# Patient Record
Sex: FEMALE | Race: WHITE | Employment: OTHER
[De-identification: names, ages, dates, MRNs, and addresses within clinical notes are randomized per-mention and may not be internally consistent; named-entity substitution may affect disease eponyms.]

---

## 2017-01-01 ENCOUNTER — HEALTH MAINTENANCE LETTER (OUTPATIENT)
Age: 57
End: 2017-01-01

## 2017-03-17 ENCOUNTER — TRANSFERRED RECORDS (OUTPATIENT)
Dept: HEALTH INFORMATION MANAGEMENT | Facility: CLINIC | Age: 57
End: 2017-03-17

## 2017-03-21 ENCOUNTER — TRANSFERRED RECORDS (OUTPATIENT)
Dept: HEALTH INFORMATION MANAGEMENT | Facility: CLINIC | Age: 57
End: 2017-03-21

## 2017-03-23 ENCOUNTER — TRANSFERRED RECORDS (OUTPATIENT)
Dept: HEALTH INFORMATION MANAGEMENT | Facility: CLINIC | Age: 57
End: 2017-03-23

## 2017-03-29 ENCOUNTER — TRANSFERRED RECORDS (OUTPATIENT)
Dept: HEALTH INFORMATION MANAGEMENT | Facility: CLINIC | Age: 57
End: 2017-03-29

## 2017-03-30 ENCOUNTER — TRANSFERRED RECORDS (OUTPATIENT)
Dept: HEALTH INFORMATION MANAGEMENT | Facility: CLINIC | Age: 57
End: 2017-03-30

## 2017-10-21 ENCOUNTER — HOSPITAL ENCOUNTER (EMERGENCY)
Facility: CLINIC | Age: 57
Discharge: HOME | End: 2017-10-21
Attending: EMERGENCY MEDICINE | Admitting: EMERGENCY MEDICINE
Payer: COMMERCIAL

## 2017-10-21 ENCOUNTER — APPOINTMENT (OUTPATIENT)
Dept: CT IMAGING | Facility: CLINIC | Age: 57
End: 2017-10-21
Attending: EMERGENCY MEDICINE
Payer: COMMERCIAL

## 2017-10-21 VITALS
HEIGHT: 62 IN | DIASTOLIC BLOOD PRESSURE: 89 MMHG | BODY MASS INDEX: 17.39 KG/M2 | SYSTOLIC BLOOD PRESSURE: 125 MMHG | HEART RATE: 70 BPM | RESPIRATION RATE: 20 BRPM | TEMPERATURE: 97.9 F | OXYGEN SATURATION: 95 % | WEIGHT: 94.5 LBS

## 2017-10-21 DIAGNOSIS — R04.2 HEMOPTYSIS: ICD-10-CM

## 2017-10-21 LAB
ALBUMIN SERPL-MCNC: 3.9 G/DL (ref 3.4–5)
ALP SERPL-CCNC: 96 U/L (ref 40–150)
ALT SERPL W P-5'-P-CCNC: 29 U/L (ref 0–50)
ANION GAP SERPL CALCULATED.3IONS-SCNC: 6 MMOL/L (ref 3–14)
AST SERPL W P-5'-P-CCNC: 19 U/L (ref 0–45)
BASOPHILS # BLD AUTO: 0 10E9/L (ref 0–0.2)
BASOPHILS NFR BLD AUTO: 0.1 %
BILIRUB SERPL-MCNC: 0.2 MG/DL (ref 0.2–1.3)
BUN SERPL-MCNC: 10 MG/DL (ref 7–30)
CALCIUM SERPL-MCNC: 9.4 MG/DL (ref 8.5–10.1)
CHLORIDE SERPL-SCNC: 105 MMOL/L (ref 94–109)
CO2 SERPL-SCNC: 28 MMOL/L (ref 20–32)
CREAT SERPL-MCNC: 0.71 MG/DL (ref 0.52–1.04)
DIFFERENTIAL METHOD BLD: ABNORMAL
EOSINOPHIL # BLD AUTO: 0 10E9/L (ref 0–0.7)
EOSINOPHIL NFR BLD AUTO: 0 %
ERYTHROCYTE [DISTWIDTH] IN BLOOD BY AUTOMATED COUNT: 12.1 % (ref 10–15)
GFR SERPL CREATININE-BSD FRML MDRD: 85 ML/MIN/1.7M2
GLUCOSE SERPL-MCNC: 90 MG/DL (ref 70–99)
HCT VFR BLD AUTO: 39.2 % (ref 35–47)
HGB BLD-MCNC: 13 G/DL (ref 11.7–15.7)
IMM GRANULOCYTES # BLD: 0 10E9/L (ref 0–0.4)
IMM GRANULOCYTES NFR BLD: 0.2 %
LACTATE SERPL-SCNC: 0.7 MMOL/L (ref 0.4–2)
LYMPHOCYTES # BLD AUTO: 2.5 10E9/L (ref 0.8–5.3)
LYMPHOCYTES NFR BLD AUTO: 31.3 %
MCH RBC QN AUTO: 31.6 PG (ref 26.5–33)
MCHC RBC AUTO-ENTMCNC: 33.2 G/DL (ref 31.5–36.5)
MCV RBC AUTO: 95 FL (ref 78–100)
MONOCYTES # BLD AUTO: 0.7 10E9/L (ref 0–1.3)
MONOCYTES NFR BLD AUTO: 8.6 %
NEUTROPHILS # BLD AUTO: 4.8 10E9/L (ref 1.6–8.3)
NEUTROPHILS NFR BLD AUTO: 59.8 %
NRBC # BLD AUTO: 0 10*3/UL
NRBC BLD AUTO-RTO: 0 /100
PLATELET # BLD AUTO: 455 10E9/L (ref 150–450)
POTASSIUM SERPL-SCNC: 3.9 MMOL/L (ref 3.4–5.3)
PROT SERPL-MCNC: 7.3 G/DL (ref 6.8–8.8)
RBC # BLD AUTO: 4.11 10E12/L (ref 3.8–5.2)
SODIUM SERPL-SCNC: 139 MMOL/L (ref 133–144)
TROPONIN I SERPL-MCNC: <0.015 UG/L (ref 0–0.04)
WBC # BLD AUTO: 8 10E9/L (ref 4–11)

## 2017-10-21 PROCEDURE — 96361 HYDRATE IV INFUSION ADD-ON: CPT

## 2017-10-21 PROCEDURE — 99285 EMERGENCY DEPT VISIT HI MDM: CPT | Mod: 25

## 2017-10-21 PROCEDURE — 71260 CT THORAX DX C+: CPT

## 2017-10-21 PROCEDURE — 83605 ASSAY OF LACTIC ACID: CPT | Performed by: EMERGENCY MEDICINE

## 2017-10-21 PROCEDURE — 80053 COMPREHEN METABOLIC PANEL: CPT | Performed by: EMERGENCY MEDICINE

## 2017-10-21 PROCEDURE — 85025 COMPLETE CBC W/AUTO DIFF WBC: CPT | Performed by: EMERGENCY MEDICINE

## 2017-10-21 PROCEDURE — 96360 HYDRATION IV INFUSION INIT: CPT | Mod: 59

## 2017-10-21 PROCEDURE — 84484 ASSAY OF TROPONIN QUANT: CPT | Performed by: EMERGENCY MEDICINE

## 2017-10-21 PROCEDURE — 25000128 H RX IP 250 OP 636: Performed by: EMERGENCY MEDICINE

## 2017-10-21 RX ORDER — IOPAMIDOL 755 MG/ML
500 INJECTION, SOLUTION INTRAVASCULAR ONCE
Status: COMPLETED | OUTPATIENT
Start: 2017-10-21 | End: 2017-10-21

## 2017-10-21 RX ADMIN — SODIUM CHLORIDE 66 ML: 9 INJECTION, SOLUTION INTRAVENOUS at 21:37

## 2017-10-21 RX ADMIN — IOPAMIDOL 47 ML: 755 INJECTION, SOLUTION INTRAVENOUS at 21:37

## 2017-10-21 RX ADMIN — SODIUM CHLORIDE 1000 ML: 9 INJECTION, SOLUTION INTRAVENOUS at 18:30

## 2017-10-21 ASSESSMENT — ENCOUNTER SYMPTOMS
FEVER: 0
HEMATURIA: 0
DYSURIA: 0
NAUSEA: 0
VOMITING: 0
SHORTNESS OF BREATH: 0
COUGH: 1
FREQUENCY: 0
ABDOMINAL PAIN: 0

## 2017-10-21 NOTE — ED NOTES
Patient complaining of coughing up blood today.  Is currently treating kidney cancer with mets to the lungs.  Oncology sent for eval.      ABCs intact.  Alert and oriented x 3.

## 2017-10-21 NOTE — ED AVS SNAPSHOT
[facility] Emergency Department    [address]    [facility] [address]    Phone:  [phone]    Fax:  [phone]                                       Meggan Law   MRN: [id number]    Department:  [facility] Emergency Department   Date of Visit:  10/21/2017           After Visit Summary Signature Page     I have received my discharge instructions, and my questions have been answered. I have discussed any challenges I see with this plan with the nurse or doctor.    ..........................................................................................................................................  Patient/Patient Representative Signature      ..........................................................................................................................................  Patient Representative Print Name and Relationship to Patient    ..................................................               ................................................  Date                                            Time    ..........................................................................................................................................  Reviewed by Signature/Title    ...................................................              ..............................................  Date                                                            Time

## 2017-10-21 NOTE — ED PROVIDER NOTES
History     Chief Complaint:  Bloody Cough     HPI   Meggan Law is a 57 year old female with a history of kidney cancer with metastasis to the lungs currently on Nivolumab chemotherapy who presents to the emergency department accompanied by her  for evaluation of a bloody cough. Recently, the patient has had a somewhat productive cough. This afternoon, the patient started to develop a cough with blood present in the sputum. She did take Robitussin with Codeine with some improvement of her cough. She then contacted her oncologist regarding this who referred her to seek evaluation in the ED. Otherwise, she has not had any fever, chest pain, shortness of breath, abdominal pain, nausea, vomiting, dysuria, hematuria, or urinary frequency in association with her current symptoms. Her oncologist is Dr. Quiroga of [facility].     Allergies:  Influenza vaccine     Medications:    Calcium carb-Cholecalciferol   Multivitamin   Vimpat  Lamotrigine  Nivolumab   Norvasc   Keppra     Past Medical History:    Primary malignant neoplasm of left kidney with metastasis from kidney to other site   Intractable partial epilepsy  Ganglion of joint  Mucous cyst of joint  Nontoxic multinodular goiter  Polyp of colon, adenomatous  Diverticulosis of large intestine  Internal hemorrhoids   Osteoporosis   Lumbago  Osteopenia     Past Surgical History:    Appendectomy  Tonsillectomy  Neotsu teeth extraction    Family History:    CAD - Father  CHF - Father   COPD - Mother and father   Osteoporosis - Mother  Schizophrenia - Sister     Social History:  Tobacco use:    Current some day smoker - 0.50 packs / day for 35 years   Alcohol use:    Positive - 1 drink / month  Marital status:       Accompanied to ED by:       Review of Systems   Constitutional: Negative for fever.   Respiratory: Positive for cough (hemoptysis ). Negative for shortness of breath.    Cardiovascular: Negative for chest pain.  "  Gastrointestinal: Negative for abdominal pain, nausea and vomiting.   Genitourinary: Negative for dysuria, frequency and hematuria.   All other systems reviewed and are negative.    Physical Exam   First Vitals:  BP: 134/81  Pulse: 82  Heart Rate: 70  Temp: 97.9  F (36.6  C)  Resp: 20  Height: 157.5 cm (5' 2\")  Weight: 42.9 kg (94 lb 8 oz)  SpO2: 96 %      Physical Exam  Nursing note and vitals reviewed.  Constitutional: Cooperative. No distress.  HENT:   Mouth/Throat: Moist mucous membranes.   Eyes: EOMI, nonicteric sclera  Cardiovascular: Normal rate, regular rhythm, no murmurs, rubs, or gallops  Pulmonary/Chest: Effort normal and breath sounds normal. No respiratory distress. No wheezes. No rales.   Abdominal: Soft. Nontender, nondistended, no guarding or rigidity. BS present.   Musculoskeletal: Normal range of motion.   Neurological: Alert. Moves all extremities spontaneously.   Skin: Skin is warm and dry. No rash noted.   Psychiatric: Normal mood and affect.       Emergency Department Course     Imaging:  Radiographic findings were communicated with the patient and family who voiced understanding of the findings.    Chest CT, IV Contrast Only - PE Protocol:  IMPRESSION:  1. No evidence of pulmonary embolus.  2. Progression of extensive bilateral pulmonary metastatic lesions.  Per radiology.     Laboratory:  CBC:  high, o/w WNL (WBC 8.0, HGB 13.0)  CMP: WNL (Creatinine 0.71)  Troponin I 2000: <0.015  Lactic acid: 0.7       Interventions:  1830 NS 1,000 mL IV     Emergency Department Course:  Nursing notes and vitals reviewed.  1917: I performed an exam of the patient as documented above.     2228: I spoke with Dr. Quiroga of the oncology service from [facility] regarding patient's presentation, findings, and plan of care.    2230: I updated and reassessed the patient.     I personally reviewed the laboratory results with the Patient and spouse and answered all related questions prior to " discharge.     Findings and plan explained to the Patient and spouse. Patient discharged home with instructions regarding supportive care, medications, and reasons to return. The importance of close follow-up was reviewed.     Impression & Plan      Medical Decision Making:  Meggan Law is a 57 year old female who presents with hemoptysis. The patient has a history of metastatic renal cell carcinoma to her lungs. The history is broad and includes mucosal sloughing secondary to frequent coughing, PE, metastatic infiltration into the bronchioles, pneumonia, among many other processes. CT chest is negative for acute etiology but does show worsening metastasis. This was compared with CT from March however. The patient notes that she did have a much more recent CT which did show similar progression. She states that is why she was on the new medication from her oncologist. I did actually speak with her oncologist, Dr. Quiroga, and discussed the patient's lab and imaging results. He states that often times after stating immunotherapy that there is an increase in inflammation and he is suspicious that her increase in inflammation is responsible for her hemoptysis. This is certainly not massive hemoptysis at this time as there is only about a dime-sized spot of blood on the Kleenexes that the patient brought with her. I did discuss the reasons to return to the emergency department including fever, chest pain, shortness of breath, massive hemoptysis, or for any other concerns. She is in stable condition at the time of discharge, indications for return to the ED were discussed as well as follow up. All questions were answered and she and  are in agreement with the plan.      Diagnosis:    ICD-10-CM   1. Hemoptysis R04.2       Disposition:  Discharged to home.       I, Parker Randall, am serving as a scribe at 7:17 PM on 10/21/2017 to document services personally performed by Dr. Devine, based on my  observations and the provider's statements to me.      [facility] EMERGENCY DEPARTMENT       Hal Devine MD  10/23/17 7729

## 2017-10-21 NOTE — ED AVS SNAPSHOT
[facility] Emergency Department    [address]    [facility] [address]    Phone:  [phone]    Fax:  [phone]                                       Meggan Law   MRN: [id number]    Department:  [facility] Emergency Department   Date of Visit:  10/21/2017           Patient Information     Date Of Birth          1960        Your diagnoses for this visit were:     Hemoptysis        You were seen by Hal Devine MD.      Follow-up Information     Call [facility].    Why:  touch base on Monday     Contact information:    [id number]  [phone]  [place] [phone]        Follow up with [facility] Emergency Department.    Specialty:  EMERGENCY MEDICINE    Why:  As needed, If symptoms worsen    Contact information:    [address]  [facility] [address]  [phone]        Discharge Instructions         Hemoptysis    Hemoptysis is the medical term for coughing up blood. There are many causes for this, including minor illnesses like bronchitis. Hemoptysis can also be an early sign of a more serious illness, like a blood clot in the lung (pulmonary embolism), cancer, tuberculosis, or pneumonia.  Less common causes of hemoptysis can be hard to diagnose in an emergency department or a clinic. More testing will be needed if the symptoms continue.  Home care    Stay away from cigarette smoke. Smoke irritates the bronchial passages.    Unless you are taking daily aspirin to prevent stroke or heart attack, do not take aspirin or products that contain aspirin. Aspirin affects how readily the blood clots. Medicines that prevent clotting may make hemoptysis worse.    If you have a lung infection, drinking extra fluid will help loosen secretions in the lungs.    Over-the-counter cough medicines that contain dextromethorphan may help reduce coughing. Check with a medical provider before taking  dextromethorphan if you have a chronic illness, are pregnant, or take daily medications.    If you were prescribed an antibiotic, take it until it is all gone. Take it even if you are feeling better after only a few days.  Follow-up care  Follow up with your health care provider, or as advised.  When to seek medical advice  Call your healthcare provider right away if any of these occur:    Fever of 100.4 F (38 C) or higher  Call 911, or get immediate medical care  Call emergency services right away if any of these occur:    Coughing up an increased amount of blood    Trouble breathing, wheezing, or pain with breathing    Chest pain or chest pressure    Fainting or losing consciousness    Rapid heartbeat    Weakness or dizziness  Date Last Reviewed: 9/13/2015 2000-2017 [employer]. [address] [place] [address]. All rights reserved. This information is not intended as a substitute for professional medical care. Always follow your healthcare professional's instructions.          24 Hour Appointment Hotline       To make an appointment at any [facility], call [phone] ([phone]). If you don't have a family doctor or clinic, we will help you find one. [facility] clinics are conveniently located to serve the needs of you and your family.             Review of your medicines      Notice     You have not been prescribed any medications.            Procedures and tests performed during your visit     CBC with platelets differential    Chest CT, IV contrast only - PE protocol    Comprehensive metabolic panel    Lactic acid    Peripheral IV catheter    Troponin I      Orders Needing Specimen Collection     None      Pending Results     No orders found from 10/19/2017 to 10/22/2017.            Pending Culture Results     No orders found from 10/19/2017 to 10/22/2017.            Pending Results Instructions     If you had any lab results that were not finalized at the time of your  Discharge, you can call the ED Lab Result RN at [phone]. You will be contacted by this team for any positive Lab results or changes in treatment. The nurses are available 7 days a week from 10A to 6:30P.  You can leave a message 24 hours per day and they will return your call.        Test Results From Your Hospital Stay        10/21/2017  8:30 PM      Component Results     Component Value Ref Range & Units Status    WBC 8.0 4.0 - 11.0 10e9/L Final    RBC Count 4.11 3.8 - 5.2 10e12/L Final    Hemoglobin 13.0 11.7 - 15.7 g/dL Final    Hematocrit 39.2 35.0 - 47.0 % Final    MCV 95 78 - 100 fl Final    MCH 31.6 26.5 - 33.0 pg Final    MCHC 33.2 31.5 - 36.5 g/dL Final    RDW 12.1 10.0 - 15.0 % Final    Platelet Count 455 (H) 150 - 450 10e9/L Final    Diff Method Automated Method  Final    % Neutrophils 59.8 % Final    % Lymphocytes 31.3 % Final    % Monocytes 8.6 % Final    % Eosinophils 0.0 % Final    % Basophils 0.1 % Final    % Immature Granulocytes 0.2 % Final    Nucleated RBCs 0 0 /100 Final    Absolute Neutrophil 4.8 1.6 - 8.3 10e9/L Final    Absolute Lymphocytes 2.5 0.8 - 5.3 10e9/L Final    Absolute Monocytes 0.7 0.0 - 1.3 10e9/L Final    Absolute Eosinophils 0.0 0.0 - 0.7 10e9/L Final    Absolute Basophils 0.0 0.0 - 0.2 10e9/L Final    Abs Immature Granulocytes 0.0 0 - 0.4 10e9/L Final    Absolute Nucleated RBC 0.0  Final         10/21/2017  8:52 PM      Component Results     Component Value Ref Range & Units Status    Sodium 139 133 - 144 mmol/L Final    Potassium 3.9 3.4 - 5.3 mmol/L Final    Chloride 105 94 - 109 mmol/L Final    Carbon Dioxide 28 20 - 32 mmol/L Final    Anion Gap 6 3 - 14 mmol/L Final    Glucose 90 70 - 99 mg/dL Final    Urea Nitrogen 10 7 - 30 mg/dL Final    Creatinine 0.71 0.52 - 1.04 mg/dL Final    GFR Estimate 85 >60 mL/min/1.7m2 Final    Non  GFR Calc    GFR Estimate If Black >90 >60 mL/min/1.7m2 Final    African American GFR Calc    Calcium 9.4 8.5 - 10.1 mg/dL  Final    Bilirubin Total 0.2 0.2 - 1.3 mg/dL Final    Albumin 3.9 3.4 - 5.0 g/dL Final    Protein Total 7.3 6.8 - 8.8 g/dL Final    Alkaline Phosphatase 96 40 - 150 U/L Final    ALT 29 0 - 50 U/L Final    AST 19 0 - 45 U/L Final         10/21/2017  8:52 PM      Component Results     Component Value Ref Range & Units Status    Troponin I ES <0.015 0.000 - 0.045 ug/L Final    The 99th percentile for upper reference range is 0.045 ug/L.  Troponin values   in the range of 0.045 - 0.120 ug/L may be associated with risks of adverse   clinical events.           10/21/2017 10:00 PM      Narrative     CT CHEST PULMONARY EMBOLISM WITH CONTRAST 10/21/2017 9:39 PM     HISTORY: Hemoptysis, metastatic RCC.    TECHNIQUE: 47 mL Isovue-370 IV.  Radiation dose for this scan was  reduced using automated exposure control, adjustment of the mA and/or  kV according to patient size, or iterative reconstruction technique.    COMPARISON: 3/17/2017 chest CT.    FINDINGS: No evidence of pulmonary embolus. No evidence of aortic  aneurysm or dissection. Extensive bilateral pulmonary metastatic  lesions appear more prominent than on the prior study. No significant  adenopathy. Scans that include a portion of the upper abdomen are  unremarkable.        Impression     IMPRESSION:  1. No evidence of pulmonary embolus.  2. Progression of extensive bilateral pulmonary metastatic lesions.    BRIE DAVIS MD         10/21/2017  8:41 PM      Component Results     Component Value Ref Range & Units Status    Lactic Acid 0.7 0.4 - 2.0 mmol/L Final                Clinical Quality Measure: Blood Pressure Screening     Your blood pressure was checked while you were in the emergency department today. The last reading we obtained was  BP: 143/77 . Please read the guidelines below about what these numbers mean and what you should do about them.  If your systolic blood pressure (the top number) is less than 120 and your diastolic blood pressure (the bottom  "number) is less than 80, then your blood pressure is normal. There is nothing more that you need to do about it.  If your systolic blood pressure (the top number) is 120-139 or your diastolic blood pressure (the bottom number) is 80-89, your blood pressure may be higher than it should be. You should have your blood pressure rechecked within a year by a primary care provider.  If your systolic blood pressure (the top number) is 140 or greater or your diastolic blood pressure (the bottom number) is 90 or greater, you may have high blood pressure. High blood pressure is treatable, but if left untreated over time it can put you at risk for heart attack, stroke, or kidney failure. You should have your blood pressure rechecked by a primary care provider within the next 4 weeks.  If your provider in the emergency department today gave you specific instructions to follow-up with your doctor or provider even sooner than that, you should follow that instruction and not wait for up to 4 weeks for your follow-up visit.        Thank you for choosing [facility]       Thank you for choosing [facility] for your care. Our goal is always to provide you with excellent care. Hearing back from our patients is one way we can continue to improve our services. Please take a few minutes to complete the written survey that you may receive in the mail after you visit with us. Thank you!        [employer] Information     [employer] lets you send messages to your doctor, view your test results, renew your prescriptions, schedule appointments and more. To sign up, go to [email or web address] . Click on \"Log in\" on the left side of the screen, which will take you to the Welcome page. Then click on \"Sign up Now\" on the right side of the page.     You will be asked to enter the access code listed below, as well as some personal information. Please follow the directions to create your username and password.     Your access code is: [email or web address]  Expires: " 2018 11:02 PM     Your access code will  in 90 days. If you need help or a new code, please call your [facility] clinic or [phone].        Care EveryWhere ID     This is your Care EveryWhere ID. This could be used by other organizations to access your [facility] medical records  [id number]        Equal Access to Services     PROSPER STEINER : Abel Murrieta, veronica shetty, myriam galarza, areli villatoro . So [facility] [phone].    ATENCIÓN: Si habla español, tiene a hinson disposición servicios gratuitos de asistencia lingüística. Llame al [phone].    We comply with applicable federal civil rights laws and [place] laws. We do not discriminate on the basis of race, color, national origin, age, disability, sex, sexual orientation, or gender identity.            After Visit Summary       This is your record. Keep this with you and show to your community pharmacist(s) and doctor(s) at your next visit.

## 2017-10-22 NOTE — DISCHARGE INSTRUCTIONS
Hemoptysis    Hemoptysis is the medical term for coughing up blood. There are many causes for this, including minor illnesses like bronchitis. Hemoptysis can also be an early sign of a more serious illness, like a blood clot in the lung (pulmonary embolism), cancer, tuberculosis, or pneumonia.  Less common causes of hemoptysis can be hard to diagnose in an emergency department or a clinic. More testing will be needed if the symptoms continue.  Home care    Stay away from cigarette smoke. Smoke irritates the bronchial passages.    Unless you are taking daily aspirin to prevent stroke or heart attack, do not take aspirin or products that contain aspirin. Aspirin affects how readily the blood clots. Medicines that prevent clotting may make hemoptysis worse.    If you have a lung infection, drinking extra fluid will help loosen secretions in the lungs.    Over-the-counter cough medicines that contain dextromethorphan may help reduce coughing. Check with a medical provider before taking dextromethorphan if you have a chronic illness, are pregnant, or take daily medications.    If you were prescribed an antibiotic, take it until it is all gone. Take it even if you are feeling better after only a few days.  Follow-up care  Follow up with your health care provider, or as advised.  When to seek medical advice  Call your healthcare provider right away if any of these occur:    Fever of 100.4 F (38 C) or higher  Call 911, or get immediate medical care  Call emergency services right away if any of these occur:    Coughing up an increased amount of blood    Trouble breathing, wheezing, or pain with breathing    Chest pain or chest pressure    Fainting or losing consciousness    Rapid heartbeat    Weakness or dizziness  Date Last Reviewed: 9/13/2015 2000-2017 [employer]. [address] [place] [address], [place] [address]. All rights reserved. This information is not intended as a substitute for professional medical  care. Always follow your healthcare professional's instructions.

## 2018-01-01 ENCOUNTER — HOSPITAL ENCOUNTER (OUTPATIENT)
Facility: CLINIC | Age: 58
End: 2018-01-01
Payer: COMMERCIAL

## 2018-01-01 ENCOUNTER — HOSPITAL ENCOUNTER (OUTPATIENT)
Dept: ULTRASOUND IMAGING | Facility: CLINIC | Age: 58
Discharge: HOME | End: 2018-09-17
Admitting: PHYSICIAN ASSISTANT
Payer: COMMERCIAL

## 2018-01-01 ENCOUNTER — APPOINTMENT (OUTPATIENT)
Dept: GENERAL RADIOLOGY | Facility: CLINIC | Age: 58
DRG: 180 | End: 2018-01-01
Attending: INTERNAL MEDICINE
Payer: COMMERCIAL

## 2018-01-01 ENCOUNTER — HOSPITAL ENCOUNTER (OUTPATIENT)
Dept: ULTRASOUND IMAGING | Facility: CLINIC | Age: 58
Discharge: HOME | End: 2018-10-22
Admitting: INTERNAL MEDICINE
Payer: COMMERCIAL

## 2018-01-01 ENCOUNTER — APPOINTMENT (OUTPATIENT)
Dept: ULTRASOUND IMAGING | Facility: CLINIC | Age: 58
DRG: 180 | End: 2018-01-01
Attending: EMERGENCY MEDICINE
Payer: COMMERCIAL

## 2018-01-01 ENCOUNTER — HOSPITAL ENCOUNTER (OUTPATIENT)
Dept: ULTRASOUND IMAGING | Facility: CLINIC | Age: 58
Discharge: HOME | End: 2018-08-27
Admitting: RADIOLOGY
Payer: COMMERCIAL

## 2018-01-01 ENCOUNTER — HOSPITAL ENCOUNTER (OUTPATIENT)
Dept: GENERAL RADIOLOGY | Facility: CLINIC | Age: 58
End: 2018-09-28
Attending: RADIOLOGY
Payer: COMMERCIAL

## 2018-01-01 ENCOUNTER — APPOINTMENT (OUTPATIENT)
Dept: CT IMAGING | Facility: CLINIC | Age: 58
End: 2018-01-01
Attending: EMERGENCY MEDICINE
Payer: COMMERCIAL

## 2018-01-01 ENCOUNTER — HOSPITAL ENCOUNTER (OUTPATIENT)
Dept: ULTRASOUND IMAGING | Facility: CLINIC | Age: 58
Discharge: HOME | End: 2018-09-13
Admitting: INTERNAL MEDICINE
Payer: COMMERCIAL

## 2018-01-01 ENCOUNTER — HOSPITAL ENCOUNTER (OUTPATIENT)
Dept: ULTRASOUND IMAGING | Facility: CLINIC | Age: 58
Discharge: HOME | End: 2018-10-05
Admitting: INTERNAL MEDICINE
Payer: COMMERCIAL

## 2018-01-01 ENCOUNTER — HOSPITAL ENCOUNTER (OUTPATIENT)
Facility: CLINIC | Age: 58
Discharge: HOME | End: 2018-08-17
Admitting: RADIOLOGY
Payer: COMMERCIAL

## 2018-01-01 ENCOUNTER — HOSPITAL ENCOUNTER (OUTPATIENT)
Dept: ULTRASOUND IMAGING | Facility: CLINIC | Age: 58
End: 2018-08-17
Admitting: COLON & RECTAL SURGERY
Payer: COMMERCIAL

## 2018-01-01 ENCOUNTER — APPOINTMENT (OUTPATIENT)
Dept: GENERAL RADIOLOGY | Facility: CLINIC | Age: 58
End: 2018-01-01
Attending: EMERGENCY MEDICINE
Payer: COMMERCIAL

## 2018-01-01 ENCOUNTER — APPOINTMENT (OUTPATIENT)
Dept: ULTRASOUND IMAGING | Facility: CLINIC | Age: 58
End: 2018-01-01
Attending: EMERGENCY MEDICINE
Payer: COMMERCIAL

## 2018-01-01 ENCOUNTER — HOSPITAL ENCOUNTER (OUTPATIENT)
Dept: ULTRASOUND IMAGING | Facility: CLINIC | Age: 58
Discharge: HOME | End: 2018-10-15
Admitting: PHYSICIAN ASSISTANT
Payer: COMMERCIAL

## 2018-01-01 ENCOUNTER — HOSPITAL ENCOUNTER (OUTPATIENT)
Dept: ULTRASOUND IMAGING | Facility: CLINIC | Age: 58
Discharge: HOME | End: 2018-09-21
Admitting: INTERNAL MEDICINE
Payer: COMMERCIAL

## 2018-01-01 ENCOUNTER — TRANSFERRED RECORDS (OUTPATIENT)
Dept: HEALTH INFORMATION MANAGEMENT | Facility: CLINIC | Age: 58
End: 2018-01-01

## 2018-01-01 ENCOUNTER — HOSPITAL ENCOUNTER (OUTPATIENT)
Dept: ULTRASOUND IMAGING | Facility: CLINIC | Age: 58
Discharge: HOME | End: 2018-09-28
Admitting: INTERNAL MEDICINE
Payer: COMMERCIAL

## 2018-01-01 ENCOUNTER — APPOINTMENT (OUTPATIENT)
Dept: GENERAL RADIOLOGY | Facility: CLINIC | Age: 58
DRG: 180 | End: 2018-01-01
Attending: RADIOLOGY
Payer: COMMERCIAL

## 2018-01-01 ENCOUNTER — APPOINTMENT (OUTPATIENT)
Dept: CT IMAGING | Facility: CLINIC | Age: 58
DRG: 180 | End: 2018-01-01
Attending: EMERGENCY MEDICINE
Payer: COMMERCIAL

## 2018-01-01 ENCOUNTER — HOSPITAL ENCOUNTER (EMERGENCY)
Facility: CLINIC | Age: 58
Discharge: HOME | End: 2018-03-12
Attending: EMERGENCY MEDICINE | Admitting: EMERGENCY MEDICINE
Payer: COMMERCIAL

## 2018-01-01 ENCOUNTER — HOSPITAL ENCOUNTER (OUTPATIENT)
Dept: ULTRASOUND IMAGING | Facility: CLINIC | Age: 58
Discharge: HOME | End: 2018-09-07
Admitting: RADIOLOGY
Payer: COMMERCIAL

## 2018-01-01 ENCOUNTER — HOSPITAL ENCOUNTER (OUTPATIENT)
Dept: ULTRASOUND IMAGING | Facility: CLINIC | Age: 58
Discharge: HOME | End: 2018-10-08
Admitting: INTERNAL MEDICINE
Payer: COMMERCIAL

## 2018-01-01 ENCOUNTER — HOSPITAL ENCOUNTER (OUTPATIENT)
Dept: ULTRASOUND IMAGING | Facility: CLINIC | Age: 58
Discharge: HOME | End: 2018-10-19
Admitting: INTERNAL MEDICINE
Payer: COMMERCIAL

## 2018-01-01 ENCOUNTER — HOSPITAL ENCOUNTER (EMERGENCY)
Facility: CLINIC | Age: 58
Discharge: HOME | End: 2018-08-24
Attending: EMERGENCY MEDICINE | Admitting: EMERGENCY MEDICINE
Payer: COMMERCIAL

## 2018-01-01 ENCOUNTER — APPOINTMENT (OUTPATIENT)
Dept: GENERAL RADIOLOGY | Facility: CLINIC | Age: 58
DRG: 180 | End: 2018-01-01
Attending: EMERGENCY MEDICINE
Payer: COMMERCIAL

## 2018-01-01 ENCOUNTER — HOSPITAL ENCOUNTER (OUTPATIENT)
Dept: GENERAL RADIOLOGY | Facility: CLINIC | Age: 58
End: 2018-09-25
Attending: RADIOLOGY
Payer: COMMERCIAL

## 2018-01-01 ENCOUNTER — HOSPITAL ENCOUNTER (OUTPATIENT)
Dept: ULTRASOUND IMAGING | Facility: CLINIC | Age: 58
Discharge: HOME | End: 2018-10-26
Admitting: INTERNAL MEDICINE
Payer: COMMERCIAL

## 2018-01-01 ENCOUNTER — HOSPITAL ENCOUNTER (OUTPATIENT)
Dept: GENERAL RADIOLOGY | Facility: CLINIC | Age: 58
End: 2018-08-27
Attending: RADIOLOGY
Payer: COMMERCIAL

## 2018-01-01 ENCOUNTER — APPOINTMENT (OUTPATIENT)
Dept: ULTRASOUND IMAGING | Facility: CLINIC | Age: 58
DRG: 180 | End: 2018-01-01
Attending: RADIOLOGY
Payer: COMMERCIAL

## 2018-01-01 ENCOUNTER — HOSPITAL ENCOUNTER (OUTPATIENT)
Dept: ULTRASOUND IMAGING | Facility: CLINIC | Age: 58
Discharge: HOME | End: 2018-10-02
Admitting: RADIOLOGY
Payer: COMMERCIAL

## 2018-01-01 ENCOUNTER — HOSPITAL ENCOUNTER (OUTPATIENT)
Dept: CT IMAGING | Facility: CLINIC | Age: 58
Discharge: HOME | End: 2018-09-11
Admitting: INTERNAL MEDICINE
Payer: COMMERCIAL

## 2018-01-01 ENCOUNTER — HOSPITAL ENCOUNTER (INPATIENT)
Facility: CLINIC | Age: 58
LOS: 3 days | Discharge: HOSPICE | DRG: 180 | End: 2018-11-01
Attending: EMERGENCY MEDICINE | Admitting: INTERNAL MEDICINE
Payer: COMMERCIAL

## 2018-01-01 ENCOUNTER — DOCUMENTATION ONLY (OUTPATIENT)
Dept: MEDSURG UNIT | Facility: CLINIC | Age: 58
End: 2018-01-01

## 2018-01-01 ENCOUNTER — HOSPITAL ENCOUNTER (OUTPATIENT)
Dept: ULTRASOUND IMAGING | Facility: CLINIC | Age: 58
Discharge: HOME | End: 2018-09-25
Admitting: INTERNAL MEDICINE
Payer: COMMERCIAL

## 2018-01-01 ENCOUNTER — HOSPITAL ENCOUNTER (OUTPATIENT)
Dept: ULTRASOUND IMAGING | Facility: CLINIC | Age: 58
Discharge: HOME | End: 2018-09-10
Admitting: PHYSICIAN ASSISTANT
Payer: COMMERCIAL

## 2018-01-01 ENCOUNTER — HOSPITAL ENCOUNTER (EMERGENCY)
Facility: CLINIC | Age: 58
Discharge: HOME | End: 2018-08-11
Attending: EMERGENCY MEDICINE | Admitting: EMERGENCY MEDICINE
Payer: COMMERCIAL

## 2018-01-01 ENCOUNTER — HOSPITAL ENCOUNTER (OUTPATIENT)
Facility: CLINIC | Age: 58
Setting detail: OBSERVATION
Discharge: HOME | End: 2018-07-13
Attending: EMERGENCY MEDICINE | Admitting: INTERNAL MEDICINE
Payer: COMMERCIAL

## 2018-01-01 ENCOUNTER — HOSPITAL ENCOUNTER (OUTPATIENT)
Dept: ULTRASOUND IMAGING | Facility: CLINIC | Age: 58
Discharge: HOME | End: 2018-10-12
Admitting: INTERNAL MEDICINE
Payer: COMMERCIAL

## 2018-01-01 ENCOUNTER — HOSPITAL ENCOUNTER (OUTPATIENT)
Dept: ULTRASOUND IMAGING | Facility: CLINIC | Age: 58
Discharge: HOME | End: 2018-09-04
Admitting: INTERNAL MEDICINE
Payer: COMMERCIAL

## 2018-01-01 VITALS
WEIGHT: 87.5 LBS | DIASTOLIC BLOOD PRESSURE: 66 MMHG | TEMPERATURE: 98.7 F | BODY MASS INDEX: 16.52 KG/M2 | HEIGHT: 61 IN | OXYGEN SATURATION: 91 % | RESPIRATION RATE: 20 BRPM | SYSTOLIC BLOOD PRESSURE: 114 MMHG | HEART RATE: 77 BPM

## 2018-01-01 VITALS — HEART RATE: 85 BPM | SYSTOLIC BLOOD PRESSURE: 107 MMHG | OXYGEN SATURATION: 94 % | DIASTOLIC BLOOD PRESSURE: 73 MMHG

## 2018-01-01 VITALS
TEMPERATURE: 100.5 F | OXYGEN SATURATION: 90 % | DIASTOLIC BLOOD PRESSURE: 53 MMHG | HEART RATE: 99 BPM | RESPIRATION RATE: 20 BRPM | SYSTOLIC BLOOD PRESSURE: 85 MMHG

## 2018-01-01 VITALS
RESPIRATION RATE: 20 BRPM | HEART RATE: 74 BPM | OXYGEN SATURATION: 98 % | DIASTOLIC BLOOD PRESSURE: 70 MMHG | SYSTOLIC BLOOD PRESSURE: 115 MMHG

## 2018-01-01 VITALS
OXYGEN SATURATION: 90 % | BODY MASS INDEX: 16.21 KG/M2 | DIASTOLIC BLOOD PRESSURE: 58 MMHG | WEIGHT: 88.1 LBS | RESPIRATION RATE: 28 BRPM | TEMPERATURE: 96 F | HEART RATE: 103 BPM | SYSTOLIC BLOOD PRESSURE: 91 MMHG | HEIGHT: 62 IN

## 2018-01-01 VITALS — DIASTOLIC BLOOD PRESSURE: 68 MMHG | SYSTOLIC BLOOD PRESSURE: 106 MMHG | HEART RATE: 79 BPM

## 2018-01-01 VITALS
DIASTOLIC BLOOD PRESSURE: 66 MMHG | OXYGEN SATURATION: 95 % | SYSTOLIC BLOOD PRESSURE: 139 MMHG | HEART RATE: 88 BPM | RESPIRATION RATE: 18 BRPM

## 2018-01-01 VITALS — OXYGEN SATURATION: 97 % | HEART RATE: 93 BPM | DIASTOLIC BLOOD PRESSURE: 77 MMHG | SYSTOLIC BLOOD PRESSURE: 113 MMHG

## 2018-01-01 VITALS
RESPIRATION RATE: 22 BRPM | SYSTOLIC BLOOD PRESSURE: 119 MMHG | HEART RATE: 74 BPM | OXYGEN SATURATION: 98 % | DIASTOLIC BLOOD PRESSURE: 76 MMHG

## 2018-01-01 VITALS
RESPIRATION RATE: 24 BRPM | BODY MASS INDEX: 17.02 KG/M2 | TEMPERATURE: 97.6 F | HEIGHT: 62 IN | OXYGEN SATURATION: 98 % | DIASTOLIC BLOOD PRESSURE: 79 MMHG | SYSTOLIC BLOOD PRESSURE: 135 MMHG | WEIGHT: 92.5 LBS

## 2018-01-01 VITALS — SYSTOLIC BLOOD PRESSURE: 134 MMHG | OXYGEN SATURATION: 97 % | DIASTOLIC BLOOD PRESSURE: 90 MMHG | HEART RATE: 100 BPM

## 2018-01-01 VITALS — OXYGEN SATURATION: 98 % | SYSTOLIC BLOOD PRESSURE: 120 MMHG | DIASTOLIC BLOOD PRESSURE: 87 MMHG | HEART RATE: 81 BPM

## 2018-01-01 VITALS
SYSTOLIC BLOOD PRESSURE: 123 MMHG | OXYGEN SATURATION: 93 % | HEART RATE: 99 BPM | RESPIRATION RATE: 18 BRPM | DIASTOLIC BLOOD PRESSURE: 79 MMHG

## 2018-01-01 VITALS — SYSTOLIC BLOOD PRESSURE: 122 MMHG | OXYGEN SATURATION: 98 % | DIASTOLIC BLOOD PRESSURE: 67 MMHG | HEART RATE: 76 BPM

## 2018-01-01 VITALS
OXYGEN SATURATION: 95 % | SYSTOLIC BLOOD PRESSURE: 116 MMHG | HEART RATE: 93 BPM | DIASTOLIC BLOOD PRESSURE: 84 MMHG | HEIGHT: 61 IN | TEMPERATURE: 98.5 F | BODY MASS INDEX: 16.62 KG/M2 | WEIGHT: 88 LBS | RESPIRATION RATE: 20 BRPM

## 2018-01-01 VITALS
OXYGEN SATURATION: 92 % | DIASTOLIC BLOOD PRESSURE: 97 MMHG | HEART RATE: 104 BPM | RESPIRATION RATE: 18 BRPM | SYSTOLIC BLOOD PRESSURE: 123 MMHG

## 2018-01-01 VITALS — OXYGEN SATURATION: 98 % | HEART RATE: 71 BPM

## 2018-01-01 VITALS — HEART RATE: 98 BPM | DIASTOLIC BLOOD PRESSURE: 65 MMHG | SYSTOLIC BLOOD PRESSURE: 106 MMHG

## 2018-01-01 VITALS — OXYGEN SATURATION: 93 % | TEMPERATURE: 98 F | SYSTOLIC BLOOD PRESSURE: 126 MMHG | DIASTOLIC BLOOD PRESSURE: 71 MMHG

## 2018-01-01 DIAGNOSIS — C64.9 RENAL CELL CARCINOMA, UNSPECIFIED LATERALITY (H): ICD-10-CM

## 2018-01-01 DIAGNOSIS — I95.89 OTHER SPECIFIED HYPOTENSION: ICD-10-CM

## 2018-01-01 DIAGNOSIS — J90 PLEURAL EFFUSION: ICD-10-CM

## 2018-01-01 DIAGNOSIS — R09.02 HYPOXIA: ICD-10-CM

## 2018-01-01 DIAGNOSIS — J96.01 ACUTE RESPIRATORY FAILURE WITH HYPOXIA (H): ICD-10-CM

## 2018-01-01 DIAGNOSIS — R06.00 DYSPNEA, UNSPECIFIED TYPE: ICD-10-CM

## 2018-01-01 DIAGNOSIS — Z85.528 HX OF RENAL CELL CARCINOMA: ICD-10-CM

## 2018-01-01 DIAGNOSIS — R09.02 HYPOXIA: Primary | ICD-10-CM

## 2018-01-01 DIAGNOSIS — C79.9 METASTATIC CANCER (H): ICD-10-CM

## 2018-01-01 DIAGNOSIS — R06.02 SOB (SHORTNESS OF BREATH): ICD-10-CM

## 2018-01-01 DIAGNOSIS — R19.7 DIARRHEA, UNSPECIFIED TYPE: ICD-10-CM

## 2018-01-01 DIAGNOSIS — E86.0 DEHYDRATION: ICD-10-CM

## 2018-01-01 DIAGNOSIS — Z51.5 END OF LIFE CARE: Primary | ICD-10-CM

## 2018-01-01 LAB
ALBUMIN SERPL-MCNC: 3.2 G/DL (ref 3.4–5)
ALBUMIN SERPL-MCNC: 3.7 G/DL (ref 3.4–5)
ALBUMIN UR-MCNC: NEGATIVE MG/DL
ALP SERPL-CCNC: 83 U/L (ref 40–150)
ALP SERPL-CCNC: 89 U/L (ref 40–150)
ALT SERPL W P-5'-P-CCNC: 24 U/L (ref 0–50)
ALT SERPL W P-5'-P-CCNC: 32 U/L (ref 0–50)
ALT SERPL-CCNC: 34 U/L (ref 8–45)
ANION GAP SERPL CALCULATED.3IONS-SCNC: 12 MMOL/L (ref 3–14)
ANION GAP SERPL CALCULATED.3IONS-SCNC: 4 MMOL/L (ref 3–14)
ANION GAP SERPL CALCULATED.3IONS-SCNC: 6 MMOL/L (ref 3–14)
ANION GAP SERPL CALCULATED.3IONS-SCNC: 7 MMOL/L (ref 3–14)
ANION GAP SERPL CALCULATED.3IONS-SCNC: 7 MMOL/L (ref 3–14)
APPEARANCE UR: CLEAR
AST SERPL W P-5'-P-CCNC: 22 U/L (ref 0–45)
AST SERPL W P-5'-P-CCNC: 22 U/L (ref 0–45)
AST SERPL-CCNC: 33 U/L (ref 2–40)
BACTERIA SPEC CULT: NO GROWTH
BASOPHILS # BLD AUTO: 0 10E9/L (ref 0–0.2)
BASOPHILS NFR BLD AUTO: 0.1 %
BASOPHILS NFR BLD AUTO: 0.2 %
BASOPHILS NFR BLD AUTO: 0.3 %
BASOPHILS NFR BLD AUTO: 0.4 %
BILIRUB SERPL-MCNC: 0.4 MG/DL (ref 0.2–1.3)
BILIRUB SERPL-MCNC: 0.7 MG/DL (ref 0.2–1.3)
BILIRUB UR QL STRIP: NEGATIVE
BUN SERPL-MCNC: 10 MG/DL (ref 7–30)
BUN SERPL-MCNC: 10 MG/DL (ref 7–30)
BUN SERPL-MCNC: 13 MG/DL (ref 7–30)
BUN SERPL-MCNC: 14 MG/DL (ref 7–30)
BUN SERPL-MCNC: 16 MG/DL (ref 7–30)
CALCIUM SERPL-MCNC: 8 MG/DL (ref 8.5–10.1)
CALCIUM SERPL-MCNC: 8.7 MG/DL (ref 8.5–10.1)
CALCIUM SERPL-MCNC: 8.8 MG/DL (ref 8.5–10.1)
CALCIUM SERPL-MCNC: 9 MG/DL (ref 8.5–10.1)
CALCIUM SERPL-MCNC: 9.1 MG/DL (ref 8.5–10.1)
CHLORIDE SERPL-SCNC: 100 MMOL/L (ref 94–109)
CHLORIDE SERPL-SCNC: 100 MMOL/L (ref 94–109)
CHLORIDE SERPL-SCNC: 101 MMOL/L (ref 94–109)
CHLORIDE SERPL-SCNC: 98 MMOL/L (ref 94–109)
CHLORIDE SERPL-SCNC: 98 MMOL/L (ref 94–109)
CO2 BLDCOV-SCNC: 29 MMOL/L (ref 21–28)
CO2 SERPL-SCNC: 24 MMOL/L (ref 20–32)
CO2 SERPL-SCNC: 28 MMOL/L (ref 20–32)
CO2 SERPL-SCNC: 28 MMOL/L (ref 20–32)
CO2 SERPL-SCNC: 31 MMOL/L (ref 20–32)
CO2 SERPL-SCNC: 31 MMOL/L (ref 20–32)
COLOR UR AUTO: ABNORMAL
CREAT SERPL-MCNC: 0.58 MG/DL (ref 0.52–1.04)
CREAT SERPL-MCNC: 0.62 MG/DL (ref 0.52–1.04)
CREAT SERPL-MCNC: 0.64 MG/DL (ref 0.52–1.04)
CREAT SERPL-MCNC: 0.65 MG/DL (ref 0.52–1.04)
CREAT SERPL-MCNC: 0.69 MG/DL (ref 0.57–1.11)
CREAT SERPL-MCNC: 0.84 MG/DL (ref 0.52–1.04)
CREAT SERPL-MCNC: 0.85 MG/DL (ref 0.52–1.04)
CREAT SERPL-MCNC: 1.37 MG/DL (ref 0.52–1.04)
DIFFERENTIAL METHOD BLD: ABNORMAL
DIFFERENTIAL METHOD BLD: NORMAL
EOSINOPHIL # BLD AUTO: 0 10E9/L (ref 0–0.7)
EOSINOPHIL # BLD AUTO: 0.1 10E9/L (ref 0–0.7)
EOSINOPHIL NFR BLD AUTO: 0 %
EOSINOPHIL NFR BLD AUTO: 0.8 %
EOSINOPHIL NFR BLD AUTO: 0.9 %
EOSINOPHIL NFR BLD AUTO: 1.3 %
ERYTHROCYTE [DISTWIDTH] IN BLOOD BY AUTOMATED COUNT: 12.8 % (ref 10–15)
ERYTHROCYTE [DISTWIDTH] IN BLOOD BY AUTOMATED COUNT: 13.8 % (ref 10–15)
ERYTHROCYTE [DISTWIDTH] IN BLOOD BY AUTOMATED COUNT: 13.9 % (ref 10–15)
ERYTHROCYTE [DISTWIDTH] IN BLOOD BY AUTOMATED COUNT: 14.6 % (ref 10–15)
ERYTHROCYTE [DISTWIDTH] IN BLOOD BY AUTOMATED COUNT: 14.8 % (ref 10–15)
GFR SERPL CREATININE-BSD FRML MDRD: 0 ML/MIN/1.7M2
GFR SERPL CREATININE-BSD FRML MDRD: 39 ML/MIN/1.7M2
GFR SERPL CREATININE-BSD FRML MDRD: 68 ML/MIN/1.7M2
GFR SERPL CREATININE-BSD FRML MDRD: 69 ML/MIN/1.7M2
GFR SERPL CREATININE-BSD FRML MDRD: >90 ML/MIN/1.7M2
GLUCOSE SERPL-MCNC: 100 MG/DL (ref 65–100)
GLUCOSE SERPL-MCNC: 103 MG/DL (ref 70–99)
GLUCOSE SERPL-MCNC: 105 MG/DL (ref 70–99)
GLUCOSE SERPL-MCNC: 109 MG/DL (ref 70–99)
GLUCOSE SERPL-MCNC: 85 MG/DL (ref 70–99)
GLUCOSE SERPL-MCNC: 88 MG/DL (ref 70–99)
GLUCOSE UR STRIP-MCNC: NEGATIVE MG/DL
HCT VFR BLD AUTO: 37.9 % (ref 35–47)
HCT VFR BLD AUTO: 42.9 % (ref 35–47)
HCT VFR BLD AUTO: 44.8 % (ref 35–47)
HCT VFR BLD AUTO: 45.8 % (ref 35–47)
HCT VFR BLD AUTO: 46.9 % (ref 35–47)
HGB BLD-MCNC: 12.6 G/DL (ref 11.7–15.7)
HGB BLD-MCNC: 14 G/DL (ref 11.7–15.7)
HGB BLD-MCNC: 14.5 G/DL (ref 11.7–15.7)
HGB BLD-MCNC: 15.2 G/DL (ref 11.7–15.7)
HGB BLD-MCNC: 15.8 G/DL (ref 11.7–15.7)
HGB UR QL STRIP: NEGATIVE
IMM GRANULOCYTES # BLD: 0 10E9/L (ref 0–0.4)
IMM GRANULOCYTES # BLD: 0 10E9/L (ref 0–0.4)
IMM GRANULOCYTES # BLD: 0.1 10E9/L (ref 0–0.4)
IMM GRANULOCYTES # BLD: 0.1 10E9/L (ref 0–0.4)
IMM GRANULOCYTES NFR BLD: 0.2 %
IMM GRANULOCYTES NFR BLD: 0.3 %
IMM GRANULOCYTES NFR BLD: 0.5 %
IMM GRANULOCYTES NFR BLD: 0.5 %
INR PPP: 0.86 (ref 0.86–1.14)
INR PPP: 0.94 (ref 0.86–1.14)
INR PPP: 0.96 (ref 0.86–1.14)
INTERPRETATION ECG - MUSE: NORMAL
KETONES UR STRIP-MCNC: NEGATIVE MG/DL
LACTATE BLD-SCNC: 1.3 MMOL/L (ref 0.7–2)
LACTATE BLD-SCNC: 1.4 MMOL/L (ref 0.7–2)
LACTATE BLD-SCNC: 1.5 MMOL/L (ref 0.7–2)
LACTATE BLD-SCNC: 1.7 MMOL/L (ref 0.7–2.1)
LACTATE BLD-SCNC: 2.3 MMOL/L (ref 0.7–2)
LEUKOCYTE ESTERASE UR QL STRIP: ABNORMAL
LIPASE SERPL-CCNC: 44 U/L (ref 73–393)
LYMPHOCYTES # BLD AUTO: 1.3 10E9/L (ref 0.8–5.3)
LYMPHOCYTES # BLD AUTO: 1.8 10E9/L (ref 0.8–5.3)
LYMPHOCYTES # BLD AUTO: 1.8 10E9/L (ref 0.8–5.3)
LYMPHOCYTES # BLD AUTO: 2 10E9/L (ref 0.8–5.3)
LYMPHOCYTES NFR BLD AUTO: 11.6 %
LYMPHOCYTES NFR BLD AUTO: 13.6 %
LYMPHOCYTES NFR BLD AUTO: 16.9 %
LYMPHOCYTES NFR BLD AUTO: 22.2 %
Lab: NORMAL
MCH RBC QN AUTO: 29.1 PG (ref 26.5–33)
MCH RBC QN AUTO: 31.5 PG (ref 26.5–33)
MCH RBC QN AUTO: 31.7 PG (ref 26.5–33)
MCH RBC QN AUTO: 31.7 PG (ref 26.5–33)
MCH RBC QN AUTO: 31.9 PG (ref 26.5–33)
MCHC RBC AUTO-ENTMCNC: 32.4 G/DL (ref 31.5–36.5)
MCHC RBC AUTO-ENTMCNC: 32.6 G/DL (ref 31.5–36.5)
MCHC RBC AUTO-ENTMCNC: 33.2 G/DL (ref 31.5–36.5)
MCHC RBC AUTO-ENTMCNC: 33.2 G/DL (ref 31.5–36.5)
MCHC RBC AUTO-ENTMCNC: 33.7 G/DL (ref 31.5–36.5)
MCV RBC AUTO: 88 FL (ref 78–100)
MCV RBC AUTO: 94 FL (ref 78–100)
MCV RBC AUTO: 95 FL (ref 78–100)
MCV RBC AUTO: 98 FL (ref 78–100)
MCV RBC AUTO: 98 FL (ref 78–100)
MONOCYTES # BLD AUTO: 0.6 10E9/L (ref 0–1.3)
MONOCYTES # BLD AUTO: 0.9 10E9/L (ref 0–1.3)
MONOCYTES # BLD AUTO: 0.9 10E9/L (ref 0–1.3)
MONOCYTES # BLD AUTO: 1.5 10E9/L (ref 0–1.3)
MONOCYTES NFR BLD AUTO: 11.9 %
MONOCYTES NFR BLD AUTO: 6.5 %
MONOCYTES NFR BLD AUTO: 8.4 %
MONOCYTES NFR BLD AUTO: 8.4 %
NEUTROPHILS # BLD AUTO: 6.3 10E9/L (ref 1.6–8.3)
NEUTROPHILS # BLD AUTO: 7.8 10E9/L (ref 1.6–8.3)
NEUTROPHILS # BLD AUTO: 8.7 10E9/L (ref 1.6–8.3)
NEUTROPHILS # BLD AUTO: 9.5 10E9/L (ref 1.6–8.3)
NEUTROPHILS NFR BLD AUTO: 69.4 %
NEUTROPHILS NFR BLD AUTO: 73.2 %
NEUTROPHILS NFR BLD AUTO: 73.9 %
NEUTROPHILS NFR BLD AUTO: 78.5 %
NITRATE UR QL: NEGATIVE
NRBC # BLD AUTO: 0 10*3/UL
NRBC BLD AUTO-RTO: 0 /100
PCO2 BLDV: 50 MM HG (ref 40–50)
PH BLDV: 7.38 PH (ref 7.32–7.43)
PH UR STRIP: 7 PH (ref 5–7)
PLATELET # BLD AUTO: 437 10E9/L (ref 150–450)
PLATELET # BLD AUTO: 482 10E9/L (ref 150–450)
PLATELET # BLD AUTO: 483 10E9/L (ref 150–450)
PLATELET # BLD AUTO: 493 10E9/L (ref 150–450)
PLATELET # BLD AUTO: 496 10E9/L (ref 150–450)
PLATELET # BLD AUTO: 501 10E9/L (ref 150–450)
PLATELET # BLD AUTO: 537 10E9/L (ref 150–450)
PO2 BLDV: 23 MM HG (ref 25–47)
POTASSIUM SERPL-SCNC: 3.8 MMOL/L (ref 3.4–5.3)
POTASSIUM SERPL-SCNC: 3.8 MMOL/L (ref 3.5–5)
POTASSIUM SERPL-SCNC: 4 MMOL/L (ref 3.4–5.3)
POTASSIUM SERPL-SCNC: 4.8 MMOL/L (ref 3.4–5.3)
POTASSIUM SERPL-SCNC: ABNORMAL MMOL/L (ref 3.4–5.3)
PROT SERPL-MCNC: 6.6 G/DL (ref 6.8–8.8)
PROT SERPL-MCNC: 7.4 G/DL (ref 6.8–8.8)
RBC # BLD AUTO: 4.33 10E12/L (ref 3.8–5.2)
RBC # BLD AUTO: 4.39 10E12/L (ref 3.8–5.2)
RBC # BLD AUTO: 4.57 10E12/L (ref 3.8–5.2)
RBC # BLD AUTO: 4.8 10E12/L (ref 3.8–5.2)
RBC # BLD AUTO: 5.01 10E12/L (ref 3.8–5.2)
RBC #/AREA URNS AUTO: 1 /HPF (ref 0–2)
SAO2 % BLDV FROM PO2: 38 %
SODIUM SERPL-SCNC: 132 MMOL/L (ref 133–144)
SODIUM SERPL-SCNC: 133 MMOL/L (ref 133–144)
SODIUM SERPL-SCNC: 136 MMOL/L (ref 133–144)
SODIUM SERPL-SCNC: 136 MMOL/L (ref 133–144)
SODIUM SERPL-SCNC: 138 MMOL/L (ref 133–144)
SOURCE: ABNORMAL
SP GR UR STRIP: 1 (ref 1–1.03)
SPECIMEN SOURCE: NORMAL
TROPONIN I SERPL-MCNC: <0.015 UG/L (ref 0–0.04)
TSH SERPL DL<=0.005 MIU/L-ACNC: 2.14 MU/L (ref 0.4–4)
TSH SERPL DL<=0.005 MIU/L-ACNC: 2.21 MU/L (ref 0.4–4)
UROBILINOGEN UR STRIP-MCNC: 0 MG/DL (ref 0–2)
WBC # BLD AUTO: 10.7 10E9/L (ref 4–11)
WBC # BLD AUTO: 11 10E9/L (ref 4–11)
WBC # BLD AUTO: 12.9 10E9/L (ref 4–11)
WBC # BLD AUTO: 13.4 10E9/L (ref 4–11)
WBC # BLD AUTO: 9 10E9/L (ref 4–11)
WBC #/AREA URNS AUTO: 12 /HPF (ref 0–5)

## 2018-01-01 PROCEDURE — 84484 ASSAY OF TROPONIN QUANT: CPT | Performed by: EMERGENCY MEDICINE

## 2018-01-01 PROCEDURE — 99231 SBSQ HOSP IP/OBS SF/LOW 25: CPT | Performed by: PHYSICIAN ASSISTANT

## 2018-01-01 PROCEDURE — 40000275 ZZH STATISTIC RCP TIME EA 10 MIN

## 2018-01-01 PROCEDURE — 99233 SBSQ HOSP IP/OBS HIGH 50: CPT | Performed by: INTERNAL MEDICINE

## 2018-01-01 PROCEDURE — 99217 ZZC OBSERVATION CARE DISCHARGE: CPT | Performed by: INTERNAL MEDICINE

## 2018-01-01 PROCEDURE — 25000132 ZZH RX MED GY IP 250 OP 250 PS 637: Performed by: NURSE PRACTITIONER

## 2018-01-01 PROCEDURE — 25000125 ZZHC RX 250: Performed by: INTERNAL MEDICINE

## 2018-01-01 PROCEDURE — 25000132 ZZH RX MED GY IP 250 OP 250 PS 637: Performed by: EMERGENCY MEDICINE

## 2018-01-01 PROCEDURE — 99239 HOSP IP/OBS DSCHRG MGMT >30: CPT | Mod: GW | Performed by: INTERNAL MEDICINE

## 2018-01-01 PROCEDURE — 96361 HYDRATE IV INFUSION ADD-ON: CPT

## 2018-01-01 PROCEDURE — 84443 ASSAY THYROID STIM HORMONE: CPT | Performed by: EMERGENCY MEDICINE

## 2018-01-01 PROCEDURE — 36415 COLL VENOUS BLD VENIPUNCTURE: CPT | Performed by: EMERGENCY MEDICINE

## 2018-01-01 PROCEDURE — 99285 EMERGENCY DEPT VISIT HI MDM: CPT | Mod: 25

## 2018-01-01 PROCEDURE — 85610 PROTHROMBIN TIME: CPT | Performed by: EMERGENCY MEDICINE

## 2018-01-01 PROCEDURE — 25000128 H RX IP 250 OP 636: Performed by: INTERNAL MEDICINE

## 2018-01-01 PROCEDURE — 25000128 H RX IP 250 OP 636: Performed by: EMERGENCY MEDICINE

## 2018-01-01 PROCEDURE — 71046 X-RAY EXAM CHEST 2 VIEWS: CPT

## 2018-01-01 PROCEDURE — 27210190 US THORACENTESIS

## 2018-01-01 PROCEDURE — 83690 ASSAY OF LIPASE: CPT | Performed by: EMERGENCY MEDICINE

## 2018-01-01 PROCEDURE — 25000125 ZZHC RX 250: Performed by: RADIOLOGY

## 2018-01-01 PROCEDURE — 32555 ASPIRATE PLEURA W/ IMAGING: CPT

## 2018-01-01 PROCEDURE — 81001 URINALYSIS AUTO W/SCOPE: CPT | Performed by: EMERGENCY MEDICINE

## 2018-01-01 PROCEDURE — 93005 ELECTROCARDIOGRAM TRACING: CPT

## 2018-01-01 PROCEDURE — 40000986 XR CHEST 1 VW

## 2018-01-01 PROCEDURE — 85610 PROTHROMBIN TIME: CPT | Performed by: RADIOLOGY

## 2018-01-01 PROCEDURE — 27210905 XR CHEST TUBE PLACEMENT NON-TUNNELED RIGHT

## 2018-01-01 PROCEDURE — 71260 CT THORAX DX C+: CPT

## 2018-01-01 PROCEDURE — 99207 ZZC MOONLIGHTING INDICATOR: CPT | Performed by: INTERNAL MEDICINE

## 2018-01-01 PROCEDURE — 83605 ASSAY OF LACTIC ACID: CPT | Performed by: INTERNAL MEDICINE

## 2018-01-01 PROCEDURE — 25000125 ZZHC RX 250: Performed by: PHYSICIAN ASSISTANT

## 2018-01-01 PROCEDURE — 82803 BLOOD GASES ANY COMBINATION: CPT

## 2018-01-01 PROCEDURE — 40000986 XR CHEST 2 VW

## 2018-01-01 PROCEDURE — 94660 CPAP INITIATION&MGMT: CPT

## 2018-01-01 PROCEDURE — 25000125 ZZHC RX 250: Performed by: EMERGENCY MEDICINE

## 2018-01-01 PROCEDURE — 85025 COMPLETE CBC W/AUTO DIFF WBC: CPT | Performed by: EMERGENCY MEDICINE

## 2018-01-01 PROCEDURE — 80053 COMPREHEN METABOLIC PANEL: CPT | Performed by: EMERGENCY MEDICINE

## 2018-01-01 PROCEDURE — 12000000 ZZH R&B MED SURG/OB

## 2018-01-01 PROCEDURE — 96372 THER/PROPH/DIAG INJ SC/IM: CPT

## 2018-01-01 PROCEDURE — 83605 ASSAY OF LACTIC ACID: CPT

## 2018-01-01 PROCEDURE — 36415 COLL VENOUS BLD VENIPUNCTURE: CPT | Performed by: INTERNAL MEDICINE

## 2018-01-01 PROCEDURE — 71045 X-RAY EXAM CHEST 1 VIEW: CPT

## 2018-01-01 PROCEDURE — 94640 AIRWAY INHALATION TREATMENT: CPT

## 2018-01-01 PROCEDURE — 96360 HYDRATION IV INFUSION INIT: CPT | Mod: 59

## 2018-01-01 PROCEDURE — 96374 THER/PROPH/DIAG INJ IV PUSH: CPT

## 2018-01-01 PROCEDURE — 99223 1ST HOSP IP/OBS HIGH 75: CPT | Mod: AI | Performed by: INTERNAL MEDICINE

## 2018-01-01 PROCEDURE — 25000132 ZZH RX MED GY IP 250 OP 250 PS 637: Performed by: INTERNAL MEDICINE

## 2018-01-01 PROCEDURE — 27210300 ZZH CANNULA HIGH FLOW, ADULT

## 2018-01-01 PROCEDURE — 99203 OFFICE O/P NEW LOW 30 MIN: CPT | Performed by: INTERNAL MEDICINE

## 2018-01-01 PROCEDURE — 83605 ASSAY OF LACTIC ACID: CPT | Performed by: THORACIC SURGERY (CARDIOTHORACIC VASCULAR SURGERY)

## 2018-01-01 PROCEDURE — 83605 ASSAY OF LACTIC ACID: CPT | Performed by: EMERGENCY MEDICINE

## 2018-01-01 PROCEDURE — 74177 CT ABD & PELVIS W/CONTRAST: CPT

## 2018-01-01 PROCEDURE — 96360 HYDRATION IV INFUSION INIT: CPT

## 2018-01-01 PROCEDURE — 99233 SBSQ HOSP IP/OBS HIGH 50: CPT | Performed by: CLINICAL NURSE SPECIALIST

## 2018-01-01 PROCEDURE — 40000863 ZZH STATISTIC RADIOLOGY XRAY, US, CT, MAR, NM

## 2018-01-01 PROCEDURE — 25000128 H RX IP 250 OP 636: Performed by: RADIOLOGY

## 2018-01-01 PROCEDURE — 0W993ZZ DRAINAGE OF RIGHT PLEURAL CAVITY, PERCUTANEOUS APPROACH: ICD-10-PCS | Performed by: RADIOLOGY

## 2018-01-01 PROCEDURE — 36415 COLL VENOUS BLD VENIPUNCTURE: CPT | Performed by: THORACIC SURGERY (CARDIOTHORACIC VASCULAR SURGERY)

## 2018-01-01 PROCEDURE — 84132 ASSAY OF SERUM POTASSIUM: CPT | Performed by: EMERGENCY MEDICINE

## 2018-01-01 PROCEDURE — 27210190 US CHEST TUBE INSERT

## 2018-01-01 PROCEDURE — 99214 OFFICE O/P EST MOD 30 MIN: CPT | Performed by: CLINICAL NURSE SPECIALIST

## 2018-01-01 PROCEDURE — 0W9930Z DRAINAGE OF RIGHT PLEURAL CAVITY WITH DRAINAGE DEVICE, PERCUTANEOUS APPROACH: ICD-10-PCS | Performed by: RADIOLOGY

## 2018-01-01 PROCEDURE — C1769 GUIDE WIRE: HCPCS

## 2018-01-01 PROCEDURE — G0378 HOSPITAL OBSERVATION PER HR: HCPCS

## 2018-01-01 PROCEDURE — 87040 BLOOD CULTURE FOR BACTERIA: CPT | Performed by: EMERGENCY MEDICINE

## 2018-01-01 PROCEDURE — 71046 X-RAY EXAM CHEST 2 VIEWS: CPT | Mod: XU

## 2018-01-01 PROCEDURE — 36415 COLL VENOUS BLD VENIPUNCTURE: CPT | Performed by: RADIOLOGY

## 2018-01-01 PROCEDURE — 94640 AIRWAY INHALATION TREATMENT: CPT | Mod: 76

## 2018-01-01 PROCEDURE — 25000131 ZZH RX MED GY IP 250 OP 636 PS 637: Performed by: CLINICAL NURSE SPECIALIST

## 2018-01-01 PROCEDURE — 82565 ASSAY OF CREATININE: CPT | Performed by: INTERNAL MEDICINE

## 2018-01-01 PROCEDURE — 99284 EMERGENCY DEPT VISIT MOD MDM: CPT | Mod: 25

## 2018-01-01 PROCEDURE — 80048 BASIC METABOLIC PNL TOTAL CA: CPT | Performed by: EMERGENCY MEDICINE

## 2018-01-01 PROCEDURE — 85049 AUTOMATED PLATELET COUNT: CPT | Performed by: INTERNAL MEDICINE

## 2018-01-01 PROCEDURE — 40000983 ZZH STATISTIC HFNC ADULT NON-CPAP

## 2018-01-01 PROCEDURE — 40000274 ZZH STATISTIC RCP CONSULT EA 30 MIN

## 2018-01-01 PROCEDURE — 80048 BASIC METABOLIC PNL TOTAL CA: CPT | Performed by: INTERNAL MEDICINE

## 2018-01-01 PROCEDURE — 99223 1ST HOSP IP/OBS HIGH 75: CPT | Performed by: NURSE PRACTITIONER

## 2018-01-01 PROCEDURE — 85027 COMPLETE CBC AUTOMATED: CPT | Performed by: INTERNAL MEDICINE

## 2018-01-01 PROCEDURE — 99207 ZZC CONSULT E&M CHANGED TO INITIAL LEVEL: CPT | Performed by: INTERNAL MEDICINE

## 2018-01-01 PROCEDURE — 99219 ZZC INITIAL OBSERVATION CARE,LEVL II: CPT | Performed by: INTERNAL MEDICINE

## 2018-01-01 PROCEDURE — 99207 ZZC CDG-CODE CATEGORY CHANGED: CPT | Performed by: INTERNAL MEDICINE

## 2018-01-01 PROCEDURE — 27210238 US THORACENTESIS

## 2018-01-01 PROCEDURE — 92511 NASOPHARYNGOSCOPY: CPT

## 2018-01-01 PROCEDURE — 87086 URINE CULTURE/COLONY COUNT: CPT | Performed by: EMERGENCY MEDICINE

## 2018-01-01 PROCEDURE — 99233 SBSQ HOSP IP/OBS HIGH 50: CPT | Performed by: NURSE PRACTITIONER

## 2018-01-01 RX ORDER — LIDOCAINE HYDROCHLORIDE 40 MG/ML
SOLUTION TOPICAL
Status: DISCONTINUED
Start: 2018-01-01 | End: 2018-01-01 | Stop reason: HOSPADM

## 2018-01-01 RX ORDER — MORPHINE SULFATE 10 MG/5ML
2.5 SOLUTION ORAL
Status: DISCONTINUED | OUTPATIENT
Start: 2018-01-01 | End: 2018-01-01 | Stop reason: HOSPADM

## 2018-01-01 RX ORDER — ELECTROLYTES/DEXTROSE
1 SOLUTION, ORAL ORAL DAILY
Status: ON HOLD | COMMUNITY
End: 2018-01-01

## 2018-01-01 RX ORDER — LACOSAMIDE 100 MG/1
100 TABLET ORAL ONCE
Status: COMPLETED | OUTPATIENT
Start: 2018-01-01 | End: 2018-01-01

## 2018-01-01 RX ORDER — BISACODYL 5 MG
15 TABLET, DELAYED RELEASE (ENTERIC COATED) ORAL DAILY PRN
Status: DISCONTINUED | OUTPATIENT
Start: 2018-01-01 | End: 2018-01-01 | Stop reason: HOSPADM

## 2018-01-01 RX ORDER — BENZONATATE 100 MG/1
100 CAPSULE ORAL 3 TIMES DAILY PRN
Status: DISCONTINUED | OUTPATIENT
Start: 2018-01-01 | End: 2018-01-01 | Stop reason: HOSPADM

## 2018-01-01 RX ORDER — LACOSAMIDE 100 MG/1
100 TABLET ORAL EVERY MORNING
Status: DISCONTINUED | OUTPATIENT
Start: 2018-01-01 | End: 2018-01-01 | Stop reason: HOSPADM

## 2018-01-01 RX ORDER — ONDANSETRON 4 MG/1
4 TABLET, ORALLY DISINTEGRATING ORAL EVERY 6 HOURS
Status: DISCONTINUED | OUTPATIENT
Start: 2018-01-01 | End: 2018-01-01 | Stop reason: HOSPADM

## 2018-01-01 RX ORDER — ONDANSETRON 4 MG/1
4 TABLET, ORALLY DISINTEGRATING ORAL EVERY 6 HOURS
Qty: 14 TABLET | Refills: 0 | Status: SHIPPED | OUTPATIENT
Start: 2018-01-01

## 2018-01-01 RX ORDER — PHENOL 1.4 %
1 AEROSOL, SPRAY (ML) MUCOUS MEMBRANE 2 TIMES DAILY
Status: ON HOLD | COMMUNITY
End: 2018-01-01

## 2018-01-01 RX ORDER — ZOLEDRONIC ACID 0.04 MG/ML
4 INJECTION, SOLUTION INTRAVENOUS ONCE
Status: ON HOLD | COMMUNITY
End: 2018-01-01

## 2018-01-01 RX ORDER — BISACODYL 5 MG
5 TABLET, DELAYED RELEASE (ENTERIC COATED) ORAL DAILY PRN
Status: DISCONTINUED | OUTPATIENT
Start: 2018-01-01 | End: 2018-01-01 | Stop reason: HOSPADM

## 2018-01-01 RX ORDER — HYDROCODONE BITARTRATE AND ACETAMINOPHEN 5; 325 MG/1; MG/1
1-2 TABLET ORAL EVERY 4 HOURS PRN
Status: ON HOLD | COMMUNITY
End: 2018-01-01

## 2018-01-01 RX ORDER — LORAZEPAM 0.5 MG/1
0.25 TABLET ORAL EVERY 6 HOURS PRN
Status: DISCONTINUED | OUTPATIENT
Start: 2018-01-01 | End: 2018-01-01

## 2018-01-01 RX ORDER — LACOSAMIDE 100 MG/1
100 TABLET ORAL EVERY MORNING
COMMUNITY

## 2018-01-01 RX ORDER — LAMOTRIGINE 100 MG/1
300 TABLET, EXTENDED RELEASE ORAL DAILY
Status: DISCONTINUED | OUTPATIENT
Start: 2018-01-01 | End: 2018-01-01 | Stop reason: HOSPADM

## 2018-01-01 RX ORDER — ACETAMINOPHEN 325 MG/1
650 TABLET ORAL EVERY 4 HOURS PRN
Status: DISCONTINUED | OUTPATIENT
Start: 2018-01-01 | End: 2018-01-01 | Stop reason: HOSPADM

## 2018-01-01 RX ORDER — PROCHLORPERAZINE 25 MG
25 SUPPOSITORY, RECTAL RECTAL EVERY 12 HOURS PRN
Status: CANCELLED | OUTPATIENT
Start: 2018-01-01

## 2018-01-01 RX ORDER — LACOSAMIDE 200 MG/1
200 TABLET ORAL EVERY EVENING
Status: DISCONTINUED | OUTPATIENT
Start: 2018-01-01 | End: 2018-01-01 | Stop reason: HOSPADM

## 2018-01-01 RX ORDER — LACOSAMIDE 50 MG/1
100 TABLET ORAL EVERY MORNING
Status: DISCONTINUED | OUTPATIENT
Start: 2018-01-01 | End: 2018-01-01 | Stop reason: HOSPADM

## 2018-01-01 RX ORDER — LEVETIRACETAM 1000 MG/1
1 TABLET ORAL 2 TIMES DAILY
COMMUNITY

## 2018-01-01 RX ORDER — LORAZEPAM 0.5 MG/1
0.5 TABLET ORAL EVERY 4 HOURS PRN
Status: DISCONTINUED | OUTPATIENT
Start: 2018-01-01 | End: 2018-01-01

## 2018-01-01 RX ORDER — MORPHINE SULFATE 10 MG/5ML
5-10 SOLUTION ORAL
Status: DISCONTINUED | OUTPATIENT
Start: 2018-01-01 | End: 2018-01-01

## 2018-01-01 RX ORDER — MORPHINE SULFATE 10 MG/5ML
5-10 SOLUTION ORAL
Status: DISCONTINUED | OUTPATIENT
Start: 2018-01-01 | End: 2018-01-01 | Stop reason: HOSPADM

## 2018-01-01 RX ORDER — MORPHINE SULFATE 4 MG/ML
1-2 INJECTION, SOLUTION INTRAMUSCULAR; INTRAVENOUS
Status: DISCONTINUED | OUTPATIENT
Start: 2018-01-01 | End: 2018-01-01 | Stop reason: HOSPADM

## 2018-01-01 RX ORDER — LAMOTRIGINE 300 MG/1
1 TABLET, EXTENDED RELEASE ORAL DAILY
COMMUNITY

## 2018-01-01 RX ORDER — HYDROMORPHONE HYDROCHLORIDE 1 MG/ML
2-4 SOLUTION ORAL
Status: DISCONTINUED | OUTPATIENT
Start: 2018-01-01 | End: 2018-01-01

## 2018-01-01 RX ORDER — HYDROMORPHONE HYDROCHLORIDE 1 MG/ML
2 SOLUTION ORAL EVERY 12 HOURS PRN
Status: DISCONTINUED | OUTPATIENT
Start: 2018-01-01 | End: 2018-01-01

## 2018-01-01 RX ORDER — IBUPROFEN 200 MG
400 TABLET ORAL ONCE
Status: COMPLETED | OUTPATIENT
Start: 2018-01-01 | End: 2018-01-01

## 2018-01-01 RX ORDER — IOPAMIDOL 755 MG/ML
500 INJECTION, SOLUTION INTRAVASCULAR ONCE
Status: COMPLETED | OUTPATIENT
Start: 2018-01-01 | End: 2018-01-01

## 2018-01-01 RX ORDER — MORPHINE SULFATE 100 MG/5ML
5-10 SOLUTION ORAL
Qty: 118 ML | Refills: 0 | Status: SHIPPED | OUTPATIENT
Start: 2018-01-01

## 2018-01-01 RX ORDER — HYDROCODONE BITARTRATE AND ACETAMINOPHEN 5; 325 MG/1; MG/1
1-2 TABLET ORAL EVERY 4 HOURS PRN
Status: DISCONTINUED | OUTPATIENT
Start: 2018-01-01 | End: 2018-01-01 | Stop reason: HOSPADM

## 2018-01-01 RX ORDER — DOCUSATE SODIUM 100 MG/1
100 CAPSULE, LIQUID FILLED ORAL 2 TIMES DAILY
Status: DISCONTINUED | OUTPATIENT
Start: 2018-01-01 | End: 2018-01-01

## 2018-01-01 RX ORDER — PROMETHAZINE HYDROCHLORIDE 25 MG/1
25 TABLET ORAL EVERY 6 HOURS PRN
Status: ON HOLD | COMMUNITY
End: 2018-01-01

## 2018-01-01 RX ORDER — OXYMETAZOLINE HYDROCHLORIDE 0.05 G/100ML
SPRAY NASAL
Status: DISCONTINUED
Start: 2018-01-01 | End: 2018-01-01 | Stop reason: HOSPADM

## 2018-01-01 RX ORDER — BISACODYL 10 MG
SUPPOSITORY, RECTAL RECTAL
Qty: 12 SUPPOSITORY | Refills: 1 | Status: SHIPPED | OUTPATIENT
Start: 2018-01-01

## 2018-01-01 RX ORDER — CODEINE PHOSPHATE AND GUAIFENESIN 10; 100 MG/5ML; MG/5ML
1 SOLUTION ORAL EVERY 4 HOURS PRN
COMMUNITY

## 2018-01-01 RX ORDER — LIDOCAINE 40 MG/G
CREAM TOPICAL
Status: DISCONTINUED | OUTPATIENT
Start: 2018-01-01 | End: 2018-01-01 | Stop reason: HOSPADM

## 2018-01-01 RX ORDER — ONDANSETRON 4 MG/1
4 TABLET, ORALLY DISINTEGRATING ORAL EVERY 6 HOURS PRN
Status: DISCONTINUED | OUTPATIENT
Start: 2018-01-01 | End: 2018-01-01

## 2018-01-01 RX ORDER — LIDOCAINE HYDROCHLORIDE 10 MG/ML
10 INJECTION, SOLUTION EPIDURAL; INFILTRATION; INTRACAUDAL; PERINEURAL ONCE
Status: COMPLETED | OUTPATIENT
Start: 2018-01-01 | End: 2018-01-01

## 2018-01-01 RX ORDER — LEVETIRACETAM 500 MG/1
1000 TABLET ORAL 2 TIMES DAILY
Status: DISCONTINUED | OUTPATIENT
Start: 2018-01-01 | End: 2018-01-01 | Stop reason: HOSPADM

## 2018-01-01 RX ORDER — ONDANSETRON 2 MG/ML
4 INJECTION INTRAMUSCULAR; INTRAVENOUS EVERY 6 HOURS PRN
Status: DISCONTINUED | OUTPATIENT
Start: 2018-01-01 | End: 2018-01-01 | Stop reason: HOSPADM

## 2018-01-01 RX ORDER — ALBUTEROL SULFATE 0.83 MG/ML
2.5 SOLUTION RESPIRATORY (INHALATION) EVERY 4 HOURS PRN
Status: DISCONTINUED | OUTPATIENT
Start: 2018-01-01 | End: 2018-01-01 | Stop reason: HOSPADM

## 2018-01-01 RX ORDER — BENZONATATE 100 MG/1
100 CAPSULE ORAL 3 TIMES DAILY
Status: DISCONTINUED | OUTPATIENT
Start: 2018-01-01 | End: 2018-01-01 | Stop reason: HOSPADM

## 2018-01-01 RX ORDER — HYDROMORPHONE HYDROCHLORIDE 1 MG/ML
2 SOLUTION ORAL
Status: DISCONTINUED | OUTPATIENT
Start: 2018-01-01 | End: 2018-01-01

## 2018-01-01 RX ORDER — NALOXONE HYDROCHLORIDE 0.4 MG/ML
.1-.4 INJECTION, SOLUTION INTRAMUSCULAR; INTRAVENOUS; SUBCUTANEOUS
Status: DISCONTINUED | OUTPATIENT
Start: 2018-01-01 | End: 2018-01-01 | Stop reason: HOSPADM

## 2018-01-01 RX ORDER — ATROPINE SULFATE 10 MG/ML
2-4 SOLUTION/ DROPS OPHTHALMIC
Qty: 5 ML | Refills: 0 | Status: SHIPPED | OUTPATIENT
Start: 2018-01-01

## 2018-01-01 RX ORDER — BISACODYL 10 MG
10 SUPPOSITORY, RECTAL RECTAL DAILY PRN
Status: DISCONTINUED | OUTPATIENT
Start: 2018-01-01 | End: 2018-01-01 | Stop reason: HOSPADM

## 2018-01-01 RX ORDER — CODEINE PHOSPHATE AND GUAIFENESIN 10; 100 MG/5ML; MG/5ML
5 SOLUTION ORAL EVERY 4 HOURS PRN
Status: DISCONTINUED | OUTPATIENT
Start: 2018-01-01 | End: 2018-01-01 | Stop reason: HOSPADM

## 2018-01-01 RX ORDER — IPRATROPIUM BROMIDE AND ALBUTEROL SULFATE 2.5; .5 MG/3ML; MG/3ML
3 SOLUTION RESPIRATORY (INHALATION) ONCE
Status: COMPLETED | OUTPATIENT
Start: 2018-01-01 | End: 2018-01-01

## 2018-01-01 RX ORDER — ONDANSETRON 2 MG/ML
4 INJECTION INTRAMUSCULAR; INTRAVENOUS EVERY 6 HOURS PRN
Status: DISCONTINUED | OUTPATIENT
Start: 2018-01-01 | End: 2018-01-01

## 2018-01-01 RX ORDER — LEVETIRACETAM 500 MG/1
1000 TABLET ORAL ONCE
Status: COMPLETED | OUTPATIENT
Start: 2018-01-01 | End: 2018-01-01

## 2018-01-01 RX ORDER — ACETAMINOPHEN 650 MG/1
650 SUPPOSITORY RECTAL EVERY 4 HOURS PRN
Status: DISCONTINUED | OUTPATIENT
Start: 2018-01-01 | End: 2018-01-01 | Stop reason: HOSPADM

## 2018-01-01 RX ORDER — MULTIPLE VITAMINS W/ MINERALS TAB 9MG-400MCG
1 TAB ORAL DAILY
Status: DISCONTINUED | OUTPATIENT
Start: 2018-01-01 | End: 2018-01-01 | Stop reason: HOSPADM

## 2018-01-01 RX ORDER — MORPHINE SULFATE 2 MG/ML
2 INJECTION, SOLUTION INTRAMUSCULAR; INTRAVENOUS
Status: DISCONTINUED | OUTPATIENT
Start: 2018-01-01 | End: 2018-01-01 | Stop reason: HOSPADM

## 2018-01-01 RX ORDER — LAMOTRIGINE 100 MG/1
300 TABLET, EXTENDED RELEASE ORAL ONCE
Status: COMPLETED | OUTPATIENT
Start: 2018-01-01 | End: 2018-01-01

## 2018-01-01 RX ORDER — FENTANYL CITRATE 50 UG/ML
50 INJECTION, SOLUTION INTRAMUSCULAR; INTRAVENOUS
Status: DISCONTINUED | OUTPATIENT
Start: 2018-01-01 | End: 2018-01-01 | Stop reason: ALTCHOICE

## 2018-01-01 RX ORDER — LIDOCAINE 40 MG/G
CREAM TOPICAL
Status: DISCONTINUED | OUTPATIENT
Start: 2018-01-01 | End: 2018-01-01

## 2018-01-01 RX ORDER — METHIMAZOLE 5 MG/1
5 TABLET ORAL EVERY EVENING
Status: DISCONTINUED | OUTPATIENT
Start: 2018-01-01 | End: 2018-01-01 | Stop reason: HOSPADM

## 2018-01-01 RX ORDER — LACOSAMIDE 200 MG/1
200 TABLET ORAL EVERY EVENING
COMMUNITY

## 2018-01-01 RX ORDER — ALBUTEROL SULFATE 5 MG/ML
2.5 SOLUTION RESPIRATORY (INHALATION) EVERY 4 HOURS PRN
Status: DISCONTINUED | OUTPATIENT
Start: 2018-01-01 | End: 2018-01-01

## 2018-01-01 RX ORDER — HYDROMORPHONE HYDROCHLORIDE 1 MG/ML
1 SOLUTION ORAL
Status: DISCONTINUED | OUTPATIENT
Start: 2018-01-01 | End: 2018-01-01

## 2018-01-01 RX ORDER — MORPHINE SULFATE 10 MG/5ML
2.5 SOLUTION ORAL EVERY 4 HOURS PRN
Status: ON HOLD | COMMUNITY
End: 2018-01-01

## 2018-01-01 RX ORDER — MORPHINE SULFATE 15 MG/1
15 TABLET, FILM COATED, EXTENDED RELEASE ORAL EVERY 12 HOURS
Status: DISCONTINUED | OUTPATIENT
Start: 2018-01-01 | End: 2018-01-01 | Stop reason: HOSPADM

## 2018-01-01 RX ORDER — HYDROCODONE BITARTRATE AND ACETAMINOPHEN 5; 325 MG/1; MG/1
2 TABLET ORAL EVERY 4 HOURS PRN
Status: DISCONTINUED | OUTPATIENT
Start: 2018-01-01 | End: 2018-01-01 | Stop reason: HOSPADM

## 2018-01-01 RX ORDER — LORAZEPAM 2 MG/ML
0.5 INJECTION INTRAMUSCULAR EVERY 6 HOURS PRN
Status: DISCONTINUED | OUTPATIENT
Start: 2018-01-01 | End: 2018-01-01 | Stop reason: HOSPADM

## 2018-01-01 RX ORDER — LIDOCAINE 4 G/G
1-2 PATCH TOPICAL EVERY 24 HOURS
Refills: 0
Start: 2018-01-01

## 2018-01-01 RX ORDER — BISACODYL 5 MG
10 TABLET, DELAYED RELEASE (ENTERIC COATED) ORAL DAILY PRN
Status: DISCONTINUED | OUTPATIENT
Start: 2018-01-01 | End: 2018-01-01 | Stop reason: HOSPADM

## 2018-01-01 RX ORDER — IPRATROPIUM BROMIDE AND ALBUTEROL SULFATE 2.5; .5 MG/3ML; MG/3ML
3 SOLUTION RESPIRATORY (INHALATION)
Status: DISCONTINUED | OUTPATIENT
Start: 2018-01-01 | End: 2018-01-01 | Stop reason: HOSPADM

## 2018-01-01 RX ORDER — SENNOSIDES 8.6 MG
1-2 TABLET ORAL 2 TIMES DAILY
Qty: 100 TABLET | Refills: 0 | Status: SHIPPED | OUTPATIENT
Start: 2018-01-01

## 2018-01-01 RX ORDER — METHIMAZOLE 5 MG/1
2.5 TABLET ORAL EVERY EVENING
Status: DISCONTINUED | OUTPATIENT
Start: 2018-01-01 | End: 2018-01-01 | Stop reason: HOSPADM

## 2018-01-01 RX ORDER — METHIMAZOLE 5 MG/1
5 TABLET ORAL EVERY EVENING
Status: ON HOLD | COMMUNITY
End: 2018-01-01

## 2018-01-01 RX ORDER — LAMOTRIGINE 300 MG/1
1 TABLET, EXTENDED RELEASE ORAL DAILY
Status: DISCONTINUED | OUTPATIENT
Start: 2018-01-01 | End: 2018-01-01 | Stop reason: CLARIF

## 2018-01-01 RX ORDER — ONDANSETRON 2 MG/ML
4 INJECTION INTRAMUSCULAR; INTRAVENOUS EVERY 6 HOURS
Status: DISCONTINUED | OUTPATIENT
Start: 2018-01-01 | End: 2018-01-01 | Stop reason: HOSPADM

## 2018-01-01 RX ORDER — DEXTROSE MONOHYDRATE 25 G/50ML
25-50 INJECTION, SOLUTION INTRAVENOUS
Status: DISCONTINUED | OUTPATIENT
Start: 2018-01-01 | End: 2018-01-01

## 2018-01-01 RX ORDER — METHIMAZOLE 5 MG/1
5 TABLET ORAL EVERY EVENING
Status: DISCONTINUED | OUTPATIENT
Start: 2018-01-01 | End: 2018-01-01 | Stop reason: CLARIF

## 2018-01-01 RX ORDER — ACETAMINOPHEN 325 MG/1
325-650 TABLET ORAL EVERY 4 HOURS PRN
Qty: 100 TABLET | Refills: 1 | Status: SHIPPED | OUTPATIENT
Start: 2018-01-01

## 2018-01-01 RX ORDER — NICOTINE POLACRILEX 4 MG
15-30 LOZENGE BUCCAL
Status: DISCONTINUED | OUTPATIENT
Start: 2018-01-01 | End: 2018-01-01

## 2018-01-01 RX ORDER — MORPHINE SULFATE 10 MG/5ML
10 SOLUTION ORAL
Status: DISCONTINUED | OUTPATIENT
Start: 2018-01-01 | End: 2018-01-01

## 2018-01-01 RX ORDER — NAPROXEN 500 MG/1
500 TABLET ORAL 2 TIMES DAILY WITH MEALS
Qty: 20 TABLET | Refills: 0 | Status: ON HOLD | OUTPATIENT
Start: 2018-01-01 | End: 2018-01-01

## 2018-01-01 RX ORDER — ALBUTEROL SULFATE 90 UG/1
2 AEROSOL, METERED RESPIRATORY (INHALATION) 4 TIMES DAILY
Qty: 1 INHALER | Refills: 1 | Status: SHIPPED | OUTPATIENT
Start: 2018-01-01 | End: 2018-01-01

## 2018-01-01 RX ORDER — MORPHINE SULFATE 10 MG/5ML
2.5 SOLUTION ORAL EVERY 4 HOURS PRN
Status: DISCONTINUED | OUTPATIENT
Start: 2018-01-01 | End: 2018-01-01

## 2018-01-01 RX ORDER — AMLODIPINE BESYLATE 2.5 MG/1
2.5 TABLET ORAL EVERY EVENING
Status: ON HOLD | COMMUNITY
End: 2018-01-01

## 2018-01-01 RX ORDER — CODEINE PHOSPHATE AND GUAIFENESIN 10; 100 MG/5ML; MG/5ML
5 SOLUTION ORAL AT BEDTIME
Status: DISCONTINUED | OUTPATIENT
Start: 2018-01-01 | End: 2018-01-01 | Stop reason: HOSPADM

## 2018-01-01 RX ORDER — ONDANSETRON 4 MG/1
4 TABLET, ORALLY DISINTEGRATING ORAL EVERY 6 HOURS PRN
Status: DISCONTINUED | OUTPATIENT
Start: 2018-01-01 | End: 2018-01-01 | Stop reason: HOSPADM

## 2018-01-01 RX ORDER — LORAZEPAM 2 MG/ML
.5-1 CONCENTRATE ORAL
Qty: 30 ML | Refills: 0 | Status: SHIPPED | OUTPATIENT
Start: 2018-01-01

## 2018-01-01 RX ORDER — MORPHINE SULFATE 4 MG/ML
2 INJECTION, SOLUTION INTRAMUSCULAR; INTRAVENOUS
Status: DISCONTINUED | OUTPATIENT
Start: 2018-01-01 | End: 2018-01-01 | Stop reason: HOSPADM

## 2018-01-01 RX ORDER — MORPHINE SULFATE 15 MG/1
15 TABLET, FILM COATED, EXTENDED RELEASE ORAL EVERY 12 HOURS
COMMUNITY

## 2018-01-01 RX ORDER — LIDOCAINE 4 G/G
1-2 PATCH TOPICAL
Status: DISCONTINUED | OUTPATIENT
Start: 2018-01-01 | End: 2018-01-01 | Stop reason: HOSPADM

## 2018-01-01 RX ORDER — LORAZEPAM 0.5 MG/1
0.5 TABLET ORAL
Status: DISCONTINUED | OUTPATIENT
Start: 2018-01-01 | End: 2018-01-01 | Stop reason: HOSPADM

## 2018-01-01 RX ORDER — METHIMAZOLE 5 MG/1
2.5 TABLET ORAL EVERY EVENING
Status: DISCONTINUED | OUTPATIENT
Start: 2018-01-01 | End: 2018-01-01

## 2018-01-01 RX ORDER — PROCHLORPERAZINE MALEATE 10 MG
10 TABLET ORAL EVERY 6 HOURS PRN
Status: CANCELLED | OUTPATIENT
Start: 2018-01-01

## 2018-01-01 RX ORDER — LEVETIRACETAM 1000 MG/1
1 TABLET ORAL 2 TIMES DAILY
Status: DISCONTINUED | OUTPATIENT
Start: 2018-01-01 | End: 2018-01-01 | Stop reason: CLARIF

## 2018-01-01 RX ORDER — LIDOCAINE HYDROCHLORIDE 10 MG/ML
5 INJECTION, SOLUTION EPIDURAL; INFILTRATION; INTRACAUDAL; PERINEURAL ONCE
Status: COMPLETED | OUTPATIENT
Start: 2018-01-01 | End: 2018-01-01

## 2018-01-01 RX ORDER — AMLODIPINE BESYLATE 2.5 MG/1
2.5 TABLET ORAL EVERY EVENING
Status: DISCONTINUED | OUTPATIENT
Start: 2018-01-01 | End: 2018-01-01

## 2018-01-01 RX ADMIN — LACOSAMIDE 100 MG: 100 TABLET, FILM COATED ORAL at 11:01

## 2018-01-01 RX ADMIN — MORPHINE SULFATE 2.5 MG: 10 SOLUTION ORAL at 19:37

## 2018-01-01 RX ADMIN — LEVETIRACETAM 1000 MG: 500 TABLET, FILM COATED ORAL at 20:03

## 2018-01-01 RX ADMIN — LEVETIRACETAM 1000 MG: 500 TABLET, FILM COATED ORAL at 11:01

## 2018-01-01 RX ADMIN — MORPHINE SULFATE 15 MG: 15 TABLET, EXTENDED RELEASE ORAL at 06:33

## 2018-01-01 RX ADMIN — HYDROMORPHONE HYDROCHLORIDE 2 MG: 1 SOLUTION ORAL at 20:32

## 2018-01-01 RX ADMIN — IOPAMIDOL 45 ML: 755 INJECTION, SOLUTION INTRAVENOUS at 10:42

## 2018-01-01 RX ADMIN — LIDOCAINE HYDROCHLORIDE 10 ML: 10 INJECTION, SOLUTION EPIDURAL; INFILTRATION; INTRACAUDAL; PERINEURAL at 14:14

## 2018-01-01 RX ADMIN — LACOSAMIDE 200 MG: 200 TABLET, FILM COATED ORAL at 20:33

## 2018-01-01 RX ADMIN — IOPAMIDOL 46 ML: 755 INJECTION, SOLUTION INTRAVENOUS at 09:12

## 2018-01-01 RX ADMIN — LAMOTRIGINE 300 MG: 100 TABLET, EXTENDED RELEASE ORAL at 17:45

## 2018-01-01 RX ADMIN — METHIMAZOLE 5 MG: 5 TABLET ORAL at 20:03

## 2018-01-01 RX ADMIN — HYDROCODONE BITARTRATE AND ACETAMINOPHEN 1 TABLET: 5; 325 TABLET ORAL at 20:03

## 2018-01-01 RX ADMIN — SODIUM CHLORIDE 66 ML: 900 INJECTION, SOLUTION INTRAVENOUS at 12:21

## 2018-01-01 RX ADMIN — IPRATROPIUM BROMIDE AND ALBUTEROL SULFATE 3 ML: .5; 3 SOLUTION RESPIRATORY (INHALATION) at 08:52

## 2018-01-01 RX ADMIN — IPRATROPIUM BROMIDE AND ALBUTEROL SULFATE 3 ML: .5; 3 SOLUTION RESPIRATORY (INHALATION) at 15:36

## 2018-01-01 RX ADMIN — MORPHINE SULFATE 15 MG: 15 TABLET, EXTENDED RELEASE ORAL at 05:33

## 2018-01-01 RX ADMIN — LIDOCAINE HYDROCHLORIDE 10 ML: 10 INJECTION, SOLUTION EPIDURAL; INFILTRATION; INTRACAUDAL; PERINEURAL at 10:45

## 2018-01-01 RX ADMIN — MORPHINE SULFATE 15 MG: 15 TABLET, EXTENDED RELEASE ORAL at 17:38

## 2018-01-01 RX ADMIN — SODIUM CHLORIDE, POTASSIUM CHLORIDE, SODIUM LACTATE AND CALCIUM CHLORIDE 1000 ML: 600; 310; 30; 20 INJECTION, SOLUTION INTRAVENOUS at 00:03

## 2018-01-01 RX ADMIN — HYDROMORPHONE HYDROCHLORIDE 2 MG: 1 SOLUTION ORAL at 09:53

## 2018-01-01 RX ADMIN — GUAIFENESIN AND CODEINE PHOSPHATE 5 ML: 10; 100 LIQUID ORAL at 22:19

## 2018-01-01 RX ADMIN — MORPHINE SULFATE 15 MG: 15 TABLET, EXTENDED RELEASE ORAL at 18:11

## 2018-01-01 RX ADMIN — IPRATROPIUM BROMIDE AND ALBUTEROL SULFATE 3 ML: .5; 3 SOLUTION RESPIRATORY (INHALATION) at 20:10

## 2018-01-01 RX ADMIN — LACOSAMIDE 100 MG: 100 TABLET, FILM COATED ORAL at 09:08

## 2018-01-01 RX ADMIN — LIDOCAINE HYDROCHLORIDE 10 ML: 10 INJECTION, SOLUTION EPIDURAL; INFILTRATION; INTRACAUDAL; PERINEURAL at 10:35

## 2018-01-01 RX ADMIN — ENOXAPARIN SODIUM 40 MG: 40 INJECTION SUBCUTANEOUS at 09:09

## 2018-01-01 RX ADMIN — SODIUM CHLORIDE 65 ML: 9 INJECTION, SOLUTION INTRAVENOUS at 09:12

## 2018-01-01 RX ADMIN — LAMOTRIGINE 300 MG: 100 TABLET, EXTENDED RELEASE ORAL at 11:00

## 2018-01-01 RX ADMIN — IPRATROPIUM BROMIDE AND ALBUTEROL SULFATE 3 ML: .5; 3 SOLUTION RESPIRATORY (INHALATION) at 16:10

## 2018-01-01 RX ADMIN — LEVETIRACETAM 1000 MG: 500 TABLET, FILM COATED ORAL at 09:08

## 2018-01-01 RX ADMIN — LACOSAMIDE 100 MG: 100 TABLET, FILM COATED ORAL at 09:49

## 2018-01-01 RX ADMIN — IOPAMIDOL 47 ML: 755 INJECTION, SOLUTION INTRAVENOUS at 12:21

## 2018-01-01 RX ADMIN — IBUPROFEN 400 MG: 200 TABLET, FILM COATED ORAL at 00:10

## 2018-01-01 RX ADMIN — HYDROMORPHONE HYDROCHLORIDE 1 MG: 1 SOLUTION ORAL at 08:32

## 2018-01-01 RX ADMIN — LORAZEPAM 0.5 MG: 2 INJECTION INTRAMUSCULAR; INTRAVENOUS at 06:54

## 2018-01-01 RX ADMIN — LEVETIRACETAM 1000 MG: 500 TABLET, FILM COATED ORAL at 09:49

## 2018-01-01 RX ADMIN — LIDOCAINE HYDROCHLORIDE 10 ML: 10 INJECTION, SOLUTION EPIDURAL; INFILTRATION; INTRACAUDAL; PERINEURAL at 10:46

## 2018-01-01 RX ADMIN — METHIMAZOLE 5 MG: 5 TABLET ORAL at 21:35

## 2018-01-01 RX ADMIN — MULTIPLE VITAMINS W/ MINERALS TAB 1 TABLET: TAB at 08:58

## 2018-01-01 RX ADMIN — SODIUM CHLORIDE 52 ML: 9 INJECTION, SOLUTION INTRAVENOUS at 00:52

## 2018-01-01 RX ADMIN — Medication 0.25 MG: at 05:29

## 2018-01-01 RX ADMIN — MULTIPLE VITAMINS W/ MINERALS TAB 1 TABLET: TAB at 17:48

## 2018-01-01 RX ADMIN — METHIMAZOLE 2.5 MG: 5 TABLET ORAL at 21:41

## 2018-01-01 RX ADMIN — LAMOTRIGINE 300 MG: 100 TABLET, EXTENDED RELEASE ORAL at 08:58

## 2018-01-01 RX ADMIN — HYDROMORPHONE HYDROCHLORIDE 4 MG: 1 SOLUTION ORAL at 03:39

## 2018-01-01 RX ADMIN — BENZONATATE 100 MG: 100 CAPSULE ORAL at 17:47

## 2018-01-01 RX ADMIN — ONDANSETRON 4 MG: 4 TABLET, ORALLY DISINTEGRATING ORAL at 09:49

## 2018-01-01 RX ADMIN — LEVETIRACETAM 1000 MG: 500 TABLET, FILM COATED ORAL at 20:31

## 2018-01-01 RX ADMIN — DOCUSATE SODIUM 100 MG: 100 CAPSULE, LIQUID FILLED ORAL at 20:22

## 2018-01-01 RX ADMIN — BENZONATATE 100 MG: 100 CAPSULE ORAL at 20:22

## 2018-01-01 RX ADMIN — LIDOCAINE HYDROCHLORIDE 10 ML: 10 INJECTION, SOLUTION EPIDURAL; INFILTRATION; INTRACAUDAL; PERINEURAL at 15:17

## 2018-01-01 RX ADMIN — IPRATROPIUM BROMIDE AND ALBUTEROL SULFATE 3 ML: .5; 3 SOLUTION RESPIRATORY (INHALATION) at 19:28

## 2018-01-01 RX ADMIN — HYDROMORPHONE HYDROCHLORIDE 2 MG: 1 SOLUTION ORAL at 18:18

## 2018-01-01 RX ADMIN — HYDROMORPHONE HYDROCHLORIDE 2 MG: 1 SOLUTION ORAL at 12:24

## 2018-01-01 RX ADMIN — SODIUM CHLORIDE 250 ML: 9 INJECTION, SOLUTION INTRAVENOUS at 16:10

## 2018-01-01 RX ADMIN — LACOSAMIDE 100 MG: 100 TABLET, FILM COATED ORAL at 11:24

## 2018-01-01 RX ADMIN — BENZONATATE 100 MG: 100 CAPSULE ORAL at 09:08

## 2018-01-01 RX ADMIN — ONDANSETRON 4 MG: 2 INJECTION INTRAMUSCULAR; INTRAVENOUS at 12:37

## 2018-01-01 RX ADMIN — LACOSAMIDE 200 MG: 200 TABLET, FILM COATED ORAL at 21:36

## 2018-01-01 RX ADMIN — IOPAMIDOL 48 ML: 755 INJECTION, SOLUTION INTRAVENOUS at 00:52

## 2018-01-01 RX ADMIN — AMLODIPINE BESYLATE 2.5 MG: 2.5 TABLET ORAL at 20:03

## 2018-01-01 RX ADMIN — LEVETIRACETAM 1000 MG: 500 TABLET ORAL at 08:59

## 2018-01-01 RX ADMIN — GUAIFENESIN AND CODEINE PHOSPHATE 5 ML: 100; 10 SOLUTION ORAL at 21:41

## 2018-01-01 RX ADMIN — LIDOCAINE HYDROCHLORIDE 10 ML: 10 INJECTION, SOLUTION EPIDURAL; INFILTRATION; INTRACAUDAL; PERINEURAL at 09:45

## 2018-01-01 RX ADMIN — MORPHINE SULFATE 15 MG: 15 TABLET, EXTENDED RELEASE ORAL at 17:56

## 2018-01-01 RX ADMIN — LAMOTRIGINE 300 MG: 100 TABLET, EXTENDED RELEASE ORAL at 09:49

## 2018-01-01 RX ADMIN — LIDOCAINE HYDROCHLORIDE 10 ML: 10 INJECTION, SOLUTION EPIDURAL; INFILTRATION; INTRACAUDAL; PERINEURAL at 11:59

## 2018-01-01 RX ADMIN — MORPHINE SULFATE 15 MG: 15 TABLET, EXTENDED RELEASE ORAL at 20:22

## 2018-01-01 RX ADMIN — MORPHINE SULFATE 2.5 MG: 10 SOLUTION ORAL at 21:34

## 2018-01-01 RX ADMIN — FENTANYL CITRATE 50 MCG: 50 INJECTION, SOLUTION INTRAMUSCULAR; INTRAVENOUS at 11:10

## 2018-01-01 RX ADMIN — IPRATROPIUM BROMIDE AND ALBUTEROL SULFATE 3 ML: .5; 3 SOLUTION RESPIRATORY (INHALATION) at 07:13

## 2018-01-01 RX ADMIN — LAMOTRIGINE 300 MG: 100 TABLET, EXTENDED RELEASE ORAL at 09:08

## 2018-01-01 RX ADMIN — SODIUM CHLORIDE 51 ML: 900 INJECTION, SOLUTION INTRAVENOUS at 10:42

## 2018-01-01 RX ADMIN — LORAZEPAM 0.5 MG: 0.5 TABLET ORAL at 09:59

## 2018-01-01 RX ADMIN — MORPHINE SULFATE 15 MG: 15 TABLET, EXTENDED RELEASE ORAL at 08:59

## 2018-01-01 RX ADMIN — LEVETIRACETAM 1000 MG: 500 TABLET, FILM COATED ORAL at 21:36

## 2018-01-01 RX ADMIN — IPRATROPIUM BROMIDE AND ALBUTEROL SULFATE 3 ML: .5; 3 SOLUTION RESPIRATORY (INHALATION) at 12:07

## 2018-01-01 RX ADMIN — LEVETIRACETAM 1000 MG: 500 TABLET, FILM COATED ORAL at 11:15

## 2018-01-01 RX ADMIN — MORPHINE SULFATE 10 MG: 10 SOLUTION ORAL at 08:42

## 2018-01-01 RX ADMIN — IPRATROPIUM BROMIDE AND ALBUTEROL SULFATE 3 ML: .5; 3 SOLUTION RESPIRATORY (INHALATION) at 11:15

## 2018-01-01 RX ADMIN — ONDANSETRON 4 MG: 2 INJECTION INTRAMUSCULAR; INTRAVENOUS at 03:52

## 2018-01-01 RX ADMIN — LIDOCAINE HYDROCHLORIDE 5 ML: 10 INJECTION, SOLUTION INFILTRATION; PERINEURAL at 14:20

## 2018-01-01 RX ADMIN — LIDOCAINE HYDROCHLORIDE 10 ML: 10 INJECTION, SOLUTION EPIDURAL; INFILTRATION; INTRACAUDAL; PERINEURAL at 13:44

## 2018-01-01 RX ADMIN — IPRATROPIUM BROMIDE AND ALBUTEROL SULFATE 3 ML: .5; 3 SOLUTION RESPIRATORY (INHALATION) at 07:28

## 2018-01-01 RX ADMIN — BENZONATATE 100 MG: 100 CAPSULE ORAL at 15:06

## 2018-01-01 RX ADMIN — METHIMAZOLE 5 MG: 5 TABLET ORAL at 20:30

## 2018-01-01 RX ADMIN — BENZONATATE 100 MG: 100 CAPSULE ORAL at 08:58

## 2018-01-01 RX ADMIN — LIDOCAINE HYDROCHLORIDE 10 ML: 10 INJECTION, SOLUTION EPIDURAL; INFILTRATION; INTRACAUDAL; PERINEURAL at 11:54

## 2018-01-01 RX ADMIN — LIDOCAINE HYDROCHLORIDE 10 ML: 10 INJECTION, SOLUTION EPIDURAL; INFILTRATION; INTRACAUDAL; PERINEURAL at 13:11

## 2018-01-01 RX ADMIN — LIDOCAINE HYDROCHLORIDE 10 ML: 10 INJECTION, SOLUTION EPIDURAL; INFILTRATION; INTRACAUDAL; PERINEURAL at 11:06

## 2018-01-01 RX ADMIN — LORAZEPAM 0.5 MG: 2 INJECTION INTRAMUSCULAR; INTRAVENOUS at 23:19

## 2018-01-01 RX ADMIN — IPRATROPIUM BROMIDE AND ALBUTEROL SULFATE 3 ML: .5; 3 SOLUTION RESPIRATORY (INHALATION) at 15:58

## 2018-01-01 RX ADMIN — LACOSAMIDE 100 MG: 50 TABLET, FILM COATED ORAL at 08:59

## 2018-01-01 RX ADMIN — LACOSAMIDE 200 MG: 200 TABLET, FILM COATED ORAL at 20:03

## 2018-01-01 RX ADMIN — MORPHINE SULFATE 15 MG: 15 TABLET, EXTENDED RELEASE ORAL at 05:23

## 2018-01-01 RX ADMIN — LIDOCAINE HYDROCHLORIDE 7 ML: 10 INJECTION, SOLUTION EPIDURAL; INFILTRATION; INTRACAUDAL; PERINEURAL at 17:11

## 2018-01-01 RX ADMIN — LEVETIRACETAM 1000 MG: 500 TABLET ORAL at 20:21

## 2018-01-01 RX ADMIN — DOCUSATE SODIUM 100 MG: 100 CAPSULE, LIQUID FILLED ORAL at 08:59

## 2018-01-01 RX ADMIN — HYDROMORPHONE HYDROCHLORIDE 4 MG: 1 SOLUTION ORAL at 22:08

## 2018-01-01 RX ADMIN — LAMOTRIGINE 300 MG: 100 TABLET, EXTENDED RELEASE ORAL at 11:24

## 2018-01-01 RX ADMIN — IPRATROPIUM BROMIDE AND ALBUTEROL SULFATE 3 ML: .5; 3 SOLUTION RESPIRATORY (INHALATION) at 15:35

## 2018-01-01 RX ADMIN — SODIUM CHLORIDE, POTASSIUM CHLORIDE, SODIUM LACTATE AND CALCIUM CHLORIDE 1000 ML: 600; 310; 30; 20 INJECTION, SOLUTION INTRAVENOUS at 01:39

## 2018-01-01 RX ADMIN — LORAZEPAM 0.5 MG: 0.5 TABLET ORAL at 09:53

## 2018-01-01 RX ADMIN — LACOSAMIDE 200 MG: 200 TABLET, FILM COATED ORAL at 20:31

## 2018-01-01 RX ADMIN — LIDOCAINE HYDROCHLORIDE 10 ML: 10 INJECTION, SOLUTION EPIDURAL; INFILTRATION; INTRACAUDAL; PERINEURAL at 11:10

## 2018-01-01 ASSESSMENT — ACTIVITIES OF DAILY LIVING (ADL)
COGNITION: 0 - NO COGNITION ISSUES REPORTED
ADLS_ACUITY_SCORE: 11
RETIRED_COMMUNICATION: 0-->UNDERSTANDS/COMMUNICATES WITHOUT DIFFICULTY
ADLS_ACUITY_SCORE: 11
RETIRED_EATING: 0-->INDEPENDENT
ADLS_ACUITY_SCORE: 11
ADLS_ACUITY_SCORE: 12
ADLS_ACUITY_SCORE: 13
FALL_HISTORY_WITHIN_LAST_SIX_MONTHS: NO
ADLS_ACUITY_SCORE: 12
ADLS_ACUITY_SCORE: 9
ADLS_ACUITY_SCORE: 13
BATHING: 0-->INDEPENDENT
SWALLOWING: 0-->SWALLOWS FOODS/LIQUIDS WITHOUT DIFFICULTY
ADLS_ACUITY_SCORE: 9
ADLS_ACUITY_SCORE: 12
ADLS_ACUITY_SCORE: 12
DRESS: 0-->INDEPENDENT
ADLS_ACUITY_SCORE: 11
TRANSFERRING: 0-->INDEPENDENT
ADLS_ACUITY_SCORE: 11
ADLS_ACUITY_SCORE: 9
AMBULATION: 0-->INDEPENDENT
TOILETING: 0-->INDEPENDENT
ADLS_ACUITY_SCORE: 11

## 2018-01-01 ASSESSMENT — ENCOUNTER SYMPTOMS
COUGH: 1
VOMITING: 1
ABDOMINAL PAIN: 1
FEVER: 0
BLOOD IN STOOL: 0
FEVER: 0
DYSURIA: 1
COUGH: 1
COUGH: 1
SORE THROAT: 1
FREQUENCY: 0
VOICE CHANGE: 1
ABDOMINAL PAIN: 0
CHEST TIGHTNESS: 1
NAUSEA: 0
VOMITING: 0
FEVER: 0
TROUBLE SWALLOWING: 0
DIZZINESS: 1
DIARRHEA: 1
FEVER: 0
LIGHT-HEADEDNESS: 1
SHORTNESS OF BREATH: 1
FEVER: 1
APPETITE CHANGE: 0
LIGHT-HEADEDNESS: 1
SHORTNESS OF BREATH: 1

## 2018-01-01 ASSESSMENT — PAIN DESCRIPTION - DESCRIPTORS
DESCRIPTORS: CRAMPING
DESCRIPTORS: ACHING
DESCRIPTORS: CRAMPING

## 2018-03-11 NOTE — ED AVS SNAPSHOT
[facility] Emergency Department    [address]    [facility] [address]    Phone:  [phone]    Fax:  [phone]                                       Meggan Law   MRN: [id number]    Department:  [facility] Emergency Department   Date of Visit:  3/11/2018           After Visit Summary Signature Page     I have received my discharge instructions, and my questions have been answered. I have discussed any challenges I see with this plan with the nurse or doctor.    ..........................................................................................................................................  Patient/Patient Representative Signature      ..........................................................................................................................................  Patient Representative Print Name and Relationship to Patient    ..................................................               ................................................  Date                                            Time    ..........................................................................................................................................  Reviewed by Signature/Title    ...................................................              ..............................................  Date                                                            Time

## 2018-03-11 NOTE — Clinical Note
Admitting Physician: KELL DELGADO [[id number]]   [id number]   Bed Type: Observation Unit [54]   Special needs: Fall Risk [8]   Special needs: Cancer [7]   Bed request comments: OBS UNIT ADMIT / BED REQUEST AT 0228

## 2018-03-11 NOTE — ED AVS SNAPSHOT
[facility] Emergency Department    [address] [place] [address]    Phone:  [phone]    Fax:  [phone]                                       Meggan Law   MRN: [id number]    Department:  [facility] Emergency Department   Date of Visit:  3/11/2018           Patient Information     Date Of Birth          1960        Your diagnoses for this visit were:     Diarrhea, unspecified type     Dehydration     Other specified hypotension        You were seen by Selin Becerril MD.      24 Hour Appointment Hotline       To make an appointment at any [facility] clinic, call [phone] ([phone]). If you don't have a family doctor or clinic, we will help you find one. [facility] clinics are conveniently located to serve the needs of you and your family.          ED Discharge Orders     Activity       Your activity upon discharge: activity as tolerated            Diet       Follow this diet upon discharge: regular            Reason for your hospital stay       You presented with low blood pressure related to diarrhea.  The diarrhea may be a side effect of your new chemotherapy medication.  Another possibility is a viral illness.  You can use Imodium to treat these symptoms.  Contact your oncologist within the next 24 hours to make them aware of your symptoms, and continue to drink plenty of fluids to avoid recurrent dehydration    Your oxygen levels were also low normal on evaluation.  A chest CT scan was performed showing multiple pulmonary nodules related to your cancer history.  No other cause for low oxygen levels were found.  Recommend to see your primary MD in the next 2-3 days to have this rechecked in the clinic.  Recommend you stop smoking                     Review of your medicines      Notice     You have not been prescribed any medications.            Procedures and tests performed during your visit     Procedure/Test Number of Times Performed     Blood culture 2    CBC with platelets differential 1    CT Chest/Abdomen/Pelvis w Contrast 1    Cardiac Continuous Monitoring 1    Comprehensive metabolic panel 1    I have reviewed and agree with all the recommendations and orders detailed in this document. 1    Lactic acid whole blood 1    Lipase 1    UA reflex to Microscopic and Culture 1    Urine Culture Aerobic Bacterial 1      Orders Needing Specimen Collection     None      Pending Results     Date and Time Order Name Status Description    3/12/2018 0125 Urine Culture Aerobic Bacterial In process     3/12/2018 0003 Blood culture Preliminary     3/11/2018 2326 Blood culture In process             Pending Culture Results     Date and Time Order Name Status Description    3/12/2018 0125 Urine Culture Aerobic Bacterial In process     3/12/2018 0003 Blood culture Preliminary     3/11/2018 2326 Blood culture In process             Pending Results Instructions     If you had any lab results that were not finalized at the time of your Discharge, you can call the ED Lab Result RN at [phone]. You will be contacted by this team for any positive Lab results or changes in treatment. The nurses are available 7 days a week from 10A to 6:30P.  You can leave a message 24 hours per day and they will return your call.        Test Results From Your Hospital Stay        3/12/2018 12:23 AM      Component Results     Component Value Ref Range & Units Status    WBC 12.9 (H) 4.0 - 11.0 10e9/L Final    RBC Count 4.33 3.8 - 5.2 10e12/L Final    Hemoglobin 12.6 11.7 - 15.7 g/dL Final    Hematocrit 37.9 35.0 - 47.0 % Final    MCV 88 78 - 100 fl Final    MCH 29.1 26.5 - 33.0 pg Final    MCHC 33.2 31.5 - 36.5 g/dL Final    RDW 14.8 10.0 - 15.0 % Final    Platelet Count 482 (H) 150 - 450 10e9/L Final    Diff Method Automated Method  Final    % Neutrophils 73.9 % Final    % Lymphocytes 13.6 % Final    % Monocytes 11.9 % Final    % Eosinophils 0.0 % Final    % Basophils 0.1 % Final     % Immature Granulocytes 0.5 % Final    Nucleated RBCs 0 0 /100 Final    Absolute Neutrophil 9.5 (H) 1.6 - 8.3 10e9/L Final    Absolute Lymphocytes 1.8 0.8 - 5.3 10e9/L Final    Absolute Monocytes 1.5 (H) 0.0 - 1.3 10e9/L Final    Absolute Eosinophils 0.0 0.0 - 0.7 10e9/L Final    Absolute Basophils 0.0 0.0 - 0.2 10e9/L Final    Abs Immature Granulocytes 0.1 0 - 0.4 10e9/L Final    Absolute Nucleated RBC 0.0  Final         3/12/2018 12:43 AM      Component Results     Component Value Ref Range & Units Status    Sodium 132 (L) 133 - 144 mmol/L Final    Potassium 3.8 3.4 - 5.3 mmol/L Final    Chloride 98 94 - 109 mmol/L Final    Carbon Dioxide 28 20 - 32 mmol/L Final    Anion Gap 6 3 - 14 mmol/L Final    Glucose 88 70 - 99 mg/dL Final    Urea Nitrogen 16 7 - 30 mg/dL Final    Creatinine 0.64 0.52 - 1.04 mg/dL Final    GFR Estimate >90 >60 mL/min/1.7m2 Final    Non  GFR Calc    GFR Estimate If Black >90 >60 mL/min/1.7m2 Final    African American GFR Calc    Calcium 8.7 8.5 - 10.1 mg/dL Final    Bilirubin Total 0.4 0.2 - 1.3 mg/dL Final    Albumin 3.2 (L) 3.4 - 5.0 g/dL Final    Protein Total 6.6 (L) 6.8 - 8.8 g/dL Final    Alkaline Phosphatase 83 40 - 150 U/L Final    ALT 24 0 - 50 U/L Final    AST 22 0 - 45 U/L Final         3/12/2018 12:43 AM      Component Results     Component Value Ref Range & Units Status    Lipase 44 (L) 73 - 393 U/L Final         3/12/2018  3:31 AM      Narrative     CT CHEST/ABDOMEN/PELVIS W CONTRAST 3/12/2018 1:07 AM     HISTORY: Increased hemoptysis; new abdominal pain, nausea, vomiting,  diarrhea;  history of renal cell carcinoma with metastasis to the  lungs.    TECHNIQUE: Volumetric acquisition through chest, abdomen and pelvis  with IV contrast.   58mL Isovue-370  Radiation dose for this scan was  reduced using automated exposure control, adjustment of the mA and/or  kV according to patient size, or iterative reconstruction technique.    COMPARISON:  10/21/2017.    FINDINGS:   Chest: Emphysema. Numerous bilateral lung nodules and masses which  have increased since the prior study. For example, one of the larger  masses in the left lower lung posteromedially (series 2, image 40)  measures 3.1 x 4.1 cm (previously 1.3 x 2.1 cm). Stable nodule arising  from the right lower thyroid. Normal heart size. No pleural effusions.    Abdomen and pelvis: Complex heterogeneous-enhancing mass arising from  the lower left kidney measures 4.1 x 5 cm consistent with renal cell  carcinoma. 1.6 cm left adrenal nodule is indeterminate. Upper  abdominal organs otherwise demonstrate no worrisome findings.    The left colon appears slightly thick-walled and ill-defined  suggesting colitis. No bowel obstruction. No suspicious pelvic masses.  Mild vascular calcification. No ascites, fluid collections or free  air. Bone windows demonstrate degenerative changes in the lumbar  spine. Small 1 cm nonspecific sclerotic focus in the proximal right  femur. There are several healing rib fractures bilaterally.        Impression     IMPRESSION:  1. Extensive pulmonary metastatic disease moderately increased from  the prior study.  2. Mild left-sided colitis.  3. Enhancing left renal mass consistent with known renal cell  carcinoma.  4. Small left adrenal nodule.    WILFRIDO MÉNDEZ MD         3/12/2018  3:13 AM         3/12/2018  1:52 AM      Component Results     Component Value Ref Range & Units Status    Color Urine Straw  Final    Appearance Urine Clear  Final    Glucose Urine Negative NEG^Negative mg/dL Final    Bilirubin Urine Negative NEG^Negative Final    Ketones Urine Negative NEG^Negative mg/dL Final    Specific Gravity Urine 1.005 1.003 - 1.035 Final    Blood Urine Negative NEG^Negative Final    pH Urine 7.0 5.0 - 7.0 pH Final    Protein Albumin Urine Negative NEG^Negative mg/dL Final    Urobilinogen mg/dL 0.0 0.0 - 2.0 mg/dL Final    Nitrite Urine Negative NEG^Negative Final     Leukocyte Esterase Urine Moderate (A) NEG^Negative Final    Source Unspecified Urine  Final    RBC Urine 1 0 - 2 /HPF Final    WBC Urine 12 (H) 0 - 5 /HPF Final         3/12/2018  3:13 AM      Component Results     Component    Specimen Description    Blood Right Arm    Special Requests    Aerobic and anaerobic bottles received    Culture Micro    PENDING         3/12/2018  1:45 AM      Component Results     Component Value Ref Range & Units Status    Lactic Acid 1.3 0.7 - 2.0 mmol/L Final         3/12/2018  1:53 AM                Clinical Quality Measure: Blood Pressure Screening     Your blood pressure was checked while you were in the emergency department today. The last reading we obtained was  BP: 102/61 . Please read the guidelines below about what these numbers mean and what you should do about them.  If your systolic blood pressure (the top number) is less than 120 and your diastolic blood pressure (the bottom number) is less than 80, then your blood pressure is normal. There is nothing more that you need to do about it.  If your systolic blood pressure (the top number) is 120-139 or your diastolic blood pressure (the bottom number) is 80-89, your blood pressure may be higher than it should be. You should have your blood pressure rechecked within a year by a primary care provider.  If your systolic blood pressure (the top number) is 140 or greater or your diastolic blood pressure (the bottom number) is 90 or greater, you may have high blood pressure. High blood pressure is treatable, but if left untreated over time it can put you at risk for heart attack, stroke, or kidney failure. You should have your blood pressure rechecked by a primary care provider within the next 4 weeks.  If your provider in the emergency department today gave you specific instructions to follow-up with your doctor or provider even sooner than that, you should follow that instruction and not wait for up to 4 weeks for your follow-up  visit.        Thank you for choosing [facility]       Thank you for choosing [facility] for your care. Our goal is always to provide you with excellent care. Hearing back from our patients is one way we can continue to improve our services. Please take a few minutes to complete the written survey that you may receive in the mail after you visit with us. Thank you!        Statement of Approval     Ordered          03/12/18 0341  I have reviewed and agree with all the recommendations and orders detailed in this document.  EFFECTIVE NOW     Approved and electronically signed by:  Melvin Danielle MD MyChart Information     [facility] gives you secure access to your electronic health record. If you see a primary care provider, you can also send messages to your care team and make appointments. If you have questions, please call your primary care clinic.  If you do not have a primary care provider, please call [phone] and they will assist you.        Care EveryWhere ID     This is your Care EveryWhere ID. This could be used by other organizations to access your [facility] medical records  [id number]        Equal Access to Services     PROSPER STEINER : Hadii aad ku hadasho Sozainali, waaxda luqadaha, qaybta kaalmada adeegyaspike, areli villatoro . So [facility] [phone].    ATENCIÓN: Si habla español, tiene a hinson disposición servicios gratuitos de asistencia lingüística. Llame al [phone].    We comply with applicable federal civil rights laws and [place] laws. We do not discriminate on the basis of race, color, national origin, age, disability, sex, sexual orientation, or gender identity.            After Visit Summary       This is your record. Keep this with you and show to your community pharmacist(s) and doctor(s) at your next visit.

## 2018-03-12 NOTE — ED NOTES
Doctor in room with patient, pt wishes to go home instead of admission,.  Oxygen turned off, will observe oxygen saturations

## 2018-03-12 NOTE — ED NOTES
[facility]  ED Nurse Handoff Report    Meggan Law is a 57 year old female   ED Chief complaint: Cough and Diarrhea  . ED Diagnosis:   Final diagnoses:   Diarrhea, unspecified type   Dehydration   Other specified hypotension     Allergies: No Known Allergies    Code Status: Full Code  Activity level - Baseline/Home:  Independent. Activity Level - Current:   Stand with Assist. Lift room needed: No. Bariatric: No   Needed: No   Isolation: No. Infection: Not Applicable.     Vital Signs:   Vitals:    03/12/18 0125 03/12/18 0126 03/12/18 0127 03/12/18 0215   BP:    (!) 89/47   Pulse:       Resp:       Temp:       TempSrc:       SpO2: (!) 89% (!) 89% 91% 94%       Cardiac Rhythm:  ,   Cardiac  Cardiac Rhythm: Normal sinus rhythm  Pain level: 0-10 Pain Scale: 2  Patient confused: No. Patient Falls Risk: Yes.   Elimination Status: Has voided   Patient Report - Initial Complaint: Pt to ER with c/o coughing up blood and diarrhea. Focused Assessment: Pt is a lung CA pt that comes in tonight with coughing up bright red blood and diarrhea which is not bloody, weakness, Pt also recently started a new chemo med that can cause diarrhea   Tests Performed: Labs. Abnormal Results: WBC 12.7 but all other labs WNLs.   Treatments provided: meds and IVF  Family Comments: Family wa here but went home  OBS brochure/video discussed/provided to patient:  Yes  ED Medications:   Medications   lactated ringers BOLUS 1,000 mL (1,000 mLs Intravenous New Bag 3/12/18 0139)   lactated ringers BOLUS 1,000 mL (0 mLs Intravenous Stopped 3/12/18 0138)   ibuprofen (ADVIL/MOTRIN) tablet 400 mg (400 mg Oral Given 3/12/18 0010)   0.9% sodium chloride BOLUS (0 mLs Intravenous Stopped 3/12/18 0057)   iopamidol (ISOVUE-370) solution 500 mL (48 mLs Intravenous Given 3/12/18 0052)     Drips infusing:  Yes  For the majority of the shift, the patient's behavior Green. Interventions performed werN/A.     Severe Sepsis OR Septic Shock  Diagnosis Present: No      ED Nurse Name/Phone Number: Diane CHAU Jerry,    2:22 AM    RECEIVING UNIT ED HANDOFF REVIEW    Above ED Nurse Handoff Report was reviewed: Yes  Reviewed by: Ariadne Mckinley on March 12, 2018 at 2:49 AM

## 2018-03-12 NOTE — ED PROVIDER NOTES
"  History     Chief Complaint:  Hemoptysis and Diarrhea    HPI   Meggan Law is a 57 year old female with a history of renal cell carcinoma metastasized to the lungs who presents to the emergency department today for evaluation of hemoptysis and diarrhea.  The patient has a history of renal cell carcinoma metastasized to the lungs, follows with Dr. Quiroga of [facility], and 6 months ago, started coughing up small specks of blood only in the ams.  This then worsened 3 months ago with even more blood, but typically only in the mornings.  She was then taken her off Opdivo chemo drug 9 days ago and started on cabozantinib yesterday.  Here, the patient states that she began feeling unwell with abdominal cramping last night and that today, her coughing worsened again with \"eraser sized,\" bright red blood in her mucus and has been more persistent throughout the day.  Since this morning, she has also had greater than 10 episodes of diarrhea, about 6 episodes of vomiting, lightheadedness, and dizziness.  She also reports a subjective fever last this afternoon as well as dysuria and states that her urine was dark and brown as well.  She denies any blood in her diarrhea, urgency, frequency, or nausea.  She last took her seizure medications this morning and her MS Contin at 2100 this evening, and was able to keep both of these down.      Allergies:  No Known Drug Allergies    Medications:    Calcium carbonate  Cannabis solution  Guaifenesin  Guaifenesin-codeine  Norco  Lacosamide  Lamotrigine  Keppra  Methimazole  MS Contin  Tessalon  Cabozantinib     Past Medical History:    Renal cell carcinoma, metastasized to the lungs  Seizures  Varicella  Encephalitis  Nontoxic multinodular goiter  Osteoporosis    Past Surgical History:    Tonsillectomy  Appendectomy    Family History:    CAD-father  CHF-father  COPD-mother  Osteoporosis-mother  Psoriasis-sister, brother  Schizophrenia-sister    Social History:  The " patient presented to the ED alone.  Smoking Status: Current Some Day Smoker, 35 years 0.5 ppd  Smokeless Tobacco: Never used  Alcohol Use: Yes  Marital Status:   [2]    Review of Systems   Constitutional: Positive for fever.   Respiratory: Positive for cough.    Gastrointestinal: Positive for abdominal pain, diarrhea and vomiting. Negative for blood in stool and nausea.   Genitourinary: Positive for dysuria. Negative for frequency and urgency.   Neurological: Positive for dizziness and light-headedness.   All other systems reviewed and are negative.    Physical Exam     Patient Vitals for the past 24 hrs:   BP Temp Temp src Pulse Resp SpO2   03/12/18 0215 (!) 89/47 - - - - 94 %   03/12/18 0127 - - - - - 91 %   03/12/18 0126 - - - - - (!) 89 %   03/12/18 0125 - - - - - (!) 89 %   03/12/18 0124 (!) 79/48 - - - - -   03/11/18 2259 - - - - - 90 %   03/11/18 2258 109/70 100.5  F (38.1  C) Temporal 99 20 (!) 88 %     Physical Exam  Constitutional:  Well developed, Frail but comfortable appearing.   HENT:  Bilateral external ears normal, Mucous membranes dry, Nose normal. Neck- Normal range of motion, Supple  Respiratory:  Normal breath sounds, No respiratory distress, No wheezing, Small spots of bright red blood in her mucus  Cardiovascular:  Normal heart rate, Normal rhythm, No murmurs,    GI:  Bowel sounds normal, Soft, Nondistended, Mild right upper quadrant tenderness, no rebound or guarding.  Musculoskeletal:  Intact distal pulses, No edema, grossly unremarkable range of motion   Integument:  Warm, Dry. Decreased skin turgor  Neurologic:  Alert, attentive and appropriately oriented  Psychiatric:  Mood and affect normal.     Emergency Department Course     Imaging:  Radiology findings were communicated with the patient and Admitting MD who voiced understanding of the findings.    CT Chest/Abdomen/Pelvis w contrast  Possible mild left sided colitis. Left renal mass and increasing metastatic disease.  Reading per  radiology    Laboratory:  Laboratory findings were communicated with the patient and Admitting MD who voiced understanding of the findings.    CBC: WBC 12.9 (H), HGB 12.6,  (H)   CMP:  (L), Albumin 3.2 (L), Protein total 6.6 (L) o/w WNL (Creatinine 0.64)  Lipase: 44 (L)  Lactic Acid (Collected at 0005): 1.3  Blood culture: Pending x2    UA with micro: Leukocyte esterase moderate (A), WBC/HPF 12 (H) o/w negative  Urine culture: Pending    Interventions:  0003 LR 1 L IV  0010 ibuprofen 400 mg PO  0139 LR 1 L IV    Emergency Department Course:  Nursing notes and vitals reviewed.  2311: I performed an exam of the patient as documented above.   IV was inserted and blood was drawn for laboratory testing, results above.  The patient provided a urine sample here in the emergency department. This was sent for laboratory testing, findings above.  The patient was sent for a CT Chest/Abdomen/Pelvis while in the emergency department, results above.   0135: I rechecked the patient and updated her on the results of laboratory studies.  0204: I rechecked the patient and updated her on the results of further laboratory and imaging studies. The patient has systolic blood pressures in the 90's at baseline, but has had systolic pressures in the 70-80's which is after a liter of fluids.  I discussed the treatment plan with the patient.  She expressed understanding of this plan and consented to admission.  All questions were answered.   0224: I discussed the patient with Dr. Danielle of the hospitalist service, who will admit the patient for observation.      Impression & Plan      Medical Decision Makin-year-old female presenting for diarrhea, increased cough hemoptysis.  She was febrile here and developed hypotension here despite IV fluids.  Her new chemo regimen has a high chance (>60%) of causing diarrhea.  Broad differential was considered for diarrhea abdominal pain as well as hemoptysis.  Ultimately, workup was  notable for labs generally within normal limits, CT showing possible colitis.  The patient's baseline systolic pressures are typically within the 90's, though they have been in the 70's-80's here and this was after a liter of IV fluids.  With a lactic acid of 1.3, I do not believe she is septic.  However, given these low values, I suspect this reflects a degree of dehydration with her history.  Therefore, I will admit her to obs for further hydration.  She is agreeable to this.    Diagnosis:    ICD-10-CM    1. Diarrhea, unspecified type R19.7    2. Dehydration E86.0    3. Other specified hypotension I95.89      Disposition:   Home    Scribe Disclosure:  I, Yolanda Mccullough, am serving as a scribe at 11:11 PM on 3/11/2018 to document services personally performed by Selin Becerril MD, based on my observations and the provider's statements to me.    3/11/2018   [facility] EMERGENCY DEPARTMENT    0342: I was notified that after his evaluation of the patient, with improved blood pressures, Dr. Danielle felt that the patient was appropriate for discharge to home, had seen and evaluated her and was discharging her home.       Selin Becerril MD  03/12/18 0602

## 2018-03-12 NOTE — ED NOTES
Patient states she is coughing up blood and having diarrhea last night. Fever today. Lung cancer patient. ABC intact alert and no distress.     See copy of med list

## 2018-03-12 NOTE — CONSULTS
Consult Date:  03/12/2018      REASON FOR CONSULTATION:  I was asked by Dr. Becerril to evaluate this patient for possible admission to the hospital.      HISTORY OF PRESENT ILLNESS:  Ms. Law is a 57-year-old female with a history of metastatic renal cell carcinoma.  She follows at [facility] where she receives her cancer treatment.  She presented to the hospital this evening for concerns about vomiting and diarrhea.  She has also been having hemoptysis which is a chronic condition for her.  The patient reports that she has recently had her chemotherapeutic medications changed.  She was taken off her Opdivo chemotherapeutic 9 days ago and started on cabozantinib yesterday.  Apparently since starting this medication, she has been having some abdominal cramping and diarrhea symptoms as well as some emesis.  She also thinks she has been coughing up a bit more blood than usual in the past 24-48 hours.      When I saw the patient, she was feeling much improved.  She had received a liter of IV fluids.  I discussed the case with Dr. Becerril.  I decided to reevaluate the patient after her second bag of IV fluids.  After her second bag of IV fluids, her blood pressure was much improved with systolic blood pressures near 100.  She normally states her baseline is in the 90s.  She felt much better and was very interested in going home.  Her other vital signs were notable for some mild hypoxia.  Her saturations did drift down into the 88% to 89% range on room air, but would improve with some deep breathing into the mid 90% range.  She did not feel short of breath.  She does have a history of smoking.      Upon arrival to the ER, vital signs include blood pressure 109/70 with a heart rate of 99.  She had a temperature of 100.5 degrees and saturations of 90% on room air.  Her systolic blood pressure did drift to 80 while in the ER, but after 2 liters of fluid, systolic blood pressures are now 100.      Her workup in the ER  included labs and imaging.  White cell count was 12 and lipase is normal.  Sodium 132, urinalysis showed no evidence of infection.  Blood culture was obtained and urine culture was obtained, both of which are pending.  Chest, abdomen and pelvis CT scan was also done showing extensive pulmonary metastatic disease, moderately increased from the prior study performed 10/2017.  Lactic acid was normal at 1.3.      PAST MEDICAL HISTORY:   1.  Metastatic renal cell cancer to the lungs.   2.  Distant history of seizures as a child.   3.  History of encephalitis, again as a child.   4.  Goiter.   5.  Osteoporosis.   6.  Appendectomy.      CURRENT MEDICATIONS:  Chemotherapeutic medications as above.  Additional medicines include Norco, Lamictal, Keppra, methimazole, MS Contin.      She denies any recent antibiotics.      ALLERGIES:  None.      FAMILY HISTORY:  Reviewed.  Nothing contributory to this admission.      SOCIAL HISTORY:  The patient is an active smoker.  Denies alcohol.      REVIEW OF SYSTEMS:  Please see HPI for details.  Comprehensive greater than 10-point review of systems otherwise negative except that detailed above.      PHYSICAL EXAMINATION:   VITAL SIGNS:  Blood pressure is currently 102/61 with a heart rate of 83.  Temperature 100.5 degrees, saturation is 90% on room air currently.   GENERAL:  The patient appears nontoxic and in no acute distress.  She appears awake, alert and oriented x3.  She is laughing and joking with the examiner.  Does not appear to be in any distress.   HEENT:  Head is atraumatic.  Sclerae are white.  Eyelids are normal.  Conjunctivae are normal.  Extraocular movements are intact.   NECK:  Supple.  No cervical or supraclavicular lymphadenopathy.   HEART:  Regular rate and rhythm.  No significant murmurs.  No lower extremity edema.   LUNGS:  Clear to auscultation bilaterally.  No intercostal retractions.  No conversational dyspnea.   ABDOMEN:  Nontender, nondistended.  Soft.  No  masses.  No organomegaly.  Very benign appearing exam.   EXTREMITIES:  No edema.   SKIN:  Reveals no rash.  No jaundice.  Skin is dry to touch.   NEUROLOGIC:  Cranial nerves II-XII are intact.  Moves all extremities appropriately.  Sensation intact to light touch in the upper and lower extremities bilaterally.   PSYCHIATRIC:  The patient is awake, alert and oriented x3.  Mood and affect are normal and appropriate.      LABORATORY AND IMAGING DATA:  Reviewed above in HPI.      IMPRESSION AND PLAN:  Ms. Law is a 57-year-old female with a history of metastatic renal cell carcinoma, active smoker, presented to the Emergency Department for concerns about emesis and diarrhea.  She was noted to have a low-grade fever and mild leukocytosis.  She was initially hypotensive, but has responded well to IV fluids.   1.  Emesis and diarrhea:  I suspect either secondary to new chemotherapeutic medication versus a viral illness in the setting of low-grade fever and mild leukocytosis.  No recent antibiotics.  I doubt Clostridium difficile colitis here.  No abdominal pain with a very benign appearing abdominal exam.  Symptoms have improved.   2.  Mild hypoxia:  I suspect secondary to her pulmonary metastatic disease in the setting of smoking history.  Likely she has some underlying chronic obstructive pulmonary disease, although this is not a formal diagnosis in this patient.  I encouraged her to follow up with her primary care physician later on this week to recheck her oxygen saturation levels.  Does not meet criteria for home oxygen, currently based on her numbers here in the ER.   3.  Dehydration, resulting in asymptomatic hypotension:  Resolved with IV fluids in the ER.  I encouraged her to continue oral hydration on discharge and to consider using over-the-counter antidiarrheal medications if necessary.  If symptoms persist, I encouraged her to follow up for reevaluation.      I feel the patient is safe to discharge from  the Emergency Department without need for admission to the hospital.  I discussed with the patient and she is in agreement and would like to go home.  She appears safe to discharge at this time.         KELL DELGADO MD             D: 2018   T: 2018   MT: CHRISTIAN      Name:     BJ DASH   MRN:      [id number]        Account:       [id number]   :      1960           Consult Date:  2018      Document: [id number]

## 2018-07-12 PROBLEM — R09.02 HYPOXIA: Status: ACTIVE | Noted: 2018-01-01

## 2018-07-12 NOTE — PLAN OF CARE
Problem: Patient Care Overview  Goal: Plan of Care/Patient Progress Review  Outcome: No Change  ROOM # 207    Living Situation (if not independent, order SW consult):Ind with   Facility name:  :     Activity level at baseline: Ind  Activity level on admit: Ind      Patient registered to observation; given Patient Bill of Rights; given the opportunity to ask questions about observation status and their plan of care.  Patient has been oriented to the observation room, bathroom and call light is in place.    Discussed discharge goals and expectations with patient/family.

## 2018-07-12 NOTE — ED NOTES
[facility]  ED Nurse Handoff Report    Meggan Law is a 58 year old female   ED Chief complaint: Shortness of Breath  . ED Diagnosis:   Final diagnoses:   None     Allergies: No Known Allergies    Code Status: not on file  Activity level - Baseline/Home:  Independent. Activity Level - Current:   Stand with Assist. Lift room needed: No. Bariatric: No   Needed: No   Isolation: No. Infection: Not Applicable.     Vital Signs:   Vitals:    07/12/18 1145 07/12/18 1200 07/12/18 1245 07/12/18 1300   BP: (!) 85/57 (!) 85/55 (!) 87/54 (!) 88/56   Pulse:       Resp: 20 25 19    Temp:       TempSrc:       SpO2: 96% 95% 98% 95%       Cardiac Rhythm:  ,      Pain level: 0-10 Pain Scale: 5  Patient confused: No. Patient Falls Risk: Yes.   Elimination Status: Has voided   Patient Report - Initial Complaint: SOB. Focused Assessment: Pt had thoracentesis yesterday.  Presents today with SOB, lungs coarse bilat, greater right than left.  Presenting oxygen saturations 88% on room air.  Placed on 2 liters oxygen NC.  Tests Performed: CT/rad/labs. Abnormal Results:   CT Chest Pulmonary Embolism w Contrast   Preliminary Result   IMPRESSION:   1. No pulmonary embolism is seen. No acute thoracic aortic   abnormality.   2. Progression of pulmonary malignancy with enlargement of several   nodular masses. Many of the nodules are otherwise stable in   appearance.   3. New but small and mildly loculated right pleural fluid.      XR Chest 2 Views   Preliminary Result   IMPRESSION:  Ill-defined nodules or nodular infiltrates in both lungs   with some apical sparing. Comparison with the topogram and CT scan   from 10/21/2017, suggests significant worsening. Very small right   pleural effusion. No pneumothorax.        Labs Ordered and Resulted from Time of ED Arrival Up to the Time of Departure from the ED   CBC WITH PLATELETS DIFFERENTIAL - Abnormal; Notable for the following:        Result Value    Hemoglobin 15.8  (*)     Platelet Count 483 (*)     Absolute Neutrophil 8.7 (*)     All other components within normal limits   COMPREHENSIVE METABOLIC PANEL - Abnormal; Notable for the following:     Glucose 103 (*)     All other components within normal limits   INR   TROPONIN I   TSH WITH FREE T4 REFLEX   CARDIAC CONTINUOUS MONITORING   .   Treatments provided: supportive oxygen, pain meds  Family Comments:   OBS brochure/video discussed/provided to patient:  Yes, video watched by pt and spouse.  Handout also provided.  ED Medications:   Medications   fentaNYL (PF) (SUBLIMAZE) injection 50 mcg (50 mcg Intravenous Given 7/12/18 1110)   iopamidol (ISOVUE-370) solution 500 mL (47 mLs Intravenous Given 7/12/18 1221)   0.9% sodium chloride BOLUS (0 mLs Intravenous Stopped 7/12/18 1231)     Drips infusing:  No  For the majority of the shift, the patient's behavior Green. Interventions performed were rounding.     Severe Sepsis OR Septic Shock Diagnosis Present: No      ED Nurse Name/Phone Number: Alka Piña,   1:10 PM    RECEIVING UNIT ED HANDOFF REVIEW    Above ED Nurse Handoff Report was reviewed: Yes  Reviewed by: Carmina Lovett on July 12, 2018 at 2:31 PM

## 2018-07-12 NOTE — H&P
Admitted:     07/12/2018      DATE OF ADMISSION:  07/12/2018      PRIMARY CARE PHYSICIAN:  Dr. Shannon Thomas      The patient is a poor historian, so information is obtained from the patient, ER physician, oncologist Dr. Quiroga, and Epic chart.      CHIEF COMPLAINT:  Shortness of breath and right anterior chest pain since this morning.      HISTORY OF PRESENT ILLNESS:  Ms. Meggan Law is a 58-year-old female with a history of metastatic renal cell cancer, mets to the lungs on cabozantinib chemo under the care of Dr. Quiroga from [facility].  The patient has had increasing shortness of breath, and this started this month.  Underwent CAT scan that showed new large right pleural effusion with near-complete collapse of the right lower lobe compatible with compressive atelectasis with extensive pulmonary metastasis that appeared at least slightly progressed.  She underwent a thoracentesis removing~1300 mL of fluid, required 3 of fentanyl before pain subsided and was able to go home.  This morning, she woke up at about 3:00 with sharp, right anterior chest pain and then was able to go back to sleep only to awaken again 3 hours later with similar chest pain associated with shortness of breath.  She was found to have an O2 saturation in the low 80s this morning by her pulse oximeter and therefore decided to come into the hospital.  In the emergency room, she was evaluated with labs that were unremarkable.  A chest x-ray showed ill-defined nodules or nodular infiltrates in both lungs, suggesting significant worsening compared to a CT done in 10/21/2017, with a very small right pleural effusion, no pneumothorax.  A CT of the chest showed no pulmonary embolus.  There was progression of pulmonary malignancy with enlargement of several more nodular masses with many of the nodules otherwise stable in appearance and new, small, mildly-loculated right pleural fluid.  She did receive fentanyl, which seemed  to improve her pain, but continued to require supplemental oxygen.  She was unable to be discharged home with a new order of oxygen and therefore is being admitted under observation.      PAST MEDICAL HISTORY:     1.  Renal cell cancer with metastasis to the lungs.   2.  Status post recent right thoracentesis on 07/11/2018 for ~1300 mL of pleural fluid.   3.  Seizure disorder since age 2 with approximately 13-20 seizures per month that have actually decreased since she has been retired since her diagnosis of renal cell cancer.   4.  Osteoporosis.   5.  Ongoing tobacco abuse.   6.  Nontoxic multinodular goiter.      PAST SURGICAL HISTORY:     1.  Tonsillectomy.   2.  Appendectomy.      FAMILY HISTORY:  Both mother and maternal grandmother had cancer, unknown type.      SOCIAL HISTORY:     1.  She is , has 1 child, 21 years old.  She recently retired.     2.  Positive tobacco use, 1 pack per day for the past over 40 years.   3.  No alcohol or recreational drug use.      CODE STATUS:  DNR/DNI.      MEDICATIONS:   1.  Tessalon Perles 100 mg 3 times a day.   2.  Cabometyx 40 mg daily.   3.  Os-Gaetano 1200 mg daily.   4.  Robitussin AC at bedtime.   5.  Norco 5/325 mg 2 tablets q.4 h. p.r.n.   6.  Vimpat 100 mg in the morning and 200 mg every evening.   7.  Lamictal 300 mg daily.   8.  Keppra 1000 mg 2 times a day.   9.  Tapazole 5 mg every evening.   10.  MS Contin 15 mg q.12 h.   11.  Multivitamin daily.   12.  Zometa infusion.      ALLERGIES TO MEDICATIONS:  NO KNOWN DRUG ALLERGIES.        REVIEW OF SYSTEMS:  The patient has dropped 10 pounds in the past year.  She does admit to decreased appetite.  Denies any difficulty swallowing.  She has had increasing shortness of breath with minimal exertion, especially since the start of this month and has had associated mid-substernal discomfort.  She denies any change in her bowel habits or constipation.  Denies dysuria, although she has occasional stress incontinence.   Denies any focal weakness or numbness or tingling.  The rest of the 10-point review of systems reviewed and negative.      PHYSICAL EXAMINATION:   GENERAL:  The patient is a thinly-built, moderately-nourished female, awake, alert, oriented x 3, in no acute respiratory distress.   VITAL SIGNS:  Temperature 97.4, pulse 100, respirations 20.  Blood pressure initially was 112/75, dropped down to 87/54 after receiving fentanyl in the emergency room.  O2 saturation on room air 88% and 90% on 2 liters nasal cannula.   SKIN:  Warm, dry with no rash noted.   NECK:  Supple, no elevated JVD, thyromegaly or lymphadenopathy.  Carotids present with no bruits.   EYES:  Anicteric.   ENT:  Clear     LUNGS:  There are bilateral crepitations, right more than the left, at least 1/3 up.  No wheezing heard.   HEART:  Regular S1, S2, without any murmurs, gallops or rubs heard.   ABDOMEN:  Scaphoid, soft, nontender, no organomegaly or masses are palpable.  There is no reproducible tenderness on my exam.  Bowel sounds present.   EXTREMITIES:  With no pitting edema, clubbing or cyanosis.  Peripheral pulses are intact.      DIAGNOSTICS:  Chemistry:  Electrolytes, LFTs normal, undetectable troponin, normal TSH.  Hematology:  White count 11.0, hemoglobin 15.8.  Platelets are 483.        IMAGING:  As mentioned above.      EKG:  Sinus rhythm, rate at 92 per minute, left axis deviation, left anterior fascicular block and nonspecific ST-T wave changes.      IMPRESSION AND PLAN:       Ms. Meggan Law is a delightful, 58-year-old female with a known history of metastatic renal cell cancer on cabozantinib under the care of [facility] with progressive pulmonary metastasis and recent development of symptomatic right pleural effusion, status post thoracentesis done yesterday and presenting today to the hospital with right-sided pleuritic chest pain, shortness of breath and hypoxia.  I did speak to Dr. Quiroga from [facility],  who feels that the patient will be continuing with her chemotherapy, and so, we will have the patient follow up with him as an outpatient.  I do not see any need for an Oncology consultation during this observation visit.     1.  Acute hypoxia in the setting of metastatic renal cell cancer with a slight progression of pulmonary metastasis, recent thoracentesis for a large pleural effusion on 07/11/2018.  Workup in the hospital was negative for pulmonary embolus or acute infection.  Most likely, the pain is secondary to the recent thoracentesis and pain resulting from that.  The patient will be admitted under observation for pain control.  The patient may need oxygen for discharge.     2.  Uncontrolled pain.  The patient has been maintained at home on MS Contin 15 mg twice a day in addition to her Norco 1-2 pills every 4 hourly with breakthrough pain since yesterday.  The patient did have some relief with the fentanyl that she received yesterday post-thoracentesis and also in the emergency room.  I will request a Palliative consult for pain management and also plan of care.     3.  History of seizures, stable, although she does have stable breakthrough seizures on Keppra, Lamictal and Vimpat.  We will continue [id number] medications and put her on seizure precautions.     4.  History of nontoxic multinodular goiter.  Continue [id number] methimazole.  Normal TSH.       5.  Ongoing tobacco use.  Encouraged to quit smoking.       6.  Metastatic renal cell cancer with slight progression of pulmonary mets, according to the patient's oncologist.  I did call Dr. Quiroga from [facility], who recommends continuing the current treatment and have her follow up with their office after discharge.      Deep venous thrombosis prophylaxis.  Ambulate.      Code status as discussed with the patient DNR/DNI.      DISPOSITION:  The patient will be admitted under observation and hopefully discharged tomorrow  with the pain medication plan and supplemental oxygen for home.         SILVANO LEIGH MD             D: 2018   T: 2018   MT: CHRISTIAN      Name:     BJ DASH   MRN:      [id number]        Account:      [id number]   :      1960        Admitted:     2018                   Document: [id number]

## 2018-07-12 NOTE — IP AVS SNAPSHOT
[facility] Observation Department    [address]    [facility] [address]    Phone:  [phone]                                       After Visit Summary   7/12/2018    Meggan Law    MRN: [id number]           After Visit Summary Signature Page     I have received my discharge instructions, and my questions have been answered. I have discussed any challenges I see with this plan with the nurse or doctor.    ..........................................................................................................................................  Patient/Patient Representative Signature      ..........................................................................................................................................  Patient Representative Print Name and Relationship to Patient    ..................................................               ................................................  Date                                            Time    ..........................................................................................................................................  Reviewed by Signature/Title    ...................................................              ..............................................  Date                                                            Time

## 2018-07-12 NOTE — CONSULTS
[facility]    Palliative Care Consultation   Text Page    Date of Admission:  7/12/2018    Assessment & Plan   Meggan Law is a 58 year old female who was admitted on 7/12/2018. I was asked by Hospitalist Emma Angela MD to see the patient for pain management, hx metastatic CA.    Recommendations:  1.Decisional Capacity -  Intact. Patient does not have an advance directive. Per  informed consent policy next of kin should be involved in all consent and decision making.    2. Shortness of breath -  Meggan states it is resolved, but concerned that she is now needing oxygen which she previously has not needed at home.      3. Pain - States having pain flare this morning, but otherwise it had been well managed with current home pain regime MS contin 15 mg every 12 hours.    4. Unintentional weight loss - Weight was documented as 105 pounds on 7/8/2009. No documented current weight but she indicates loosing more than 10 pounds during the past year.    4. Spiritual Care - Oriented to Spiritual Health and Social Work Services as part of Palliative Care team.    5. Care Planning - Plan to return home with assistance from her .    Goal of Care: DNR/DNI - Restorative care.     Disease Process/es & Symptoms:  Meggan Law is a 58 year old patient admitted 7/12/2018 with symptoms of shortness of breath. 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 She is followed by Oncologist Radha Woodall MD at [facility] and was recently evaluated at [facility] in second opinion and was started on Pazopanib at 400 mg dose and is currently at 600 mg dose with plans to refer to cytoreductive surgery soon. Yesterday she underwent right thoracentesis with 1380 cc removed.     Findings & plan of care  "discussed with: Bedside nurse Dasia Lee RN  Follow-up plan from palliative team: Will sign off as the plan is to have patient dismiss in the morning. If she should have questions, please feel free to contact our office.  Thank you for involving us in the patient's care.     Marie Aaron MS, RN, CNS, APRN, ACHPN, FAACVPR  Pain and Palliative Care  Pager [phone]  Office [phone]     Time Spent on this Encounter   I spent 40 minutes (4:10 PM - 4:50 PM) in assessment of the patient, counseling and discussion with the patient as documented in sections below. Another 35 minutes in review of chart, documentation, coordination of care and discussion with the health care team.    Primary Care Physician   Shannon Thomas    Chief Complaint   Shortness of Breath    History is obtained from the patient and electronic health record    Decision-Making & Goals of Care:  Discussed on July 12, 2018 with Marie VERDIN, CNS:     Met with Meggan as she was sitting at the edge of the bed. She explained that she was having a pain in her right lower chest at the site of yesterday's thoracentesis, but it is currently resolved. Her main concern is in needing oxygen, as she currently wasn't using it at home. She explained \"Everything was going fine [place] first of [place].\" She was walking one mile a day, but was not walking during her visit to [place]. When she returned her walks were limited to about a half a mile and she felt like she was not able to tolerate the heat. She plans to slowly return to her previous level of activity. In asking if that would not be possible, she exclaimed \"I'm not ready to think about that\". She confirmed her , Arvind Law would make decisions if she were unable. She also confirmed her decision for do not resuscitate. She declined offer for POLST, as she indicated \"Arvind knows what not to do\".      Patient has decision-making capacity " Intact  Patient has no known legal document designating a decision maker. Per policy next of kin is the designated decision maker. See System Informed Consent policy for guidance.  Physician orders for life-sustaining treatment (POLST) form is not completed  Code Status: Do not resuscitate / Do not intubate     Past Medical History   I have reviewed this patient's medical history and updated it with pertinent information if needed.   Past Medical History:   Diagnosis Date     Cancer (H)      Seizures (H)        Past Surgical History   I have reviewed this patient's surgical history and updated it with pertinent information if needed.  History reviewed. No pertinent surgical history.    Prior to Admission Medications   Prior to Admission Medications   Prescriptions Last Dose Informant Patient Reported? Taking?   Benzonatate (TESSALON PERLES PO) 7/11/2018 at Unknown time  Yes Yes   Sig: Take 100 mg by mouth 3 times daily   Cabozantinib S-Malate (CABOMETYX) 40 MG 7/11/2018 at Unknown time  Yes Yes   Sig: Take 40 mg by mouth daily   HYDROcodone-acetaminophen (NORCO) 5-325 MG per tablet 7/12/2018 at am  Yes Yes   Sig: Take 2 tablets by mouth every 4 hours as needed for pain   Lacosamide (VIMPAT) 100 MG TABS tablet 7/11/2018 at am  Yes Yes   Sig: Take 100 mg by mouth every morning   LamoTRIgine (LAMICTAL) 300 MG TB24 7/11/2018 at am  Yes Yes   Sig: Take 1 tablet by mouth daily   Multiple Vitamins-Minerals (MULTIVITAMIN ADULT) TABS 7/11/2018 at Unknown time  Yes Yes   Sig: Take 1 tablet by mouth daily   calcium carbonate (OS- MG [place]. [place]) 600 MG tablet 7/11/2018 at Unknown time  Yes Yes   Sig: Take 1,200 mg by mouth daily   guaiFENesin-codeine (ROBITUSSIN AC) 100-10 MG/5ML SOLN solution 7/11/2018 at pm  Yes Yes   Sig: Take 1 tsp. by mouth At Bedtime   lacosamide (VIMPAT) 200 MG TABS tablet 7/11/2018 at pm  Yes Yes   Sig: Take 200 mg by mouth every evening   levETIRAcetam (KEPPRA) 1000 MG TABS 7/11/2018 at Unknown  time  Yes Yes   Sig: Take 1 tablet by mouth 2 times daily   methimazole (TAPAZOLE) 5 MG tablet 7/11/2018 at pm  Yes Yes   Sig: Take 5 mg by mouth every evening   morphine (MS CONTIN) 15 MG 12 hr tablet 7/12/2018 at am  Yes Yes   Sig: Take 15 mg by mouth every 12 hours   zoledronic acid (ZOMETA) 4 MG/100ML SOLN Due 7/18/18  Yes Yes   Sig: Inject 4 mg into the vein once      Facility-Administered Medications: None     Allergies   No Known Allergies    Social History   Living situation: Lives with her  Arvind Law  Family system:  Arvind, son Rubén and   Functional status: Nees some assistance from her  with home care.   History of substance use/abuse:  reports that she quit smoking 2 days ago. She has never used smokeless tobacco.  reports that she does not drink alcohol.  [employer] affiliation: [employer]    Family History   I have reviewed this patient's family history and updated it with pertinent information if needed.   No family history on file.    Review of Systems   The 10 point Review of Systems is negative other than noted in the HPI or here.     Palliative Symptom Review (0=no symptom/no concern, 1=mild, 2=moderate, 3=severe):      Pain: 1-mild      Fatigue: 1-mild      Nausea: 0-none      Constipation: 0-none      Diarrhea: 0-none      Depressive Symptoms: 0-none      Anxiety: 1-mild      Drowsiness: 0-none      Poor Appetite: 1-mild      Shortness of Breath: 0-none      Insomnia: 0-none        Physical Exam   Temp:  [97.4  F (36.3  C)-98.5  F (36.9  C)] 98.5  F (36.9  C)  Pulse:  [100] 100  Heart Rate:  [68-83] 82  Resp:  [14-25] 22  BP: ()/(54-75) 101/58  SpO2:  [88 %-98 %] 92 %  0 lbs 0 oz  GEN:  Petite, cachetic and frail  female. Alert, oriented x 3, appears comfortable, no apparent distress.  HEENT:  Normocephalic/atraumatic, no scleral icterus, no nasal discharge, mouth moist.  CV:  RRR, S1, S2; no murmurs or other irregularities noted.  +3 DP/PT pulses bilaterally;  no edema BLE.  RESP:  Clear to auscultation bilaterally without rales/rhonchi/wheezing/retractions.  Symmetric chest rise on inhalation noted.  Normal respiratory effort.  ABD:  Rounded, soft, non-tender/non-distended.  +BS  EXT: CMS intact x 4.     M/S:   Right lower chest wall is tender to palpation at site of thoracentesis.    SKIN:  Warm and dry to touch, no exanthems noted in the visualized areas.    NEURO: Symmetric strength +5/5.  Sensation to touch intact all extremities.   There is no area of allodynia or hyperesthesia.  Psych:  Normal affect.  Calm, cooperative, conversant appropriately.     Data   Results for orders placed or performed during the hospital encounter of 07/12/18   XR Chest 2 Views    Narrative    CHEST TWO VIEWS  7/12/2018 11:04 AM    HISTORY:  Hypoxia, shortness of breath.  Right thoracentesis yesterday  with 1.8 L taken off.  Evaluate for recurrent pleural effusion versus  pneumothorax.     COMPARISON:  Chest CT 10/21/2017.      Impression    IMPRESSION:  Ill-defined nodules or nodular infiltrates in both lungs  with some apical sparing. Comparison with the topogram and CT scan  from 10/21/2017, suggests significant worsening. Very small right  pleural effusion. No pneumothorax.   CT Chest Pulmonary Embolism w Contrast    Narrative    CT CHEST PULMONARY EMBOLISM WITH CONTRAST  7/12/2018 12:31 PM    HISTORY:  Acute worsening shortness of breath in 24 hours.  Concern  for PE in setting of metastatic lung disease and recent thoracentesis  yesterday.     TECHNIQUE: Scans obtained from the apices through the diaphragm with  IV contrast. 47 mL Isovue-370 injected. Radiation dose for this scan  was reduced using automated exposure control, adjustment of the mA  and/or kV according to patient size, or iterative reconstruction  technique.    COMPARISON:  CT chest, abdomen, and pelvis 3/12/2018.    FINDINGS:    Mediastinum: No acute thoracic aortic abnormality is seen. No evidence  for pulmonary  embolism identified. Right paratracheal enlarged lymph  node is 1.6 x 0.8 cm, stable, series 4 image 52. Subcarinal enlarged  lymph node is 2.3 x 0.9 cm, previously 2.3 x 0.6 cm, series 4 image  62. AP window lymph nodes appear grossly stable.    Lungs: Disseminated bilateral pulmonary metastatic disease again  identified. There are several larger nodules and masses. An example at  the posterior right lower lobe is 3.9 x 3.1 cm, previously 2.1 x 2.6  cm, series 5 image 86. Adjacent nodular masses also appear larger.  Larger lateral left upper lobe nodule is 1.1 cm, previously 0.7 cm,  series 5 image 54. Other nodules appears stable in size.    Additional findings: Small right pleural fluid, some loculated newly  identified. Upper abdomen images show no acute abnormality. Previously  noted left-sided renal mass is difficult to visualize on this  examination. Subacute bilateral rib fractures.      Impression    IMPRESSION:  1. No pulmonary embolism is seen. No acute thoracic aortic  abnormality.  2. Progression of pulmonary malignancy with enlargement of several  nodular masses. Many of the nodules are otherwise stable in  appearance.  3. New but small and mildly loculated right pleural fluid.   CBC with platelets differential   Result Value Ref Range    WBC 11.0 4.0 - 11.0 10e9/L    RBC Count 5.01 3.8 - 5.2 10e12/L    Hemoglobin 15.8 (H) 11.7 - 15.7 g/dL    Hematocrit 46.9 35.0 - 47.0 %    MCV 94 78 - 100 fl    MCH 31.5 26.5 - 33.0 pg    MCHC 33.7 31.5 - 36.5 g/dL    RDW 14.6 10.0 - 15.0 %    Platelet Count 483 (H) 150 - 450 10e9/L    Diff Method Automated Method     % Neutrophils 78.5 %    % Lymphocytes 11.6 %    % Monocytes 8.4 %    % Eosinophils 0.8 %    % Basophils 0.2 %    % Immature Granulocytes 0.5 %    Nucleated RBCs 0 0 /100    Absolute Neutrophil 8.7 (H) 1.6 - 8.3 10e9/L    Absolute Lymphocytes 1.3 0.8 - 5.3 10e9/L    Absolute Monocytes 0.9 0.0 - 1.3 10e9/L    Absolute Eosinophils 0.1 0.0 - 0.7 10e9/L     Absolute Basophils 0.0 0.0 - 0.2 10e9/L    Abs Immature Granulocytes 0.1 0 - 0.4 10e9/L    Absolute Nucleated RBC 0.0    INR   Result Value Ref Range    INR 0.96 0.86 - 1.14   Troponin I   Result Value Ref Range    Troponin I ES <0.015 0.000 - 0.045 ug/L   Comprehensive metabolic panel   Result Value Ref Range    Sodium 136 133 - 144 mmol/L    Potassium 3.8 3.4 - 5.3 mmol/L    Chloride 100 94 - 109 mmol/L    Carbon Dioxide 24 20 - 32 mmol/L    Anion Gap 12 3 - 14 mmol/L    Glucose 103 (H) 70 - 99 mg/dL    Urea Nitrogen 13 7 - 30 mg/dL    Creatinine 0.84 0.52 - 1.04 mg/dL    GFR Estimate 69 >60 mL/min/1.7m2    GFR Estimate If Black 83 >60 mL/min/1.7m2    Calcium 9.0 8.5 - 10.1 mg/dL    Bilirubin Total 0.7 0.2 - 1.3 mg/dL    Albumin 3.7 3.4 - 5.0 g/dL    Protein Total 7.4 6.8 - 8.8 g/dL    Alkaline Phosphatase 89 40 - 150 U/L    ALT 32 0 - 50 U/L    AST 22 0 - 45 U/L   TSH with free T4 reflex   Result Value Ref Range    TSH 2.21 0.40 - 4.00 mU/L   EKG 12-lead, tracing only   Result Value Ref Range    Interpretation ECG Click View Image link to view waveform and result

## 2018-07-12 NOTE — ED PROVIDER NOTES
History     Chief Complaint:  Shortness of Breath    HPI   Meggan Law is a 58 year old female with a history of metastatic renal cell carcinoma who presents to the Emergency Department for evaluation of persistent shortness of breath and chest discomfort following thoracentesis yesterday. The patient had a right thoracentesis performed yesterday where 1,380 cc's were drained and she required 3 of Fentanyl before pain somewhat subsided and she was able to going home. This morning the patient woke up with worsening shortness of breath and chest pain with O2 SATs around 85%. She is not chronically on oxygen at home. She is not chronically on oxygen at home. The patient was given Morphine and Hydrocodone this morning which did not alleviate her symptoms. She denies any fevers, vomiting, leg swelling or abdominal pain.    Allergies:  No known drug allergies    Medications:    KEPPRA 500 MG TAB              CARBATROL 200 MG 12 HR CAP    lamotrigine (LAMICTAL) 100 mg tablet  VENTOLIN HFA 90 mcg/actuation inhaler           azithromycin (ZITHROMAX) 250 mg tablet          VIMPAT 100 mg tab tablet            VIMPAT 50 mg tab tablet            NORCO          Past Medical History:    Epilepsy  Renal cell carcinoma    Past Surgical History:    Appendectomy    Family History:    No past pertinent family history.    Social History:  The patient was accompanied to the ED by .  Smoking Status: Current everyday smoker  Alcohol Use: no  Marital Status:   [2]     Review of Systems   Constitutional: Negative for fever.   Respiratory: Positive for shortness of breath.    Cardiovascular: Positive for chest pain. Negative for leg swelling.   Gastrointestinal: Negative for abdominal pain and vomiting.   All other systems reviewed and are negative.    Physical Exam   First Vitals:  Patient Vitals for the past 24 hrs:   BP Temp Temp src Pulse Heart Rate Resp SpO2   07/12/18 1444 101/58 98.5  F (36.9  C) Oral - 82 22 92  %   07/12/18 1430 94/64 - - - 70 22 95 %   07/12/18 1415 98/63 - - - 77 22 92 %   07/12/18 1345 101/57 - - - 75 14 93 %   07/12/18 1315 97/66 - - - 75 17 94 %   07/12/18 1300 (!) 88/56 - - - 71 - 95 %   07/12/18 1245 (!) 87/54 - - - 68 19 98 %   07/12/18 1200 (!) 85/55 - - - 75 25 95 %   07/12/18 1145 (!) 85/57 - - - 71 20 96 %   07/12/18 1130 (!) 89/56 - - - 78 24 95 %   07/12/18 1115 90/59 - - - 80 18 94 %   07/12/18 1055 - - - - 82 17 94 %   07/12/18 1046 - - - - 83 22 94 %   07/12/18 1045 103/66 - - - 83 16 93 %   07/12/18 1034 112/75 97.4  F (36.3  C) Oral 100 - 20 (!) 88 %     Physical Exam  General: Alert, appears chronically ill, cachetic, short of breath. Cooperative.     In moderate respiratory distress  HEENT:  Head:  Atraumatic  Ears:  External ears are normal  Mouth/Throat:  Oropharynx is without erythema or exudate and mucous membranes are dry.   Eyes:   Conjunctivae normal and EOM are normal. No scleral icterus.  CV:  Tachycardic, regular rhythm, normal heart sounds and radial pulses are 2+ and symmetric.  No murmur.  Resp:  Breath sounds are coarse bilaterally with faint crackles to right base    Labored, no retractions or accessory muscle use  GI:  Abdomen is soft, no distension, no tenderness. No rebound or guarding.  No CVA tenderness bilaterally  MS:  Normal range of motion. No edema.    Normal strength in all 4 extremities.     Back atraumatic.    No midline cervical, thoracic, or lumbar tenderness  Skin:  Warm and dry.  No rash or lesions noted.  Thoracentesis site to right chest wall appears clear, dry, intact.  Neuro: Alert. Normal strength.  GCS: 15  Psych:  Normal mood and affect.    Emergency Department Course   ECG:  Indication: chest pain/SOB  Time: 1035  Vent. Rate 92 bpm. MA interval 136. QRS duration 74. QT/QTc 346/427. P-R-T axis 78 -49 52.   normal sinus rhythm. Possible left atrial enlargement. Left anterior fascicular block. Nonspecific ST abnormality. Abnormal ECG. Read time:  1037.    Imaging:  Radiographic findings were communicated with the patient who voiced understanding of the findings.    Chest XR:  Ill-defined nodules or nodular infiltrates in both lungs  with some apical sparing. Comparison with the topogram and CT scan  from 10/21/2017, suggests significant worsening. Very small right  pleural effusion. No pneumothorax. As per radiology.     CT Chest Pulmonary Embolism with Contrast:  1. No pulmonary embolism is seen. No acute thoracic aortic  abnormality.  2. Progression of pulmonary malignancy with enlargement of several  nodular masses. Many of the nodules are otherwise stable in  appearance.  3. New but small and mildly loculated right pleural fluid.    Laboratory:  CBC: WBC: 11.0, HGB: 15.8(H), PLT: 483(H)  CMP: Glucose 103(H), o/w WNL (Creat 0.84)  Tsh with free T4 reflex: 2.21  Troponin: <0.015  INR: 0.96    Interventions:  1110 Fentanyl 50 mcg IV    Emergency Department Course:  Nursing notes and vitals reviewed. I performed an exam of the patient as documented above.     EKG obtained in the ED, see results above.     The patient was sent for a chest x-ray while here in the emergency department, findings above.    Blood drawn. This was sent to the lab for further testing, results above.    1300 I spoke with Dr. Cesar regarding this patient.    1303 I reassessed the patient.     I spoke with Kerry Mcnair for Dr. Angela regarding this patient.    Findings and plan explained to the Patient who consents to admission. Discussed the patient with Dr. Kerry Mcnair for Dr. Angela, who will admit the patient to a bed for further monitoring, evaluation, and treatment.    Impression & Plan      Medical Decision Making:  Patient is a 58-year-old female with a complex past medical history pertinent for renal cell carcinoma with metastatic lesions diffusely throughout bilateral lungs.  Patient presents with shortness of breath today with initial hypoxia into the mid 80's.  Patient required  2 L of oxygen by nasal cannula.  She is not normally on oxygen at home.  Patient's CT scan negative for pulmonary embolism and other infectious etiologies.  She does have significant malignant disease throughout both lungs.  There is concern for worsening progression of her metastatic renal cell carcinoma.  Patient will require likely long-term oxygen.  I spoke briefly with Dr. Mcconnell with [place] [facility], one of Dr. LOPEZ's partners who stated that she likely would benefit from a palliative conversation and discussion about next steps regarding her metastatic renal cell carcinoma.  Ultimately due to new requirement for oxygen as well as continued pain control patient will be admitted under Dr. Angela.  I spoke with Dr. Angela here in the emergency department who agreed to admission at this time.    Diagnosis:    ICD-10-CM    1. Hx of renal cell carcinoma Z85.528    2. SOB (shortness of breath) R06.02    3. Acute respiratory failure with hypoxia (H) J96.01      Disposition:  Admit      ISofy, am serving as a scribe on 7/12/2018 at 10:28 AM to personally document services performed by Melvin Gonzalez MD based on my observations and the provider's statements to me.     Sofy Villaseñor  7/12/2018   [facility] EMERGENCY DEPARTMENT       Melvin Gonzalez MD  07/12/18 [id number]

## 2018-07-12 NOTE — PLAN OF CARE
Problem: Patient Care Overview  Goal: Plan of Care/Patient Progress Review  Outcome: Improving  PRIMARY DIAGNOSIS:SOB  OUTPATIENT/OBSERVATION GOALS TO BE MET BEFORE DISCHARGE:  1. Dyspnea improved and O2 sats >88% on RA or back to baseline O2 levels: No   SpO2: 92 %, O2 Device: Nasal cannula    2. Tolerating oral abx or appropriate plans made outpatient infusion: NA    3. Vitals signs normal or return to baseline: Yes    4. Short term supplemental O2 needed with activity at home: Unsure at this time. Will need road test    5. Tolerate oral intake to maintain hydration: Yes    6. Return to near baseline physical activity: Yes    Discharge Planner Nurse   Safe discharge environment identified: Yes  Barriers to discharge: No       Entered by: Dasia Lee 07/12/2018 5:35 PM     Please review provider order for any additional goals.   Nurse to notify provider when observation goals have been met and patient is ready for discharge.    Patient is alert and oriented. Patient currently denies SOB. Patient does complain of right chest pain (where her lung is) when she takes deep breaths or coughs. Patient is on 2L o2 via NC to keep o2 sats above 92%. Patient is hungry and ordered some food. Plan is for palliative care consult, and to ween off o2 if possible along with pain control. Will continue to monitor.

## 2018-07-12 NOTE — PHARMACY-ADMISSION MEDICATION HISTORY
Admission medication history interview status for this patient is complete. See EPIC admission navigator for allergy information, prior to admission medications and immunization status.     Medication history interview source(s):Patient  Medication history resources (including written lists, pill bottles, clinic record):Clinic list  Primary pharmacy: [facility]    Changes made to PTA medication list:  Added: See list  Deleted: -  Changed: -    Actions taken by pharmacist (provider contacted, etc):None     Additional medication history information:None    Medication reconciliation/reorder completed by provider prior to medication history? No    Do you take OTC medications (eg tylenol, ibuprofen, fish oil, eye/ear drops, etc)? Y    Prior to Admission medications    Medication Sig Last Dose Taking? Auth Provider   Benzonatate (TESSALON PERLES PO) Take 100 mg by mouth 3 times daily 7/11/2018 at Unknown time Yes Unknown, Entered By History   Cabozantinib S-Malate (CABOMETYX) 40 MG Take 40 mg by mouth daily 7/11/2018 at Unknown time Yes Unknown, Entered By History   calcium carbonate (OS- MG [place]. [place]) 600 MG tablet Take 1,200 mg by mouth daily 7/11/2018 at Unknown time Yes Unknown, Entered By History   guaiFENesin-codeine (ROBITUSSIN AC) 100-10 MG/5ML SOLN solution Take 1 tsp. by mouth At Bedtime 7/11/2018 at pm Yes Unknown, Entered By History   HYDROcodone-acetaminophen (NORCO) 5-325 MG per tablet Take 2 tablets by mouth every 4 hours as needed for pain 7/12/2018 at am Yes Unknown, Entered By History   Lacosamide (VIMPAT) 100 MG TABS tablet Take 100 mg by mouth every morning 7/11/2018 at am Yes Unknown, Entered By History   lacosamide (VIMPAT) 200 MG TABS tablet Take 200 mg by mouth every evening 7/11/2018 at pm Yes Unknown, Entered By History   LamoTRIgine (LAMICTAL) 300 MG TB24 Take 1 tablet by mouth daily 7/11/2018 at am Yes Unknown, Entered By History   levETIRAcetam (KEPPRA) 1000 MG TABS Take 1 tablet by mouth  2 times daily 7/11/2018 at Unknown time Yes Unknown, Entered By History   methimazole (TAPAZOLE) 5 MG tablet Take 5 mg by mouth every evening 7/11/2018 at pm Yes Unknown, Entered By History   morphine (MS CONTIN) 15 MG 12 hr tablet Take 15 mg by mouth every 12 hours 7/12/2018 at am Yes Unknown, Entered By History   Multiple Vitamins-Minerals (MULTIVITAMIN ADULT) TABS Take 1 tablet by mouth daily 7/11/2018 at Unknown time Yes Unknown, Entered By History   zoledronic acid (ZOMETA) 4 MG/100ML SOLN Inject 4 mg into the vein once Due 7/18/18 Yes Unknown, Entered By History         Tapazole dose corrected to 2.5mg every evening

## 2018-07-12 NOTE — IP AVS SNAPSHOT
MRN:[id number]                      After Visit Summary   7/12/2018    Meggan Law    MRN: [id number]           Thank you!     Thank you for choosing [facility] for your care. Our goal is always to provide you with excellent care. Hearing back from our patients is one way we can continue to improve our services. Please take a few minutes to complete the written survey that you may receive in the mail after you visit. If you would like to speak to someone directly about your visit please contact Patient Relations at [phone]. Thank you!          Patient Information     Date Of Birth          1960        About your hospital stay     You were admitted on:  July 12, 2018 You last received care in the:  [facility] Observation Department    You were discharged on:  July 13, 2018        Reason for your hospital stay       Uncontrolled pain /hypoxia after recent thoracentesis for a large pleural effusion & progression in lung metastasis.                  Who to Call     For medical emergencies, please call 911.  For non-urgent questions about your medical care, please call your primary care provider or clinic, [phone]          Attending Provider     Provider Specialty    Melvin Gonzalez MD Emergency Medicine    [facility], Emma French MD Internal Medicine       Primary Care Provider Office Phone # Fax #    Shannon Thomas PA-C [phone]      After Care Instructions     Activity       Your activity upon discharge: activity as tolerated with supplemental O2.            Diet       Follow this diet upon discharge: Orders Placed This Encounter      Regular Diet Adult            Discharge Instructions       Incentive spirometry 4/day            Oxygen Adult       [place] Oxygen Order 2-3 liter(s) by nasal cannula continuously with use of portable tank. Expected treatment length is indefinite (99 months).. Test on conserving device as  applicable.    Patients who qualify for home O2 coverage under the CMS guidelines require ABG tests or O2 sat readings obtained closest to, but no earlier than 2 days prior to the discharge, as evidence of the need for home oxygen therapy. Testing must be performed while patient is in the chronic stable state. See notes for O2 sats.    I certify that this patient, Meggan Law has been under my care and that I, or a nurse practitioner or physician's assistant working with me, had a face-to-face encounter that meets the face-to-face encounter requirements with this patient on 7/13/2018. The patient, Meggan Law was evaluated or treated in whole, or in part, for the following medical condition, which necessitates the use of the ordered oxygen. Treatment Diagnosis: Hypoxia,  Metastasis to lung & possible COPD.    Attending Provider: Emma Angela MD  Physician signature: See electronic signature associated with these discharge orders  Date of Order: July 13, 2018                  Follow-up Appointments     Follow-up and recommended labs and tests        Follow up with primary oncologist Dr. Quiroga from [facility] within 7 days.                             Further instructions from your care team       Oxygen Provider:  Arranged through [facility] [employer] [facility] [employer], contact number [phone].      Pending Results     No orders found for last 3 day(s).            Statement of Approval     Ordered          07/13/18 1140  I have reviewed and agree with all the recommendations and orders detailed in this document.  EFFECTIVE NOW     Approved and electronically signed by:  Emma Angela MD             Admission Information     Date & Time Provider Department Dept. Phone    7/12/2018 Emma Angela MD [facility] Observation Department [phone]      Your Vitals Were     Blood Pressure Pulse Temperature Respirations Height Weight    120/63  "(BP Location: Right arm) 77 98.6  F (37  C) (Oral) 20 1.562 m (5' 1.5\") 39.7 kg (87 lb 8 oz)    Pulse Oximetry BMI (Body Mass Index)                93% 16.27 kg/m2          [employer] Information     [employer] gives you secure access to your electronic health record. If you see a primary care provider, you can also send messages to your care team and make appointments. If you have questions, please call your primary care clinic.  If you do not have a primary care provider, please call [phone] and they will assist you.        Care EveryWhere ID     This is your Care EveryWhere ID. This could be used by other organizations to access your [place] medical records  [id number]        Equal Access to Services     PROSPER STEINER : Abel Murrieta, veronica shetty, myriam galarza, areli fung. So [facility] [phone].    ATENCIÓN: Si habla español, tiene a hinson disposición servicios gratuitos de asistencia lingüística. Llame al [phone].    We comply with applicable federal civil rights laws and [place] laws. We do not discriminate on the basis of race, color, national origin, age, disability, sex, sexual orientation, or gender identity.               Review of your medicines      START taking        Dose / Directions    albuterol 108 (90 Base) MCG/ACT Inhaler   Commonly known as:  PROAIR HFA/PROVENTIL HFA/VENTOLIN HFA   Used for:  SOB (shortness of breath)        Dose:  2 puff   Inhale 2 puffs into the lungs 4 times daily   Quantity:  1 Inhaler   Refills:  1         CONTINUE these medicines which have NOT CHANGED        Dose / Directions    CABOMETYX 40 MG   Generic drug:  Cabozantinib S-Malate        Dose:  40 mg   Take 40 mg by mouth daily   Refills:  0       calcium carbonate 600 MG tablet   Commonly known as:  OS- mg [place]. Ca        Dose:  1200 mg   Take 1,200 mg by mouth daily   Refills:  0       guaiFENesin-codeine 100-10 MG/5ML Soln solution   Commonly " known as:  ROBITUSSIN AC        Dose:  1 tsp.   Take 1 tsp. by mouth At Bedtime   Refills:  0       HYDROcodone-acetaminophen 5-325 MG per tablet   Commonly known as:  NORCO        Dose:  2 tablet   Take 2 tablets by mouth every 4 hours as needed for pain   Refills:  0       KEPPRA 1000 MG Tabs   Generic drug:  levETIRAcetam        Dose:  1 tablet   Take 1 tablet by mouth 2 times daily   Refills:  0       LamoTRIgine 300 MG Tb24   Commonly known as:  LaMICtal        Dose:  1 tablet   Take 1 tablet by mouth daily   Refills:  0       MS CONTIN 15 MG 12 hr tablet   Generic drug:  morphine        Dose:  15 mg   Take 15 mg by mouth every 12 hours   Refills:  0       MULTIVITAMIN ADULT Tabs        Dose:  1 tablet   Take 1 tablet by mouth daily   Refills:  0       TAPAZOLE PO        Dose:  2.5 mg   Take 2.5 mg by mouth every evening   Refills:  0       TESSALON PERLES PO        Dose:  100 mg   Take 100 mg by mouth 3 times daily   Refills:  0       * VIMPAT 100 MG Tabs tablet   Generic drug:  Lacosamide        Dose:  100 mg   Take 100 mg by mouth every morning   Refills:  0       * VIMPAT 200 MG Tabs tablet   Generic drug:  lacosamide        Dose:  200 mg   Take 200 mg by mouth every evening   Refills:  0       zoledronic acid 4 MG/100ML Soln   Commonly known as:  ZOMETA        Dose:  4 mg   Inject 4 mg into the vein once   Refills:  0       * Notice:  This list has 2 medication(s) that are the same as other medications prescribed for you. Read the directions carefully, and ask your doctor or other care provider to review them with you.         Where to get your medicines      These medications were sent to [facility] [id number] [facility] [place] [address]     Phone:  [phone]     albuterol 108 (90 Base) MCG/ACT Inhaler                Protect others around you: Learn how to safely use, store and throw away your medicines at www.disposemymeds.org.         Information about OPIOIDS     PRESCRIPTION OPIOIDS: WHAT YOU NEED TO KNOW   We gave you an opioid (narcotic) pain medicine. It is important to manage your pain, but opioids are not always the best choice. You should first try all the other options your care team gave you. Take this medicine for as short a time (and as few doses) as possible.     These medicines have risks:    DO NOT drive when on new or higher doses of pain medicine. These medicines can affect your alertness and reaction times, and you could be arrested for driving under the influence (DUI). If you need to use opioids long-term, talk to your care team about driving.    DO NOT operate heave machinery    DO NOT do any other dangerous activities while taking these medicines.     DO NOT drink any alcohol while taking these medicines.      If the opioid prescribed includes acetaminophen, DO NOT take with any other medicines that contain acetaminophen. Read all labels carefully. Look for the word  acetaminophen  or  Tylenol.  Ask your pharmacist if you have questions or are unsure.    You can get addicted to pain medicines, especially if you have a history of addiction (chemical, alcohol or substance dependence). Talk to your care team about ways to reduce this risk.    Store your pills in a secure place, locked if possible. We will not replace any lost or stolen medicine. If you don t finish your medicine, please throw away (dispose) as directed by your pharmacist. The [place] Pollution Control Agency has more information about safe disposal: https://www.pca.[facility].[place]/living-green/managing-unwanted-medications.     All opioids tend to cause constipation. Drink plenty of water and eat foods that have a lot of fiber, such as fruits, vegetables, prune juice, apple juice and high-fiber cereal. Take a laxative (Miralax, milk of magnesia, Colace, Senna) if you don t move your bowels at least every other day.              Medication List: This is a list  of all your medications and when to take them. Check marks below indicate your daily home schedule. Keep this list as a reference.      Medications           Morning Afternoon Evening Bedtime As Needed    albuterol 108 (90 Base) MCG/ACT Inhaler   Commonly known as:  PROAIR HFA/PROVENTIL HFA/VENTOLIN HFA   Inhale 2 puffs into the lungs 4 times daily                                CABOMETYX 40 MG   Take 40 mg by mouth daily   Last time this was given:  40 mg on 7/12/2018 10:26 PM   Generic drug:  Cabozantinib S-Malate                                calcium carbonate 600 MG tablet   Commonly known as:  OS- mg [place]. Ca   Take 1,200 mg by mouth daily                                guaiFENesin-codeine 100-10 MG/5ML Soln solution   Commonly known as:  ROBITUSSIN AC   Take 1 tsp. by mouth At Bedtime   Last time this was given:  5 mLs on 7/12/2018  9:41 PM                                HYDROcodone-acetaminophen 5-325 MG per tablet   Commonly known as:  NORCO   Take 2 tablets by mouth every 4 hours as needed for pain                                KEPPRA 1000 MG Tabs   Take 1 tablet by mouth 2 times daily   Last time this was given:  1,000 mg on 7/13/2018  8:59 AM   Generic drug:  levETIRAcetam                                LamoTRIgine 300 MG Tb24   Commonly known as:  LaMICtal   Take 1 tablet by mouth daily   Last time this was given:  300 mg on 7/13/2018  8:58 AM                                MS CONTIN 15 MG 12 hr tablet   Take 15 mg by mouth every 12 hours   Last time this was given:  15 mg on 7/13/2018  8:59 AM   Generic drug:  morphine                                MULTIVITAMIN ADULT Tabs   Take 1 tablet by mouth daily   Last time this was given:  1 tablet on 7/13/2018  8:58 AM                                TAPAZOLE PO   Take 2.5 mg by mouth every evening                                TESSALON PERLES PO   Take 100 mg by mouth 3 times daily   Last time this was given:  100 mg on 7/13/2018  8:58 AM                                 * VIMPAT 100 MG Tabs tablet   Take 100 mg by mouth every morning   Last time this was given:  100 mg on 7/13/2018  8:59 AM   Generic drug:  Lacosamide                                * VIMPAT 200 MG Tabs tablet   Take 200 mg by mouth every evening   Last time this was given:  100 mg on 7/13/2018  8:59 AM   Generic drug:  lacosamide                                zoledronic acid 4 MG/100ML Soln   Commonly known as:  ZOMETA   Inject 4 mg into the vein once                                * Notice:  This list has 2 medication(s) that are the same as other medications prescribed for you. Read the directions carefully, and ask your doctor or other care provider to review them with you.              More Information        Using an Incentive Spirometer    An incentive spirometer is a device that helps you do deep breathing exercises. These exercises expand your lungs, aid in circulation, and help prevent pneumonia. Deep breathing exercises also help you breathe better and improve the function of your lungs by:    Keeping your lungs clear    Strengthening your breathing muscles    Helping prevent respiratory complications or problems  The incentive spirometer gives you a way to take an active part in your recovery. A nurse or therapist will teach you breathing exercises. To do these exercises, you will breathe in through your mouth and not your nose. The incentive spirometer only works correctly if you breathe in through your mouth.  Steps to clear lungs  Step 1. Exhale normally. Then, inhale normally.    Relax and breathe out.  Step 2. Place your lips tightly around the mouthpiece.    Make sure the device is upright and not tilted.  Step 3. Inhale as much air as you can through the mouthpiece (don't breathe through your nose).    Inhale slowly and deeply.    Hold your breath long enough to keep the balls or disk raised for at least 3 to 5 seconds, or as instructed by your healthcare provider.    Some  spirometers have an indicator to let you know that you are breathing in too fast. If the indicator goes off, breathe in more slowly.  Step 4. Repeat the exercise regularly.    Do this exercise every hour while you're awake, or as instructed by your healthcare provider.    If you were taught deep breathing and coughing exercises, do them regularly as instructed by your healthcare provider.   Follow-up care  Make a follow up appointment, or as directed. Also, follow up with your healthcare provider as advised or if your symptoms don't improve or continue to get worse.  When to call your healthcare provider  Call your healthcare provider right away if you have any of the following:    Fever 100.4  (38 C) or higher, or as directed by your healthcare provider    Brownish, bloody, or smelly sputum (phlegm that you cough up)  Call 911  Call 911 if any of these occur:     Shortness of breath that doesn't get better after taking your medicine    Cool, moist, pale or blue skin    Trouble breathing or swallowing, wheezing    Fainting or loss of consciousness    Feeling of dizziness or weakness, or a sudden drop in blood pressure    Feeling very ill    Lightheadedness    Chest pain or rapid heart rate  Date Last Reviewed: 1/1/2017 2000-2017 [employer]. [address] [place] [address], [place] [address]. All rights reserved. This information is not intended as a substitute for professional medical care. Always follow your healthcare professional's instructions.

## 2018-07-13 NOTE — PLAN OF CARE
Problem: Patient Care Overview  Goal: Plan of Care/Patient Progress Review  Outcome: Adequate for Discharge Date Met: 07/13/18  Patient's After Visit Summary was reviewed with patient and/or spouse.   Patient verbalized understanding of After Visit Summary, recommended follow up and was given an opportunity to ask questions.   Discharge medications sent home with patient/family: YES, sent home with new albuterol inhaler and chemo meds from the lock box were sent home as well.  Discharged with spouse, in private transportation to home.    Before leaving, we went over teaching on the proper use of an inhaler and smoking cessation.    OBSERVATION patient END time: 1400

## 2018-07-13 NOTE — PROGRESS NOTES
Ambulated w/ pt in hallway.  Pt is independent.  Without O2 Pt's Sats were 84% to 85%.  I then put Pt back on 2L of 02 during walk, and her O2 went up to 88% w/ heart rate of low 100s.  Back in the room at rest w/ 2L of O2, Pt's O2 was up to 90% to 91%.

## 2018-07-13 NOTE — DISCHARGE SUMMARY
[facility]    Discharge Summary  Hospitalist    Date of Admission:  7/12/2018  Date of Discharge:  7/13/2018  Discharging Provider: Emma Angela MD    Discharge Diagnoses   1. Acute hypoxia  2. Acute R lower chest pain   3. Chronic pain  4. Metastatic renal cell cancer with progression of pulmonary metastasis  5. S/P recent Thoracentesis for 1,380 cc's  6. Subacute bilateral rib fractures  7. Ongoing Tobacco use    Other Diagnosis:  1. History of seizures  2. History of nontoxic multinodular goiter    Chief Complaint: Shortness of Breath    History of Present Illness   Meggan Law is a 58 year old female with a history of metastatic renal cell carcinoma who presents to the Emergency Department for evaluation of persistent shortness of breath and chest discomfort following thoracentesis yesterday. The patient had a right thoracentesis performed yesterday where 1,380 cc's were drained and she required 3 of Fentanyl before pain somewhat subsided and she was able to going home. This morning the patient woke up with worsening shortness of breath and chest pain with O2 SATs around 85%. She is not chronically on oxygen at home. She is not chronically on oxygen at home. The patient was given Morphine and Hydrocodone this morning which did not alleviate her symptoms. She denies any fevers, vomiting, leg swelling or abdominal pain.    Hospital Course   Meggan Law was admitted on 7/12/2018.  The following problems were addressed during her hospitalization:  Ms. Meggan Law is a delightful, 58-year-old female with a known history of metastatic renal cell cancer on cabozantinib under the care of [facility] with progressive pulmonary metastasis and recent development of symptomatic right pleural effusion, status post thoracentesis done yesterday and presenting today to the hospital with right-sided pleuritic chest pain, shortness of breath and hypoxia.  I did speak to   Kimber from [facility], who feels that the patient will be continuing with her chemotherapy, and so, we will have the patient follow up with him as an outpatient.  I do not see any need for an Oncology consultation during this observation visit.      Acute hypoxia:   Likely due to recent thoracentesis, with underling metastatic renal cell cancer with a slight progression of pulmonary metastasis,subacute bilateral rib fractures & possible COPD(smoker).   Workup (labs , CT chest) in the hospital was negative for pneumothorax, PE or acute infection).   - s/p recent thoracentesis  on 07/11/2018 for a large ( 1,380 cc's )pleural effusion.Results not available.  - this AM pt continues to need supplemental O2(sat at rest w/o -84-86% , 88% with 2 lit while walking.  - plan d/c today to home, with O2 .Will arrange for supplemental O2 continuously  2- 3 lit .  - encourage IS & start Albuterol inhaler 2 puffs 4/day  - advised to quit smoking     Acute R lower chest pain, with chronic pain:  Likely due to recent procedure(thoracentesis). Despite current pain medication. Maintained at home on MS Contin 15 mg twice a day in addition to her Norco 1-2 pills every 4 hourly.  - patient did have some relief with the fentanyl that she received yesterday post-thoracentesis and also in the emergency room today.  - Palliative consult for pain management and also plan of care.   - this AM pt comfortable     Metastatic renal cell cancer with slight progression of pulmonary metastasis:  Followed by Dr. Quiroga from [facility], who recommends continuing the current treatment and have her follow up with their office after discharge.      History of seizures:   stable.  - continue PTA Keppra, Lamictal and Vimpat.      History of nontoxic multinodular goiter:    Continue [id number] methimazole.  Normal TSH.       Ongoing tobacco use:  Encouraged to quit smoking.       Code status: DNR/DNI.       Emma  Manolo Angela MD    Significant Results and Procedures   none    Pending Results   None pending  Unresulted Labs Ordered in the Past 30 Days of this Admission     No orders found for last 61 day(s).          Code Status   DNR / DNI       Primary Care Physician   Shannon Thomas    Physical Exam   Temp: 98.4  F (36.9  C) Temp src: Oral BP: 102/59 Pulse: 77 Heart Rate: 80 Resp: 20 SpO2: 91 % O2 Device: Nasal cannula Oxygen Delivery: 2 LPM  Vitals:    07/12/18 1740   Weight: 39.7 kg (87 lb 8 oz)     Vital Signs with Ranges  Temp:  [98.1  F (36.7  C)-98.8  F (37.1  C)] 98.4  F (36.9  C)  Pulse:  [76-77] 77  Heart Rate:  [68-83] 80  Resp:  [14-25] 20  BP: ()/(54-66) 102/59  SpO2:  [90 %-98 %] 91 %  I/O last 3 completed shifts:  In: 180 [P.O.:180]  Out: -     Examination:  Thinly well- moderately nourished. Comfortable with supplemental O2.  Heart: Regular rhythm. S1S2, no murmurs, rubs,gallops  Lungs: Clear to auscultation. And no rhonchi rales or wheezing heard.  Abdomen: Soft and nontender. Bowel sounds present.  Extremities: No swelling.  Neuro:A,A,Ox3, motor sensory grossly intact    Discharge Disposition   Discharged to home  Condition at discharge: Stable    Consultations This Hospital Stay   SOCIAL WORK IP CONSULT  PALLIATIVE CARE ADULT IP CONSULT    Time Spent on this Encounter   IEmma MD, personally saw the patient today and spent greater than 30 minutes discharging this patient.    Discharge Orders     Reason for your hospital stay   Uncontrolled pain /hypoxia after recent thoracentesis for a large pleural effusion & progression in lung metastasis.     Follow-up and recommended labs and tests    Follow up with primary oncologist Dr. Quiroga from [facility] within 7 days.     Activity   Your activity upon discharge: activity as tolerated with supplemental O2.     Discharge Instructions   Incentive spirometry 4/day     DNR/DNI     Oxygen Adult   [facility]  Order 2-3 liter(s) by nasal cannula continuously with use of portable tank. Expected treatment length is indefinite (99 months).. Test on conserving device as applicable.    Patients who qualify for home O2 coverage under the CMS guidelines require ABG tests or O2 sat readings obtained closest to, but no earlier than 2 days prior to the discharge, as evidence of the need for home oxygen therapy. Testing must be performed while patient is in the chronic stable state. See notes for O2 sats.    I certify that this patient, Meggan Law has been under my care and that I, or a nurse practitioner or physician's assistant working with me, had a face-to-face encounter that meets the face-to-face encounter requirements with this patient on 7/13/2018. The patient, Meggan Law was evaluated or treated in whole, or in part, for the following medical condition, which necessitates the use of the ordered oxygen. Treatment Diagnosis: Hypoxia,  Metastasis to lung & possible COPD.    Attending Provider: Emma Angela MD  Physician signature: See electronic signature associated with these discharge orders  Date of Order: July 13, 2018     Diet   Follow this diet upon discharge: Orders Placed This Encounter     Regular Diet Adult       Discharge Medications   Current Discharge Medication List      START taking these medications    Details   albuterol (PROAIR HFA/PROVENTIL HFA/VENTOLIN HFA) 108 (90 Base) MCG/ACT Inhaler Inhale 2 puffs into the lungs 4 times daily  Qty: 1 Inhaler, Refills: 1    Associated Diagnoses: Hypoxia; SOB (shortness of breath)         CONTINUE these medications which have NOT CHANGED    Details   Benzonatate (TESSALON PERLES PO) Take 100 mg by mouth 3 times daily      Cabozantinib S-Malate (CABOMETYX) 40 MG Take 40 mg by mouth daily      calcium carbonate (OS- MG [place]) 600 MG tablet Take 1,200 mg by mouth daily      guaiFENesin-codeine (ROBITUSSIN AC) 100-10 MG/5ML SOLN solution  Take 1 tsp. by mouth At Bedtime      HYDROcodone-acetaminophen (NORCO) 5-325 MG per tablet Take 2 tablets by mouth every 4 hours as needed for pain      !! Lacosamide (VIMPAT) 100 MG TABS tablet Take 100 mg by mouth every morning      !! lacosamide (VIMPAT) 200 MG TABS tablet Take 200 mg by mouth every evening      LamoTRIgine (LAMICTAL) 300 MG TB24 Take 1 tablet by mouth daily      levETIRAcetam (KEPPRA) 1000 MG TABS Take 1 tablet by mouth 2 times daily      MethIMAzole (TAPAZOLE PO) Take 2.5 mg by mouth every evening      morphine (MS CONTIN) 15 MG 12 hr tablet Take 15 mg by mouth every 12 hours      Multiple Vitamins-Minerals (MULTIVITAMIN ADULT) TABS Take 1 tablet by mouth daily      zoledronic acid (ZOMETA) 4 MG/100ML SOLN Inject 4 mg into the vein once       !! - Potential duplicate medications found. Please discuss with provider.        Allergies   No Known Allergies  Data   Most Recent 3 CBC's:  Recent Labs   Lab Test  07/12/18 1047  03/12/18   0005  10/21/17   2000   WBC  11.0  12.9*  8.0   HGB  15.8*  12.6  13.0   MCV  94  88  95   PLT  483*  482*  455*      Most Recent 3 BMP's:  Recent Labs   Lab Test  07/12/18 1047  03/12/18   0005  10/21/17   2000   NA  136  132*  139   POTASSIUM  3.8  3.8  3.9   CHLORIDE  100  98  105   CO2  24  28  28   BUN  13  16  10   CR  0.84  0.64  0.71   ANIONGAP  12  6  6   GIO  9.0  8.7  9.4   GLC  103*  88  90     Most Recent 2 LFT's:  Recent Labs   Lab Test  07/12/18 1047  03/12/18   0005   AST  22  22   ALT  32  24   ALKPHOS  89  83   BILITOTAL  0.7  0.4     Most Recent INR's and Anticoagulation Dosing History:  Anticoagulation Dose History     Recent Dosing and Labs Latest Ref Rng & Units 7/12/2018    INR 0.86 - 1.14 0.96        Most Recent 3 Troponin's:  Recent Labs   Lab Test  07/12/18   1047  10/21/17   2000   TROPI  <0.015  <0.015     Most Recent Cholesterol Panel:No lab results found.  Most Recent 6 Bacteria Isolates From Any Culture (See EPIC Reports for  Culture Details):  Recent Labs   Lab Test  03/12/18   0125  03/12/18   0027  03/12/18   0005   CULT  No growth  No growth  No growth     Most Recent TSH, T4 and A1c Labs:  Recent Labs   Lab Test  07/12/18   1047   TSH  2.21     Results for orders placed or performed during the hospital encounter of 07/12/18   XR Chest 2 Views    Narrative    CHEST TWO VIEWS  7/12/2018 11:04 AM    HISTORY:  Hypoxia, shortness of breath.  Right thoracentesis yesterday  with 1.8 L taken off.  Evaluate for recurrent pleural effusion versus  pneumothorax.     COMPARISON:  Chest CT 10/21/2017.      Impression    IMPRESSION:  Ill-defined nodules or nodular infiltrates in both lungs  with some apical sparing. Comparison with the topogram and CT scan  from 10/21/2017, suggests significant worsening. Very small right  pleural effusion. No pneumothorax.    SONYA JOHNSON MD   CT Chest Pulmonary Embolism w Contrast    Narrative    CT CHEST PULMONARY EMBOLISM WITH CONTRAST  7/12/2018 12:31 PM    HISTORY:  Acute worsening shortness of breath in 24 hours.  Concern  for PE in setting of metastatic lung disease and recent thoracentesis  yesterday.     TECHNIQUE: Scans obtained from the apices through the diaphragm with  IV contrast. 47 mL Isovue-370 injected. Radiation dose for this scan  was reduced using automated exposure control, adjustment of the mA  and/or kV according to patient size, or iterative reconstruction  technique.    COMPARISON:  CT chest, abdomen, and pelvis 3/12/2018.    FINDINGS:    Mediastinum: No acute thoracic aortic abnormality is seen. No evidence  for pulmonary embolism identified. Right paratracheal enlarged lymph  node is 1.6 x 0.8 cm, stable, series 4 image 52. Subcarinal enlarged  lymph node is 2.3 x 0.9 cm, previously 2.3 x 0.6 cm, series 4 image  62. AP window lymph nodes appear grossly stable.    Lungs: Disseminated bilateral pulmonary metastatic disease again  identified. There are several larger nodules and masses.  An example at  the posterior right lower lobe is 3.9 x 3.1 cm, previously 2.1 x 2.6  cm, series 5 image 86. Adjacent nodular masses also appear larger.  Larger lateral left upper lobe nodule is 1.1 cm, previously 0.7 cm,  series 5 image 54. Other nodules appears stable in size.    Additional findings: Small right pleural fluid, some loculated newly  identified. Upper abdomen images show no acute abnormality. Previously  noted left-sided renal mass is difficult to visualize on this  examination. Subacute bilateral rib fractures.      Impression    IMPRESSION:  1. No pulmonary embolism is seen. No acute thoracic aortic  abnormality.  2. Progression of pulmonary malignancy with enlargement of several  nodular masses. Many of the nodules are otherwise stable in  appearance.  3. New but small and mildly loculated right pleural fluid.    KOBY HERNANDEZ MD

## 2018-07-13 NOTE — PLAN OF CARE
Problem: Patient Care Overview  Goal: Plan of Care/Patient Progress Review  Outcome: No Change  Problem: Patient Care Overview  Goal: Plan of Care/Patient Progress Review  Outcome: Improving  PRIMARY DIAGNOSIS: SOB  OUTPATIENT/OBSERVATION GOALS TO BE MET BEFORE DISCHARGE:  1. Dyspnea improved and O2 sats 90% on 2 L via NC. Baseline O2 levels: No O2 use at home  2. Tolerating oral abx or appropriate plans made outpatient infusion: NA     3. Vitals signs normal or return to baseline: Yes     4. Short term supplemental O2 needed with activity at home: Not sure at this time     5. Tolerate oral intake to maintain hydration: Yes     6. Return to near baseline physical activity: No     Discharge Planner Nurse   Safe discharge environment identified: Yes  Barriers to discharge: No Needs supplemental oxygen         Please review provider order for any additional goals.   Nurse to notify provider when observation goals have been met and patient is ready for discharge.     Patient is alert and oriented x 4, VSS denies SOB, denies pain.Up SBA due to feeling weak at times. Has intermittent non productive cough. On 2L oxygen via NC to keep O2 sats above 90%. Plan is for palliative care consult, and to wean off oxygen.  Will continue to monitor.

## 2018-07-13 NOTE — PLAN OF CARE
Problem: Patient Care Overview  Goal: Plan of Care/Patient Progress Review  Outcome: No Change  Problem: Patient Care Overview  Goal: Plan of Care/Patient Progress Review  Outcome: Improving  PRIMARY DIAGNOSIS: SOB  OUTPATIENT/OBSERVATION GOALS TO BE MET BEFORE DISCHARGE:  1. Dyspnea improved and O2 sats 90% on 2 L via NC. Baseline O2 levels: No O2 use at home  2. Tolerating oral abx or appropriate plans made outpatient infusion: NA     3. Vitals signs normal or return to baseline: Yes     4. Short term supplemental O2 needed with activity at home: Not sure at this time     5. Tolerate oral intake to maintain hydration: Yes     6. Return to near baseline physical activity: No     Discharge Planner Nurse   Safe discharge environment identified: Yes  Barriers to discharge: No Needs supplemental oxygen         Please review provider order for any additional goals.   Nurse to notify provider when observation goals have been met and patient is ready for discharge.     Patient is alert and oriented x 4, VSS denies SOB, denies pain.Up SBA due to feeling weak at times. Has intermittent non productive cough. On 2L oxygen via NC to keep O2 sats above 90%. Plan is for palliative care consult, and to ween off oxygen.  Will continue to monitor.

## 2018-07-13 NOTE — PLAN OF CARE
Problem: Patient Care Overview  Goal: Plan of Care/Patient Progress Review  Outcome: Improving  PRIMARY DIAGNOSIS: HYPOXIA  OUTPATIENT/OBSERVATION GOALS TO BE MET BEFORE DISCHARGE:  1. Dyspnea improved and O2 sats >88% on RA or back to baseline O2 levels: No   SpO2: 91 %, O2 Device: Nasal cannula    2. Vitals signs normal or return to baseline: O2 sats are still low when off of nasal canula 2L, it is possible the pt will now be sent home with home oxygen    3. Short term supplemental O2 needed with activity at home: It is possible the pt will require constant home O2    4. Tolerate oral intake to maintain hydration: Yes    5. Return to near baseline physical activity: Yes    Pt reports pain only when she breaths deeply and a cough is triggered. Deep breaths were encouraged as tolerated to help open alveoli in the lungs. Pt on 2L of O2. Lives with . On chemo precautions. Plan: road test to see how oxygen sats are when while walking, encourage deep breathing as tolerated    Discharge Planner Nurse   Safe discharge environment identified: Yes  Barriers to discharge: No       Entered by: Carmina Lovett 07/13/2018 11:48 AM     Please review provider order for any additional goals.   Nurse to notify provider when observation goals have been met and patient is ready for discharge.

## 2018-07-13 NOTE — PLAN OF CARE
Problem: Patient Care Overview  Goal: Plan of Care/Patient Progress Review  Outcome: No Change  Problem: Patient Care Overview  Goal: Plan of Care/Patient Progress Review  Outcome: Improving  PRIMARY DIAGNOSIS:SOB  OUTPATIENT/OBSERVATION GOALS TO BE MET BEFORE DISCHARGE:  1. Dyspnea improved and O2 sats >88% on RA or back to baseline O2 levels: No   SpO2: 90-92 %, O2 Device: Nasal cannula 2L      2. Tolerating oral abx or appropriate plans made outpatient infusion: NA     3. Vitals signs normal or return to baseline: Yes     4. Short term supplemental O2 needed with activity at home: Unsure at this time. Will need road test     5. Tolerate oral intake to maintain hydration: Yes     6. Return to near baseline physical activity: Yes     Discharge Planner Nurse   Safe discharge environment identified: Yes  Barriers to discharge: No       Entered by: Callie Lisa 7/12/2018   Please review provider order for any additional goals.   Nurse to notify provider when observation goals have been met and patient is ready for discharge.     Patient is alert and oriented. Patient currently denies SOB. Patient does complain of right chest pain (where her lung is) when she takes deep breaths or coughs. Patient is on 2L o2 via NC to keep o2 sats above 92%. Plan is for palliative care consult, and to ween off o2 if possible along with pain control. Will continue to monitor.  Robitussin with codeine given.   Up SBA due to feeling weak at times.

## 2018-07-13 NOTE — PROGRESS NOTES
12.00pm- Received order from oxygen team for pt.   12.15pm- I called the care  Coordinator Carmina and I spoke to petros Trevizo to confirm that all the paperwork is ready to go and to speak to the patient. I was transferred to the patient's room and I offered choice of a POC and patient is ok with [facility] treating her. I told her someone would come in to the room and explain how the equipment works. Patient stated that she wants her  to present as the equipment is being presented so if we could giver her 45 minutes as I offered to be there in half an hour and I said that should be fine.  12.30pm- I called for Joseline again as the oxygen readings were in a range and it has to be a number. PETROS Catalan answered and said Joseline was at lunch but she will be able to take care of that.

## 2018-07-13 NOTE — PROGRESS NOTES
Patient has been assessed for Home Oxygen needs.  Oxygen readings:   *   RA - at rest  Pulse oximetry SPO2 86 %  *   RA - during activity/with exercise SPO2 84 %  *   O2 at  2 liters/minute (at rest) ...SPO2 90 %  *   O2 at  2 liters/minute (during activity/with exercise) ...SPO2 88 %

## 2018-08-11 NOTE — PROGRESS NOTES
Patient tolerated Thoracentesis well, no shortness of breath noted, skin warm & dry and color pink. Removed 1500 ml of tea colored fluid from right side. Insertion site clean & dry, dressing applied to site and no bleeding noted at this time. Vital signs stable. Patient returned to [facility].

## 2018-08-11 NOTE — ED AVS SNAPSHOT
[facility] Emergency Department    [address]    [facility] [address]    Phone:  [phone]    Fax:  [phone]                                       Meggan Law   MRN: [id number]    Department:  [facility] Department   Date of Visit:  8/11/2018           After Visit Summary Signature Page     I have received my discharge instructions, and my questions have been answered. I have discussed any challenges I see with this plan with the nurse or doctor.    ..........................................................................................................................................  Patient/Patient Representative Signature      ..........................................................................................................................................  Patient Representative Print Name and Relationship to Patient    ..................................................               ................................................  Date                                            Time    ..........................................................................................................................................  Reviewed by Signature/Title    ...................................................              ..............................................  Date                                                            Time

## 2018-08-11 NOTE — PROCEDURES
RADIOLOGY PROCEDURE NOTE  Patient name: Meggan Law  MRN: [id number]  : 1960    Pre-procedure diagnosis: right pleural effusion  Post-procedure diagnosis: Same    Procedure Date/Time: 2018  5:24 PM  Procedure: right thoracentesis  Estimated blood loss: None  Specimen(s) collected with description: 1500mL pleural fluid  The patient tolerated the procedure well with no immediate complications.  Significant findings:large right pleural effusion    See imaging dictation for procedural details.    Provider name: Grabiel Aj  Assistant(s):None

## 2018-08-11 NOTE — ED PROVIDER NOTES
I received s/o on this patient from Dr. Burnett. Please see her H&P for full specifics.  The patient went down for a right-sided thoracentesis.  I was asked to order a follow-up x-ray and if normal discharge the patient.  The patient is continued to feel well.    Constitutional: Patient appears well-developed and well-nourished. There is no acute distress.   Head: No external signs of trauma noted.  Eyes: Pupils are equal, round, and reactive to light.   Neck: No JVD noted  Cardiovascular: Normal rate, regular rhythm and normal heart sounds.  Pulmonary/Chest: Effort normal and breath sounds normal. No respiratory distress. Patient has no wheezes. Patient has no rales.     Radiology:    Chest XR,  PA & LAT   Preliminary Result   IMPRESSION: No pneumothorax after right thoracentesis. Small right   pleural effusion. Numerous bilateral pulmonary nodular masses again   identified.      XR Chest 2 Views   Final Result   IMPRESSION: There is opacification of the right lung base concerning   for a moderate to large right effusion. Diffuse bilateral nodular   infiltrates are again seen, concerning for metastases. No   pneumothorax.      SHA MEYER MD      US Thoracentesis    (Results Pending)     ED Course:  1824: I evaluated the patient. She feels pretty good. She does note some right sided discomfort with deep breathing.  2014: I re-evaluated the patient. I informed her of her results. She feels well and is comfortable with discharge.       MDM:  This 58-year-old female patient was signed out to me by the outgoing ED physician.  I was asked to follow-up on a chest x-ray.  The patient's x-ray does not show pneumothorax.  There is no obvious rib fracture noted either.  The patient was asking for other pain control options were nonsedating.  She already has prescriptions for long-acting morphine as well as as needed Norco.  We discussed trying something perhaps like Motrin or naproxen and she was interested in the proximal  and given its more convenient dosing schedule.  I will send her home with a prescription for that but encourage outpatient follow-up.  Anticipatory guidance given prior to discharge.    Impression:    ICD-10-CM    1. Pleural effusion J90    2. Metastatic cancer (H) C79.9      Discharge Meds:  New Prescriptions    NAPROXEN (NAPROSYN) 500 MG TABLET    Take 1 tablet (500 mg) by mouth 2 times daily (with meals) for 10 days              Mook Pierre DO  08/11/18 2023

## 2018-08-11 NOTE — ED AVS SNAPSHOT
[facility] Emergency Department    [address]    [facility] [address]    Phone:  [phone]    Fax:  [phone]                                       Meggan Law   MRN: [id number]    Department:  [facility] Emergency Department   Date of Visit:  8/11/2018           Patient Information     Date Of Birth          1960        Your diagnoses for this visit were:     Pleural effusion     Metastatic cancer (H)        You were seen by Agustina Burnett MD and Mook Pierre DO.      Follow-up Information     Follow up with Shannon Thomas PA-C. Call in 2 days.    Specialty:  Family Practice    Why:  As needed    Contact information:    [facility]  [address]  [facility] [address]  [phone]          Follow up with [facility] Emergency Department.    Specialty:  EMERGENCY MEDICINE    Why:  If symptoms worsen    Contact information:    [address]  [facility] [id number] [phone]        Discharge Instructions         Pleural Effusion     Pleural effusion is fluid buildup between the layers of tissue that line the outside of the lungs.   Your healthcare provider has told you that you have pleural effusion. Pleural effusion means that you have extra fluid between the pleura. This area is called the pleural space. The pleura are 2 layers of thin, smooth tissue that surround the lungs and line the chest. The pleural space usually holds only a small amount of fluid. This fluid lubricates the pleura. But if too much fluid fills the space, it can make it hard or painful to breathe.  There are 2 types of pleural effusion:    Inflammatory. This is caused by a lung disease like pneumonia or lung cancer, both of which irritates the pleura.    Noninflammatory. This is caused by abnormal fluid pressures inside the lungs. The pressure can be caused by congestive heart failure (CHF). In CHF, extra fluid  collects inside the lung tissues because of a weak heart muscle. This extra fluid then leaks into the pleural space. Other causes of noninflammatory pleural effusions include kidney disease, liver disease, and malnutrition.  What are the symptoms of pleural effusion?  The symptoms of pleural effusion include:    Sharp pains in the chest, especially when taking a breath, coughing, or sneezing    Trouble breathing    Cough    Fever  What are the causes of pleural effusion?  Common causes include:    Congestive heart failure    Cirrhosis of the liver    Pneumonia    Viral lung infection    Cancer    Blood clot in the lung (pulmonary embolism)    Heart surgery  Chest infections like pneumonia and heart disease are the most common causes. Less common causes include lung cancer.  How is pleural effusion diagnosed?  Your healthcare provider will examine you and ask about your health history. Tests include:    Blood tests    Analysis of fluid in pleural space, chest X-ray, CT scan, or ultrasound  How is pleural effusion treated?  The extra fluid may be drained from the pleural space. This is done with a procedure called thoracentesis. This procedure uses a thin needle to draw out the fluid from the pleural space. In some cases, a tube is placed in the chest to drain the extra fluid. The tube will likely stay in place for several days.  You may have other treatments, depending on the cause of your pleural effusion. If it s because of a bacterial infection, you will be given antibiotics to fight the infection. If it s because of a heart condition, you will be given medicines and other treatment for your heart. Your healthcare provider can tell you more about the cause of your pleural effusion and your treatment choices.  What are the long-term concerns?  If untreated, pleural effusion can lead to serious health problems, such as collapsed lung from fluid filling the pleural space.  Call 911  Call 911 if you have:    Trouble  breathing    Worsening chest pain   When to call your healthcare provider   Call your healthcare provider right away if you have:    Continued coughing    Fever  Date Last Reviewed: 12/1/2016 2000-2017 [employer]. [address] [place] [address], [place] [address]. All rights reserved. This information is not intended as a substitute for professional medical care. Always follow your healthcare professional's instructions.          24 Hour Appointment Hotline       To make an appointment at any [facility], call [phone] ([phone]). If you don't have a family doctor or clinic, we will help you find one. [place] clinics are conveniently located to serve the needs of you and your family.             Review of your medicines      START taking        Dose / Directions Last dose taken    naproxen 500 MG tablet   Commonly known as:  NAPROSYN   Dose:  500 mg   Quantity:  20 tablet        Take 1 tablet (500 mg) by mouth 2 times daily (with meals) for 10 days   Refills:  0          Our records show that you are taking the medicines listed below. If these are incorrect, please call your family doctor or clinic.        Dose / Directions Last dose taken    albuterol 108 (90 Base) MCG/ACT inhaler   Commonly known as:  PROAIR HFA/PROVENTIL HFA/VENTOLIN HFA   Dose:  2 puff   Quantity:  1 Inhaler        Inhale 2 puffs into the lungs 4 times daily   Refills:  1        CABOMETYX 40 MG   Dose:  40 mg   Generic drug:  Cabozantinib S-Malate        Take 40 mg by mouth daily   Refills:  0        calcium carbonate 600 MG tablet   Commonly known as:  OS- mg [place]. Ca   Dose:  1200 mg        Take 1,200 mg by mouth daily   Refills:  0        guaiFENesin-codeine 100-10 MG/5ML Soln solution   Commonly known as:  ROBITUSSIN AC   Dose:  1 tsp.        Take 1 tsp. by mouth At Bedtime   Refills:  0        HYDROcodone-acetaminophen 5-325 MG per tablet   Commonly known as:  NORCO   Dose:  2 tablet        Take 2 tablets  by mouth every 4 hours as needed for pain   Refills:  0        KEPPRA 1000 MG Tabs   Dose:  1 tablet   Generic drug:  levETIRAcetam        Take 1 tablet by mouth 2 times daily   Refills:  0        LamoTRIgine 300 MG Tb24   Commonly known as:  LaMICtal   Dose:  1 tablet        Take 1 tablet by mouth daily   Refills:  0        MS CONTIN 15 MG 12 hr tablet   Dose:  15 mg   Generic drug:  morphine        Take 15 mg by mouth every 12 hours   Refills:  0        MULTIVITAMIN ADULT Tabs   Dose:  1 tablet        Take 1 tablet by mouth daily   Refills:  0        RANITIDINE HCL PO   Dose:  150 mg        Take 150 mg by mouth 2 times daily   Refills:  0        TAPAZOLE PO   Dose:  2.5 mg        Take 2.5 mg by mouth every evening   Refills:  0        TESSALON PERLES PO   Dose:  100 mg        Take 100 mg by mouth 3 times daily   Refills:  0        * VIMPAT 100 MG Tabs tablet   Dose:  100 mg   Generic drug:  Lacosamide        Take 100 mg by mouth every morning   Refills:  0        * VIMPAT 200 MG Tabs tablet   Dose:  200 mg   Generic drug:  lacosamide        Take 200 mg by mouth every evening   Refills:  0        zoledronic acid 4 MG/100ML Soln   Commonly known as:  ZOMETA   Dose:  4 mg        Inject 4 mg into the vein once   Refills:  0        * Notice:  This list has 2 medication(s) that are the same as other medications prescribed for you. Read the directions carefully, and ask your doctor or other care provider to review them with you.            Prescriptions were sent or printed at these locations (1 Prescription)                   Other Prescriptions                Printed at Department/Unit printer (1 of 1)         naproxen (NAPROSYN) 500 MG tablet                Procedures and tests performed during your visit     Basic metabolic panel    CBC with platelets differential    Chest XR,  PA & LAT    EKG 12 lead    EKG 12-lead, tracing only    INR    Troponin I    US Thoracentesis    XR Chest 2 Views      Orders Needing  Specimen Collection     None      Pending Results     Date and Time Order Name Status Description    8/11/2018 1822 Chest XR,  PA & LAT Preliminary     8/11/2018 1542 US Thoracentesis In process     8/11/2018 1440 EKG 12 lead Preliminary             Pending Culture Results     No orders found from 8/9/2018 to 8/12/2018.            Pending Results Instructions     If you had any lab results that were not finalized at the time of your Discharge, you can call the ED Lab Result RN at [phone]. You will be contacted by this team for any positive Lab results or changes in treatment. The nurses are available 7 days a week from 10A to 6:30P.  You can leave a message 24 hours per day and they will return your call.        Test Results From Your Hospital Stay        8/11/2018  3:14 PM      Component Results     Component Value Ref Range & Units Status    WBC 9.0 4.0 - 11.0 10e9/L Final    RBC Count 4.80 3.8 - 5.2 10e12/L Final    Hemoglobin 15.2 11.7 - 15.7 g/dL Final    Hematocrit 45.8 35.0 - 47.0 % Final    MCV 95 78 - 100 fl Final    MCH 31.7 26.5 - 33.0 pg Final    MCHC 33.2 31.5 - 36.5 g/dL Final    RDW 12.8 10.0 - 15.0 % Final    Platelet Count 437 150 - 450 10e9/L Final    Diff Method Automated Method  Final    % Neutrophils 69.4 % Final    % Lymphocytes 22.2 % Final    % Monocytes 6.5 % Final    % Eosinophils 1.3 % Final    % Basophils 0.4 % Final    % Immature Granulocytes 0.2 % Final    Nucleated RBCs 0 0 /100 Final    Absolute Neutrophil 6.3 1.6 - 8.3 10e9/L Final    Absolute Lymphocytes 2.0 0.8 - 5.3 10e9/L Final    Absolute Monocytes 0.6 0.0 - 1.3 10e9/L Final    Absolute Eosinophils 0.1 0.0 - 0.7 10e9/L Final    Absolute Basophils 0.0 0.0 - 0.2 10e9/L Final    Abs Immature Granulocytes 0.0 0 - 0.4 10e9/L Final    Absolute Nucleated RBC 0.0  Final         8/11/2018  3:37 PM      Component Results     Component Value Ref Range & Units Status    Sodium 138 133 - 144 mmol/L Final    Potassium 4.0 3.4 - 5.3  mmol/L Final    Chloride 100 94 - 109 mmol/L Final    Carbon Dioxide 31 20 - 32 mmol/L Final    Anion Gap 7 3 - 14 mmol/L Final    Glucose 85 70 - 99 mg/dL Final    Urea Nitrogen 10 7 - 30 mg/dL Final    Creatinine 0.62 0.52 - 1.04 mg/dL Final    GFR Estimate >90 >60 mL/min/1.7m2 Final    Non  GFR Calc    GFR Estimate If Black >90 >60 mL/min/1.7m2 Final    African American GFR Calc    Calcium 9.1 8.5 - 10.1 mg/dL Final         8/11/2018  3:35 PM      Component Results     Component Value Ref Range & Units Status    INR 0.86 0.86 - 1.14 Final         8/11/2018  3:37 PM      Component Results     Component Value Ref Range & Units Status    Troponin I ES <0.015 0.000 - 0.045 ug/L Final    The 99th percentile for upper reference range is 0.045 ug/L.  Troponin values   in the range of 0.045 - 0.120 ug/L may be associated with risks of adverse   clinical events.           8/11/2018  3:26 PM      Narrative     XR CHEST 2 VW 8/11/2018 3:20 PM    HISTORY: Shortness of breath.    COMPARISON: July 12, 2018.        Impression     IMPRESSION: There is opacification of the right lung base concerning  for a moderate to large right effusion. Diffuse bilateral nodular  infiltrates are again seen, concerning for metastases. No  pneumothorax.    SHA MEYER MD         8/11/2018  4:53 PM      Result not yet available     Exam Ended         8/11/2018  7:41 PM      Narrative     CHEST TWO VIEW   8/11/2018 7:32 PM     HISTORY: Post procedure.     COMPARISON: Chest x-ray 8/11/2018.        Impression     IMPRESSION: No pneumothorax after right thoracentesis. Small right  pleural effusion. Numerous bilateral pulmonary nodular masses again  identified.                Clinical Quality Measure: Blood Pressure Screening     Your blood pressure was checked while you were in the emergency department today. The last reading we obtained was  BP: 116/84 . Please read the guidelines below about what these numbers mean and what you  should do about them.  If your systolic blood pressure (the top number) is less than 120 and your diastolic blood pressure (the bottom number) is less than 80, then your blood pressure is normal. There is nothing more that you need to do about it.  If your systolic blood pressure (the top number) is 120-139 or your diastolic blood pressure (the bottom number) is 80-89, your blood pressure may be higher than it should be. You should have your blood pressure rechecked within a year by a primary care provider.  If your systolic blood pressure (the top number) is 140 or greater or your diastolic blood pressure (the bottom number) is 90 or greater, you may have high blood pressure. High blood pressure is treatable, but if left untreated over time it can put you at risk for heart attack, stroke, or kidney failure. You should have your blood pressure rechecked by a primary care provider within the next 4 weeks.  If your provider in the emergency department today gave you specific instructions to follow-up with your doctor or provider even sooner than that, you should follow that instruction and not wait for up to 4 weeks for your follow-up visit.        Thank you for choosing [facility]       Thank you for choosing [facility] for your care. Our goal is always to provide you with excellent care. Hearing back from our patients is one way we can continue to improve our services. Please take a few minutes to complete the written survey that you may receive in the mail after you visit with us. Thank you!        [facility] Information     [employer] gives you secure access to your electronic health record. If you see a primary care provider, you can also send messages to your care team and make appointments. If you have questions, please call your primary care clinic.  If you do not have a primary care provider, please call [phone] and they will assist you.        Care EveryWhere ID     This is your Care EveryWhere ID. This could be  used by other organizations to access your [place] medical records  [id number]        Equal Access to Services     PROSPER STEINER : Hadii merary [place], veronica shetty, areli liriano. So [facility] [phone].    ATENCIÓN: Si habla español, tiene a hinson disposición servicios gratuitos de asistencia lingüística. Llame al [phone].    We comply with applicable federal civil rights laws and [place] laws. We do not discriminate on the basis of race, color, national origin, age, disability, sex, sexual orientation, or gender identity.            After Visit Summary       This is your record. Keep this with you and show to your community pharmacist(s) and doctor(s) at your next visit.

## 2018-08-11 NOTE — ED PROVIDER NOTES
"  History     Chief Complaint:  Shortness of Breath    HPI   Meggan Law is a 58 year old female who presents with shortness of breath. The patient stated that she has been having fluid in her lungs which started late June that she has needed multiple lung aspirations for, the most recent one being August 2nd. A few days ago, she started to have chest pain that was unique, along with the shortness of breath, prompting her to visit the ED today as she stated, \"this chest pain is unusual\". The patient also stated she has had a cough ever since needing these aspirations, but claims the cough is worse than normal, and is dry, unlike the past, raising concern. The patient did share that she raised her oxygen from 2.5 to 3.0, which has helped relieve her symptoms to some extent.     Allergies:  No Known Drug Allergies    Medications:    Albuterol  Benzonatate   Calcium carbonate  Robitussin  Norco  Vimpat  Lamictal  Keppra  Tapazole  MS Contin  Zometa    Past Medical History:    Cancer  Seizures    Past Surgical History:    History reviewed. No pertinent past surgical history.    Family History:    The patient denies any relevant family medical history.    Social History:  Marital Status:   Smoking Status: Former  Alcohol Use: No    Review of Systems   Constitutional: Negative for fever.   HENT: Negative for congestion.    Respiratory: Positive for cough, chest tightness and shortness of breath.    Cardiovascular: Positive for chest pain.   Neurological: Positive for light-headedness.   All other systems reviewed and are negative.      Physical Exam     Patient Vitals for the past 24 hrs:   BP Temp Temp src Pulse Heart Rate Resp SpO2 Height Weight   08/11/18 1530 116/78 - - - 79 - 90 % - -   08/11/18 1500 - - - - 94 - 96 % - -   08/11/18 1445 117/88 - - - 95 - 93 % - -   08/11/18 1440 138/86 98.5  F (36.9  C) Temporal 93 93 22 93 % 1.549 m (5' 1\") 39.9 kg (88 lb)       Physical Exam    Nursing note and " vitals reviewed.    Constitutional: Pleasant and well groomed. Cachectic         HENT:    Mouth/Throat: Oropharynx is without swelling or erythema. Oral mucosa moist.    Eyes: Conjunctivae are normal. No scleral icterus.    Neck: Neck supple.   Cardiovascular: Normal rate, regular rhythm and intact distal pulses.    Pulmonary/Chest: Effort normal. Decreased breath sounds on the right.  Abdominal: Soft.  No distension. There is no tenderness.   Musculoskeletal:  No edema, No calf tenderness  Neurological:Alert and oriented. Coordination normal.   Skin: Skin is warm and dry.   Psychiatric: Normal mood and affect.     Emergency Department Course   ECG:  ECG taken at 1508, ECG read at 1536  Normal sinus rhythm  Possible left atrial enlargement  Borderline ECG  Rate 88 bpm. FL interval 134. QRS duration 78. QT/QTc 348/421. P-R-T axes 66 18 36.    Imaging:  Radiographic findings were communicated with the patient who voiced understanding of the findings.    XR Chest 2 Views:   There is opacification of the right lung base concerning  for a moderate to large right effusion. Diffuse bilateral nodular  infiltrates are again seen, concerning for metastases. No  pneumothorax.  Per Radiologist.     Laboratory:  CBC: WNL. (WBC 9.0, HGB 15.2, )   BMP: AW NL. (Creatinine 0.62)  INR: 0.86  Troponin 1: <0.015    Interventions:  Medications   lidocaine 1 % 1 mL (not administered)   lidocaine (LMX4) kit (not administered)   sodium chloride (PF) 0.9% PF flush 3 mL (not administered)   sodium chloride (PF) 0.9% PF flush 3 mL (not administered)   ipratropium - albuterol 0.5 mg/2.5 mg/3 mL (DUONEB) neb solution 3 mL (not administered)   0.9% sodium chloride BOLUS (not administered)       Emergency Department Course:  Past medical records, nursing notes, and vitals reviewed.  1452: I performed an exam of the patient and obtained history, as documented above.  1539: Spoke with Dr. Aj (IR) regarding patient    Blood drawn. This was  sent to the lab for further testing, results above.  EKG obtained in the ED, see results above.   The patient was taken for chest XR, see imaging results above.       Impression & Plan      Medical Decision Making:  Ms. Law presents with right-sided chest pain and shortness of breath in the setting of having metastatic cancer and recurrent pleural effusions.  She is hemodynamically stable afebrile and did have decreased breath sounds on the right.  Lab review lab evaluation and EKG are as noted above.  Chest x-ray reveals a large right-sided effusion.  I consulted with Dr. Aj from interventional radiology who is presenting to the hospital to perform pleurocentesis.  She is currently there at this time.  Dr. Pierre is accepted ongoing care of the patient upon her return to evaluate her for likely discharge home.    Diagnosis:    ICD-10-CM    1. Pleural effusion J90    2. Metastatic cancer (H) C79.9        Disposition:  Refer to addendum        Roger Bush  8/11/2018   [facility] EMERGENCY DEPARTMENT    I, Roger Bush, am serving as a scribe at 2:52 PM on 8/11/2018 to document services personally performed by Agustina Burnett* based on my observations and the provider's statements to me.        Agustina Burnett MD  08/11/18 [id number]

## 2018-08-11 NOTE — ED TRIAGE NOTES
Presents with having a hard time breathing, fast/hard beating heart, tightness in the chest, feeling fatigued and needing to take more naps. Started on proair on the 11th of July which initially was helpful, but is no longer finding it helpful.

## 2018-08-12 NOTE — DISCHARGE INSTRUCTIONS
Pleural Effusion     Pleural effusion is fluid buildup between the layers of tissue that line the outside of the lungs.   Your healthcare provider has told you that you have pleural effusion. Pleural effusion means that you have extra fluid between the pleura. This area is called the pleural space. The pleura are 2 layers of thin, smooth tissue that surround the lungs and line the chest. The pleural space usually holds only a small amount of fluid. This fluid lubricates the pleura. But if too much fluid fills the space, it can make it hard or painful to breathe.  There are 2 types of pleural effusion:    Inflammatory. This is caused by a lung disease like pneumonia or lung cancer, both of which irritates the pleura.    Noninflammatory. This is caused by abnormal fluid pressures inside the lungs. The pressure can be caused by congestive heart failure (CHF). In CHF, extra fluid collects inside the lung tissues because of a weak heart muscle. This extra fluid then leaks into the pleural space. Other causes of noninflammatory pleural effusions include kidney disease, liver disease, and malnutrition.  What are the symptoms of pleural effusion?  The symptoms of pleural effusion include:    Sharp pains in the chest, especially when taking a breath, coughing, or sneezing    Trouble breathing    Cough    Fever  What are the causes of pleural effusion?  Common causes include:    Congestive heart failure    Cirrhosis of the liver    Pneumonia    Viral lung infection    Cancer    Blood clot in the lung (pulmonary embolism)    Heart surgery  Chest infections like pneumonia and heart disease are the most common causes. Less common causes include lung cancer.  How is pleural effusion diagnosed?  Your healthcare provider will examine you and ask about your health history. Tests include:    Blood tests    Analysis of fluid in pleural space, chest X-ray, CT scan, or ultrasound  How is pleural effusion treated?  The extra fluid may  be drained from the pleural space. This is done with a procedure called thoracentesis. This procedure uses a thin needle to draw out the fluid from the pleural space. In some cases, a tube is placed in the chest to drain the extra fluid. The tube will likely stay in place for several days.  You may have other treatments, depending on the cause of your pleural effusion. If it s because of a bacterial infection, you will be given antibiotics to fight the infection. If it s because of a heart condition, you will be given medicines and other treatment for your heart. Your healthcare provider can tell you more about the cause of your pleural effusion and your treatment choices.  What are the long-term concerns?  If untreated, pleural effusion can lead to serious health problems, such as collapsed lung from fluid filling the pleural space.  Call 911  Call 911 if you have:    Trouble breathing    Worsening chest pain   When to call your healthcare provider   Call your healthcare provider right away if you have:    Continued coughing    Fever  Date Last Reviewed: 12/1/2016 2000-2017 [employer]. [address] [place] [address]. All rights reserved. This information is not intended as a substitute for professional medical care. Always follow your healthcare professional's instructions.

## 2018-08-17 NOTE — PROGRESS NOTES
US guided right thoracentesis. Consent obtained, time out taken. Right lung drained of 1600 clear ciara fluid. Pt experienced a lot of coughing and felt distressed. Flow rate slowed and stopped to allow pt to tolerate. With rigorous coughing catheter had moved out some and drain stopped due to this. Pt has hoped to drain all fluid, but she is aware there is some fluid remaining. VSS, sats 91-98% on 2.5Lnc. Returned to care suites. No post CXR ordered. Taking PO, continues to have occasional cough. Feeling slightly light headed, and weak. VSS will stay a little longer till feeling better. Report to Janneth SPEARS

## 2018-08-17 NOTE — PROGRESS NOTES
Care Suites Discharge Summary    Discharge Criteria:   Discharge Criteria met per MD orders: Yes.   Vital signs stable.     Pt demonstrates ability to ambulate safely: Yes.  (See discharge questionnaire for additional information)    Discharge instructions & education:   Discharge instructions reviewed with patient by Jessica Bentley RN. Patient verbalizes understanding.    Medications:   Patient will be discharging on new medications- No. Patient verbalizes reason for use, start date, and side effects NA.    Items returned to patient:   Home and hospital acquired medications returned to patient NA   Listed belongings gathered and returned to patient: Yes    Patient discharged to home via wheelchair with spouse.    Janneth Rey

## 2018-08-17 NOTE — IP AVS SNAPSHOT
[facility] [address]    [place] [address]    Phone:  [phone]                                       After Visit Summary   8/17/2018    Meggan Law    MRN: [id number]           After Visit Summary Signature Page     I have received my discharge instructions, and my questions have been answered. I have discussed any challenges I see with this plan with the nurse or doctor.    ..........................................................................................................................................  Patient/Patient Representative Signature      ..........................................................................................................................................  Patient Representative Print Name and Relationship to Patient    ..................................................               ................................................  Date                                            Time    ..........................................................................................................................................  Reviewed by Signature/Title    ...................................................              ..............................................  Date                                                            Time

## 2018-08-17 NOTE — PROGRESS NOTES
Pt here for routine thoracentesis. C/o some SOB. On O2. Denies pain. Procedure explained and discharge instructions done with pt and . States understanding.

## 2018-08-17 NOTE — IP AVS SNAPSHOT
MRN:[id number]                      After Visit Summary   8/17/2018    Meggan Law    MRN: [id number]           Visit Information        Department      8/17/2018 12:44 PM [facility]          Review of your medicines      UNREVIEWED medicines. Ask your doctor about these medicines        Dose / Directions    albuterol 108 (90 Base) MCG/ACT inhaler   Commonly known as:  PROAIR HFA/PROVENTIL HFA/VENTOLIN HFA   Used for:  Hypoxia, SOB (shortness of breath)        Dose:  2 puff   Inhale 2 puffs into the lungs 4 times daily   Quantity:  1 Inhaler   Refills:  1       CABOMETYX 40 MG   Generic drug:  Cabozantinib S-Malate        Dose:  40 mg   Take 40 mg by mouth daily   Refills:  0       calcium carbonate 600 MG tablet   Commonly known as:  OS- mg [place]. Ca        Dose:  1200 mg   Take 1,200 mg by mouth daily   Refills:  0       guaiFENesin-codeine 100-10 MG/5ML Soln solution   Commonly known as:  ROBITUSSIN AC        Dose:  1 tsp.   Take 1 tsp. by mouth At Bedtime   Refills:  0       HYDROcodone-acetaminophen 5-325 MG per tablet   Commonly known as:  NORCO        Dose:  2 tablet   Take 2 tablets by mouth every 4 hours as needed for pain   Refills:  0       KEPPRA 1000 MG Tabs   Generic drug:  levETIRAcetam        Dose:  1 tablet   Take 1 tablet by mouth 2 times daily   Refills:  0       LamoTRIgine 300 MG Tb24   Commonly known as:  LaMICtal        Dose:  1 tablet   Take 1 tablet by mouth daily   Refills:  0       MS CONTIN 15 MG 12 hr tablet   Generic drug:  morphine        Dose:  15 mg   Take 15 mg by mouth every 12 hours   Refills:  0       MULTIVITAMIN ADULT Tabs        Dose:  1 tablet   Take 1 tablet by mouth daily   Refills:  0       RANITIDINE HCL PO        Dose:  150 mg   Take 150 mg by mouth 2 times daily   Refills:  0       TAPAZOLE PO        Dose:  2.5 mg   Take 2.5 mg by mouth every evening   Refills:  0       TESSALON PERLES PO        Dose:  100 mg   Take 100  mg by mouth 3 times daily   Refills:  0       * VIMPAT 100 MG Tabs tablet   Generic drug:  Lacosamide        Dose:  100 mg   Take 100 mg by mouth every morning   Refills:  0       * VIMPAT 200 MG Tabs tablet   Generic drug:  lacosamide        Dose:  200 mg   Take 200 mg by mouth every evening   Refills:  0       zoledronic acid 4 MG/100ML Soln   Commonly known as:  ZOMETA        Dose:  4 mg   Inject 4 mg into the vein once   Refills:  0       * Notice:  This list has 2 medication(s) that are the same as other medications prescribed for you. Read the directions carefully, and ask your doctor or other care provider to review them with you.             Protect others around you: Learn how to safely use, store and throw away your medicines at www.disposemymeds.org.         Follow-ups after your visit        Your next 10 appointments already scheduled     Aug 17, 2018  2:00 PM CDT   US THORACENTESIS with SHUS4, SH IMAGING NURSE, SH BODY RAD   [facility] Ultrasound ([facility])    [address] [place] [address] [phone]           Bring a list of your medicines to the exam. Include vitamins, minerals and over-the-counter drugs.  Tell your doctor in advance:   If you are or may be pregnant.   If you are taking Coumadin (or any other blood thinners) 5 days prior to the exam for any special instructions.   If you are diabetic to determine if your insulin needs have to be adjusted for the exam.  IF YOUR DOCTOR ALSO PRESCRIBED SEDATION DURING THE EXAM (medicine to help you relax): You will receive separate instructions about driving, eating, and additional tests that may be necessary prior to your exam day.  Please call the Imaging Department at your exam site with any questions.            Aug 27, 2018 10:30 AM CDT   US THORACENTESIS with RHUS1, RH IR RAD, RH IMAGING NURSE   [facility] Ultrasound ([facility])    [address]  [facility] [address]    [phone]           Bring a list of your medicines to the exam. Include vitamins, minerals and over-the-counter drugs.  Tell your doctor in advance:   If you are or may be pregnant.   If you are taking Coumadin (or any other blood thinners) 5 days prior to the exam for any special instructions.   If you are diabetic to determine if your insulin needs have to be adjusted for the exam.  IF YOUR DOCTOR ALSO PRESCRIBED SEDATION DURING THE EXAM (medicine to help you relax): You will receive separate instructions about driving, eating, and additional tests that may be necessary prior to your exam day.  Please call the Imaging Department at your exam site with any questions.            Sep 04, 2018  2:00 PM CDT   US THORACENTESIS with RHUS1, RH IR RAD, RH IMAGING NURSE   [facility] Ultrasound ([facility])    [address] [facility] [address] [phone]           Bring a list of your medicines to the exam. Include vitamins, minerals and over-the-counter drugs.  Tell your doctor in advance:   If you are or may be pregnant.   If you are taking Coumadin (or any other blood thinners) 5 days prior to the exam for any special instructions.   If you are diabetic to determine if your insulin needs have to be adjusted for the exam.  IF YOUR DOCTOR ALSO PRESCRIBED SEDATION DURING THE EXAM (medicine to help you relax): You will receive separate instructions about driving, eating, and additional tests that may be necessary prior to your exam day.  Please call the Imaging Department at your exam site with any questions.            Sep 11, 2018 10:00 AM CDT   CT CHEST/ABDOMEN/PELVIS W CONTRAST with RSCCCT1   [facility] [place] ([facility])    [address]  [facility] [id number] [phone]           Please bring any scans or X-rays taken at other hospitals, if similar tests were done. Also bring a list of your medicines, including  vitamins, minerals and over-the-counter drugs. It is safest to leave personal items at home.  Be sure to tell your doctor:   If you have any allergies.   If there s any chance you are pregnant.   If you are breastfeeding.  How to prepare:   Do not eat or drink for 2 hours before your exam. If you need to take medicine, you may take it with small sips of water. (We may ask you to take liquid medicine as well.)   Please wear loose clothing, such as a sweat suit or jogging clothes. Avoid snaps, zippers and other metal. We may ask you to undress and put on a hospital gown.  Please arrive 30 minutes early for your CT. Once in the department you might be asked to drink water 15-20 minutes prior to your exam.  If indicated you may be asked to drink an oral contrast in advance of your CT.  If this is the case, the imaging team will let you know or be in contact with you prior to your appointment  Patients over 70 or patients with diabetes or kidney problems:   If you haven t had a blood test (creatinine test) within the last 30 days, the Cardiologist/Radiologist may require you to get this test prior to your exam.  If you have diabetes:   Continue to take your metformin medication on the day of your exam  If you have any questions, please call the Imaging Department where you will have your exam.               Care Instructions        Further instructions from your care team       Thoracentesis Discharge Instructions     After you go home:      You may resume your normal diet    Care of Puncture Site:      For the first 48 hrs, check your puncture site every couple hours while you are awake     If there is a bandaid - you may remove it tomorrow morning    You may shower tomorrow    No tub baths, whirlpools or swimming until your puncture site has fully healed     Activity:      You may go back to normal activity in 24 hours    Wait 48 hours before lifting, straining, exercise or other strenuous  activity    Medicines:      You may resume all your medications    For minor pain, you may take Acetaminophen (Tylenol) or Ibuprofen (Advil)            Call the provider who ordered this procedure if:      The site is red, swollen, hot or tender    Blood or fluid is draining from the site    You have chills or a fever greater than 101 F (38 C)    Shortness of breath    Pain that is getting worse    Any questions or concerns      Additional Information:      During this test, a needle will sometimes enter the lung. This can cause the lung to collapse - called a pneumothorax. Symptoms include severe chest pain, breathing problems or increased blood in your sputum (phlegm).     Call  911 or go to the Emergency Room if:      Severe chest pain or trouble breathing    Increased blood in your sputum (phlegm)    Bleeding that you cannot control      If you have questions call:        [facility] Radiology Dept @ [phone]               Additional Information About Your Visit        [employer] Information     [facility] gives you secure access to your electronic health record. If you see a primary care provider, you can also send messages to your care team and make appointments. If you have questions, please call your primary care clinic.  If you do not have a primary care provider, please call [phone] and they will assist you.        Care EveryWhere ID     This is your Care EveryWhere ID. This could be used by other organizations to access your [facility] medical records  [id number]        Your Vitals Were     Blood Pressure Temperature Pulse Oximetry             121/68 98  F (36.7  C) (Oral) 97%          Primary Care Provider Office Phone # Fax #    Shannon Thomas PA-C [phone]      Equal Access to Services     [facility] Hospital[facility]ANINE : Abel ajramilloo Sodaniel, waaxda luqadaha, qaybta kaalmada geovanni, areli fung. So [facility] [phone].    ATENCIÓN: Tre schneider,  tiene a hinson disposición servicios gratuitos de asistencia lingüística. Alexis rodriguez [phone].    We comply with applicable federal civil rights laws and [place] laws. We do not discriminate on the basis of race, color, national origin, age, disability, sex, sexual orientation, or gender identity.            Thank you!     Thank you for choosing [facility] for your care. Our goal is always to provide you with excellent care. Hearing back from our patients is one way we can continue to improve our services. Please take a few minutes to complete the written survey that you may receive in the mail after you visit with us. Thank you!             Medication List: This is a list of all your medications and when to take them. Check marks below indicate your daily home schedule. Keep this list as a reference.      Medications           Morning Afternoon Evening Bedtime As Needed    albuterol 108 (90 Base) MCG/ACT inhaler   Commonly known as:  PROAIR HFA/PROVENTIL HFA/VENTOLIN HFA   Inhale 2 puffs into the lungs 4 times daily                                CABOMETYX 40 MG   Take 40 mg by mouth daily   Generic drug:  Cabozantinib S-Malate                                calcium carbonate 600 MG tablet   Commonly known as:  OS- mg [place]. Ca   Take 1,200 mg by mouth daily                                guaiFENesin-codeine 100-10 MG/5ML Soln solution   Commonly known as:  ROBITUSSIN AC   Take 1 tsp. by mouth At Bedtime                                HYDROcodone-acetaminophen 5-325 MG per tablet   Commonly known as:  NORCO   Take 2 tablets by mouth every 4 hours as needed for pain                                KEPPRA 1000 MG Tabs   Take 1 tablet by mouth 2 times daily   Generic drug:  levETIRAcetam                                LamoTRIgine 300 MG Tb24   Commonly known as:  LaMICtal   Take 1 tablet by mouth daily                                MS CONTIN 15 MG 12 hr tablet   Take 15 mg by mouth every 12 hours   Generic drug:   morphine                                MULTIVITAMIN ADULT Tabs   Take 1 tablet by mouth daily                                RANITIDINE HCL PO   Take 150 mg by mouth 2 times daily                                TAPAZOLE PO   Take 2.5 mg by mouth every evening                                TESSALON PERLES PO   Take 100 mg by mouth 3 times daily                                * VIMPAT 100 MG Tabs tablet   Take 100 mg by mouth every morning   Generic drug:  Lacosamide                                * VIMPAT 200 MG Tabs tablet   Take 200 mg by mouth every evening   Generic drug:  lacosamide                                zoledronic acid 4 MG/100ML Soln   Commonly known as:  ZOMETA   Inject 4 mg into the vein once                                * Notice:  This list has 2 medication(s) that are the same as other medications prescribed for you. Read the directions carefully, and ask your doctor or other care provider to review them with you.

## 2018-08-24 NOTE — ED AVS SNAPSHOT
[facility] Emergency Department    [address]    [facility] [address]    Phone:  [phone]    Fax:  [phone]                                       Meggan Law   MRN: [id number]    Department:  [facility] Emergency Department   Date of Visit:  8/24/2018           After Visit Summary Signature Page     I have received my discharge instructions, and my questions have been answered. I have discussed any challenges I see with this plan with the nurse or doctor.    ..........................................................................................................................................  Patient/Patient Representative Signature      ..........................................................................................................................................  Patient Representative Print Name and Relationship to Patient    ..................................................               ................................................  Date                                            Time    ..........................................................................................................................................  Reviewed by Signature/Title    ...................................................              ..............................................  Date                                                            Time          22EPIC Rev 08/18

## 2018-08-24 NOTE — PROGRESS NOTES
Right thoracentesis completed per Dr. Cui for 1300 ml clear blood tinged fluid, patient tolerated poorly and procedure was discontinued per her request. Patient to return on Monday for scheduled out patient thoracentesis appointment.

## 2018-08-24 NOTE — DISCHARGE INSTRUCTIONS
Pleural Effusion    The pleura is a smooth double membrane that surrounds the lungs. It separates the lungs from the chest wall. One side of the pleura attaches to the lung. The other side attaches to the chest wall. This membrane makes it easier for the chest to inflate and deflate as you breathe without rubbing against the ribs. You normally have a small amount of lubricating fluid (pleural fluid) between the pleural membranes.  A pleural effusion is when too much fluid collects in the space between the two pleural membranes (pleural space). As the amount of fluid increases, it begins to press on the lung. This makes it harder to take a full breath.  There are two types of pleural effusion:    Inflammatory. This is caused by a lung disease like pneumonia or lung cancer, which irritates the pleura.    Noninflammatory. This is caused by abnormal fluid pressures inside the lungs. The pressure can be caused by congestive heart failure (CHF). In CHF, extra fluid collects inside the lung tissues because of a weakened heart muscle. This extra fluid then leaks into the pleural space.  Pleural effusion may cause any of these symptoms:    Shortness of breath    Rapid breathing    Cough or hiccups    Sharp chest pain that hurts more with coughing or deep breathing  A small pleural effusion may cause no symptoms at all.  Treatment will be directed at the cause of the pleural effusion. If you are having a lot of trouble breathing, a thoracentesis procedure may be done to remove the fluid from the pleural space. This involves placing a needle or tube (catheter) through the chest wall into the pleural space. This usually gives relief right away. But the fluid may gradually return.  Home care  Follow these guidelines when caring for yourself at home:    Rest until you feel better. Exerting yourself may make your symptoms worse.    Your healthcare provider may have prescribed medicines to treat the underlying cause of the  pleural effusion. Take these exactly as directed.  Follow-up care  Follow up with your healthcare provider, or as advised.  When to seek medical advice  Call your healthcare provider right away if any of these occur:    Fever of 100.4 F (38 C) or higher, or as directed by your healthcare provider  Call 911  Call 911 if any of the following occur:    Shortness of breath gets worse    Chest pain gets worse    Coughing up blood    Weakness, dizziness, or fainting  Date Last Reviewed: 9/13/2015 2000-2017 [employer]. [address] [place] [address]. All rights reserved. This information is not intended as a substitute for professional medical care. Always follow your healthcare professional's instructions.

## 2018-08-24 NOTE — LETTER
08/24/18      To Whom it may concern:    Bharti Goodson was in our Emergency Department today, 08/24/18, with her sister who is diagnosed with terminal Stage IV Renal Cancer.   Her sister has had a change in status, requiring emergent treatment, under the care of Dr. Brian Morales.  Please excuse Ms. Goodson from her scheduled flight.      Sincerely,

## 2018-08-24 NOTE — ED TRIAGE NOTES
History of kidney cancer with mets to the lung.  Here with shortness of breath and needing a thoracentesis. Last one 1 week ago.  Patient alert and oriented x3.  Airway, breathing and circulation intact.

## 2018-08-24 NOTE — ED PROVIDER NOTES
"  History     Chief Complaint:  Shortness of Breath    HPI   Meggan Law is a 58 year old female with history of renal cell carcinoma that metastasized to both lungs and seizures who presents with shortness of breath. The patient reports that she has developed shortness of breath and believes she needs a thoracentesis, prompting her to seek evaluation in the ED today. She states that she had a thoracentesis 1 week ago. She denies having fevers. Of note, her oncologist is Dr. Quiroga.    Allergies:  No known drug allergies    Medications:    Albuterol  Benzonatate  Cabometyx  Vimpat  Lamictal  Keppra   Tapazole  Zometa     Past Medical History:    Renal cell carcinoma  Lung cancer  Seizures  Hypoxia     Past Surgical History:    Appendectomy     Family History:    History reviewed. No pertinent family history.     Social History:  Marital Status:   [2]  Smoking status: Former Smoker, quit 7/11/2018  Alcohol use: No     Review of Systems   Constitutional: Negative for fever.   Respiratory: Positive for shortness of breath.    All other systems reviewed and are negative.    Physical Exam     Patient Vitals for the past 24 hrs:   BP Temp Temp src Heart Rate Resp SpO2 Height Weight   08/24/18 1202 135/79 - - 69 24 98 % - -   08/24/18 1159 - - - - - 97 % - -   08/24/18 1158 135/79 - - - - - - -   08/24/18 1118 - - - - - 97 % - -   08/24/18 1117 123/75 - - - - 94 % - -   08/24/18 0943 (!) 132/93 97.6  F (36.4  C) Oral 74 18 95 % 1.562 m (5' 1.5\") 42 kg (92 lb 8 oz)       Physical Exam   Constitutional: She appears well-developed and well-nourished.   HENT:   Right Ear: External ear normal.   Left Ear: External ear normal.   Mouth/Throat: Oropharynx is clear and moist. No oropharyngeal exudate.   TM's clear bilaterally   Eyes: Conjunctivae are normal. Pupils are equal, round, and reactive to light. No scleral icterus.   Cardiovascular: Normal rate, regular rhythm, normal heart sounds and intact distal " pulses.  Exam reveals no gallop and no friction rub.    No murmur heard.  Pulmonary/Chest: Effort normal. No respiratory distress. She has no wheezes. She has no rales.   Decreased breath sounds on the right   Abdominal: Soft. Bowel sounds are normal. She exhibits no distension and no mass. There is no tenderness.   Musculoskeletal: She exhibits no edema.   Neurological: She is alert.   Skin: Skin is warm and dry. No rash noted.   Psychiatric: She has a normal mood and affect.     Emergency Department Course   Imaging:  Radiographic findings were communicated with the patient who voiced understanding of the findings.    US Thoracentesis:  Thoracentesis performed.  As read by Radiology.    Chest XR, PA & LAT:  Patchy diffuse bilateral pulmonary infiltrates. Small right pleural effusion. Similar findings compared to 8/11/2018.  As read by Radiology.      Emergency Department Course:  Past medical records, nursing notes, and vitals reviewed.  0958: I performed an exam of the patient and obtained history, as documented above.  The patient was sent for a thoracentesis ultrasound and chest x-ray while in the emergency department, findings above.    1130: I rechecked the patient. Explained findings to the patient.    1143: I rechecked the patient. Explained findings to the patient.    1224: I rechecked the patient. Explained findings to the patient.    Findings and plan explained to the patient. Patient discharged home with instructions regarding supportive care, medications, and reasons to return. The importance of close follow-up was reviewed.     Impression & Plan    Medical Decision Making:  Meggan Law is a 58 year old female who presents with needs for a centesis on the right recurrent pleural effusion. Patient does have one scheduled for Monday, but does not feel she can wait that long. She does not feel any different than her previous episodes in which she needed a thoracentesis. I contacted interventional  radiology and they were able to do that today. 1300 CC of fluid was taken out. Patient states that she does not feel better immediately normally after the thoracentesis, and that it usually takes 48 hours for her to return to her normal baseline of breathing. Patient will follow up with her oncologist Dr. Quiroga as needed and will return if she feels the need for thoracentesis again. ED diagnosis: right pleural effusion, chronic.    Diagnosis:    ICD-10-CM   1. Pleural effusion J90       Disposition:  discharged to home      Terri Shaikh  8/24/2018   [facility] EMERGENCY DEPARTMENT  ITerri, am serving as a scribe at 9:58 AM on 8/24/2018 to document services personally performed by Brian Morales MD based on my observations and the provider's statements to me.          Brian Morales MD  08/24/18 8472

## 2018-08-24 NOTE — ED AVS SNAPSHOT
[facility] Emergency Department    [address]    [facility] [address]    Phone:  [phone]    Fax:  [phone]                                       Meggan Law   MRN: [id number]    Department:  [facility] Emergency Department   Date of Visit:  8/24/2018           Patient Information     Date Of Birth          1960        Your diagnoses for this visit were:     Pleural effusion        You were seen by Brian Morales MD.      Follow-up Information     Follow up with Garret Cesar MD.    Specialty:  Hematology & Oncology    Why:  As needed    Contact information:    [facility]  [address]  [facility] [address]  [phone]          Discharge Instructions         Pleural Effusion    The pleura is a smooth double membrane that surrounds the lungs. It separates the lungs from the chest wall. One side of the pleura attaches to the lung. The other side attaches to the chest wall. This membrane makes it easier for the chest to inflate and deflate as you breathe without rubbing against the ribs. You normally have a small amount of lubricating fluid (pleural fluid) between the pleural membranes.  A pleural effusion is when too much fluid collects in the space between the two pleural membranes (pleural space). As the amount of fluid increases, it begins to press on the lung. This makes it harder to take a full breath.  There are two types of pleural effusion:    Inflammatory. This is caused by a lung disease like pneumonia or lung cancer, which irritates the pleura.    Noninflammatory. This is caused by abnormal fluid pressures inside the lungs. The pressure can be caused by congestive heart failure (CHF). In CHF, extra fluid collects inside the lung tissues because of a weakened heart muscle. This extra fluid then leaks into the pleural space.  Pleural effusion may cause any of these symptoms:    Shortness of breath    Rapid breathing    Cough  or hiccups    Sharp chest pain that hurts more with coughing or deep breathing  A small pleural effusion may cause no symptoms at all.  Treatment will be directed at the cause of the pleural effusion. If you are having a lot of trouble breathing, a thoracentesis procedure may be done to remove the fluid from the pleural space. This involves placing a needle or tube (catheter) through the chest wall into the pleural space. This usually gives relief right away. But the fluid may gradually return.  Home care  Follow these guidelines when caring for yourself at home:    Rest until you feel better. Exerting yourself may make your symptoms worse.    Your healthcare provider may have prescribed medicines to treat the underlying cause of the pleural effusion. Take these exactly as directed.  Follow-up care  Follow up with your healthcare provider, or as advised.  When to seek medical advice  Call your healthcare provider right away if any of these occur:    Fever of 100.4 F (38 C) or higher, or as directed by your healthcare provider  Call 911  Call 911 if any of the following occur:    Shortness of breath gets worse    Chest pain gets worse    Coughing up blood    Weakness, dizziness, or fainting  Date Last Reviewed: 9/13/2015 2000-2017 [employer]. [address] [place] [address]. All rights reserved. This information is not intended as a substitute for professional medical care. Always follow your healthcare professional's instructions.          Your next 10 appointments already scheduled     Aug 27, 2018 10:30 AM CDT   US THORACENTESIS with RHUS1, RH IR RAD, RH IMAGING NURSE   [facility] Ultrasound ([facility])    [address] [facility] [address] [phone]           Bring a list of your medicines to the exam. Include vitamins, minerals and over-the-counter drugs.  Tell your doctor in advance:   If you are or may be pregnant.   If you are taking Coumadin (or  any other blood thinners) 5 days prior to the exam for any special instructions.   If you are diabetic to determine if your insulin needs have to be adjusted for the exam.  IF YOUR DOCTOR ALSO PRESCRIBED SEDATION DURING THE EXAM (medicine to help you relax): You will receive separate instructions about driving, eating, and additional tests that may be necessary prior to your exam day.  Please call the Imaging Department at your exam site with any questions.            Sep 04, 2018  2:00 PM CDT   US THORACENTESIS with RHUS1, RH IR RAD, RH IMAGING NURSE   [facility] Ultrasound ([facility])    [address]  [facility] [id number] [phone]           Bring a list of your medicines to the exam. Include vitamins, minerals and over-the-counter drugs.  Tell your doctor in advance:   If you are or may be pregnant.   If you are taking Coumadin (or any other blood thinners) 5 days prior to the exam for any special instructions.   If you are diabetic to determine if your insulin needs have to be adjusted for the exam.  IF YOUR DOCTOR ALSO PRESCRIBED SEDATION DURING THE EXAM (medicine to help you relax): You will receive separate instructions about driving, eating, and additional tests that may be necessary prior to your exam day.  Please call the Imaging Department at your exam site with any questions.            Sep 11, 2018 10:00 AM CDT   CT CHEST/ABDOMEN/PELVIS W CONTRAST with [email or web address]   [facility] ([facility])    [address]  [facility] [id number] [phone]           Please bring any scans or X-rays taken at other hospitals, if similar tests were done. Also bring a list of your medicines, including vitamins, minerals and over-the-counter drugs. It is safest to leave personal items at home.  Be sure to tell your doctor:   If you have any allergies.   If there s any chance you are pregnant.   If you are breastfeeding.  How  to prepare:   Do not eat or drink for 2 hours before your exam. If you need to take medicine, you may take it with small sips of water. (We may ask you to take liquid medicine as well.)   Please wear loose clothing, such as a sweat suit or jogging clothes. Avoid snaps, zippers and other metal. We may ask you to undress and put on a hospital gown.  Please arrive 30 minutes early for your CT. Once in the department you might be asked to drink water 15-20 minutes prior to your exam.  If indicated you may be asked to drink an oral contrast in advance of your CT.  If this is the case, the imaging team will let you know or be in contact with you prior to your appointment  Patients over 70 or patients with diabetes or kidney problems:   If you haven t had a blood test (creatinine test) within the last 30 days, the Cardiologist/Radiologist may require you to get this test prior to your exam.  If you have diabetes:   Continue to take your metformin medication on the day of your exam  If you have any questions, please call the Imaging Department where you will have your exam.              24 Hour Appointment Hotline       To make an appointment at any [facility], call [phone] ([phone]). If you don't have a family doctor or clinic, we will help you find one. [place] clinics are conveniently located to serve the needs of you and your family.             Review of your medicines      Our records show that you are taking the medicines listed below. If these are incorrect, please call your family doctor or clinic.        Dose / Directions Last dose taken    albuterol 108 (90 Base) MCG/ACT inhaler   Commonly known as:  PROAIR HFA/PROVENTIL HFA/VENTOLIN HFA   Dose:  2 puff   Quantity:  1 Inhaler        Inhale 2 puffs into the lungs 4 times daily   Refills:  1        CABOMETYX 40 MG   Dose:  40 mg   Generic drug:  Cabozantinib S-Malate        Take 40 mg by mouth daily   Refills:  0        calcium carbonate 600 MG  tablet   Commonly known as:  OS- mg [place]. Ca   Dose:  1200 mg        Take 1,200 mg by mouth daily   Refills:  0        guaiFENesin-codeine 100-10 MG/5ML Soln solution   Commonly known as:  ROBITUSSIN AC   Dose:  1 tsp.        Take 1 tsp. by mouth At Bedtime   Refills:  0        HYDROcodone-acetaminophen 5-325 MG per tablet   Commonly known as:  NORCO   Dose:  2 tablet        Take 2 tablets by mouth every 4 hours as needed for pain   Refills:  0        KEPPRA 1000 MG Tabs   Dose:  1 tablet   Generic drug:  levETIRAcetam        Take 1 tablet by mouth 2 times daily   Refills:  0        LamoTRIgine 300 MG Tb24   Commonly known as:  LaMICtal   Dose:  1 tablet        Take 1 tablet by mouth daily   Refills:  0        MS CONTIN 15 MG 12 hr tablet   Dose:  15 mg   Generic drug:  morphine        Take 15 mg by mouth every 12 hours   Refills:  0        MULTIVITAMIN ADULT Tabs   Dose:  1 tablet        Take 1 tablet by mouth daily   Refills:  0        RANITIDINE HCL PO   Dose:  150 mg        Take 150 mg by mouth 2 times daily   Refills:  0        TAPAZOLE PO   Dose:  2.5 mg        Take 2.5 mg by mouth every evening   Refills:  0        TESSALON PERLES PO   Dose:  100 mg        Take 100 mg by mouth 3 times daily   Refills:  0        * VIMPAT 100 MG Tabs tablet   Dose:  100 mg   Generic drug:  Lacosamide        Take 100 mg by mouth every morning   Refills:  0        * VIMPAT 200 MG Tabs tablet   Dose:  200 mg   Generic drug:  lacosamide        Take 200 mg by mouth every evening   Refills:  0        zoledronic acid 4 MG/100ML Soln   Commonly known as:  ZOMETA   Dose:  4 mg        Inject 4 mg into the vein once   Refills:  0        * Notice:  This list has 2 medication(s) that are the same as other medications prescribed for you. Read the directions carefully, and ask your doctor or other care provider to review them with you.            Procedures and tests performed during your visit     Chest XR,  PA & LAT    US  Thoracentesis      Orders Needing Specimen Collection     None      Pending Results     No orders found from 8/22/2018 to 8/25/2018.            Pending Culture Results     No orders found from 8/22/2018 to 8/25/2018.            Pending Results Instructions     If you had any lab results that were not finalized at the time of your Discharge, you can call the ED Lab Result RN at [phone]. You will be contacted by this team for any positive Lab results or changes in treatment. The nurses are available 7 days a week from 10A to 6:30P.  You can leave a message 24 hours per day and they will return your call.        Test Results From Your Hospital Stay        8/24/2018 11:39 AM      Narrative     XR CHEST 2 VW 8/24/2018 11:38 AM    HISTORY: sob, R effusion;         Impression     IMPRESSION: Patchy diffuse bilateral pulmonary infiltrates. Small  right pleural effusion. Similar findings compared to 8/11/2018.    HAYDE JEAN MD         8/24/2018 11:51 AM      Narrative     THORACENTESIS 8/24/2018 11:17 AM    HISTORY: Patient with history of pleural effusion.    PROCEDURE/FINDINGS:  After obtaining informed consent, the patient was  positioned appropriately. The back was prepped and draped in the usual  sterile manner. Under ultrasound guidance, a Yueh needle was used to  access the pleural space. An prominent ultrasound image was saved to  document needle position. Thoracentesis was performed. Approximately  1300 mL red serous fluid was aspirated out.     The patient tolerated the procedure well. There were no immediate  postprocedure complications. The patient's vital signs were monitored  by radiology nursing staff under my supervision and remained stable  throughout the study.        Impression     IMPRESSION:  Thoracentesis performed.    HILLARY SHETTY MD                Clinical Quality Measure: Blood Pressure Screening     Your blood pressure was checked while you were in the emergency department today. The  last reading we obtained was  BP: 123/75 . Please read the guidelines below about what these numbers mean and what you should do about them.  If your systolic blood pressure (the top number) is less than 120 and your diastolic blood pressure (the bottom number) is less than 80, then your blood pressure is normal. There is nothing more that you need to do about it.  If your systolic blood pressure (the top number) is 120-139 or your diastolic blood pressure (the bottom number) is 80-89, your blood pressure may be higher than it should be. You should have your blood pressure rechecked within a year by a primary care provider.  If your systolic blood pressure (the top number) is 140 or greater or your diastolic blood pressure (the bottom number) is 90 or greater, you may have high blood pressure. High blood pressure is treatable, but if left untreated over time it can put you at risk for heart attack, stroke, or kidney failure. You should have your blood pressure rechecked by a primary care provider within the next 4 weeks.  If your provider in the emergency department today gave you specific instructions to follow-up with your doctor or provider even sooner than that, you should follow that instruction and not wait for up to 4 weeks for your follow-up visit.        Thank you for choosing [facility]       Thank you for choosing [facility] for your care. Our goal is always to provide you with excellent care. Hearing back from our patients is one way we can continue to improve our services. Please take a few minutes to complete the written survey that you may receive in the mail after you visit with us. Thank you!        [employer] Information     [facility] gives you secure access to your electronic health record. If you see a primary care provider, you can also send messages to your care team and make appointments. If you have questions, please call your primary care clinic.  If you do not have a primary care provider, please  call [phone] and they will assist you.        Care EveryWhere ID     This is your Care EveryWhere ID. This could be used by other organizations to access your [facility] medical records  [id number]        Equal Access to Services     PROSPER STEINER : Abel Murrieta, veronica shetty, qapeterta kaalmaspike galarza, areli fung. So [facility] [phone].    ATENCIÓN: Si habla español, tiene a hinson disposición servicios gratuitos de asistencia lingüística. Llame al [phone].    We comply with applicable federal civil rights laws and [place] laws. We do not discriminate on the basis of race, color, national origin, age, disability, sex, sexual orientation, or gender identity.            After Visit Summary       This is your record. Keep this with you and show to your community pharmacist(s) and doctor(s) at your next visit.

## 2018-08-27 NOTE — PROCEDURES
RADIOLOGY POST PROCEDURE NOTE    Patient name: Meggan Law  MRN: [id number]  : 1960    Pre-procedure diagnosis: Pleural effusion  Post-procedure diagnosis: Same    Procedure Date/Time: 2018  11:08 AM  Procedure: US guided thoracentesis  Estimated blood loss: None  Specimen(s) collected with description: none    The patient tolerated the procedure well with no immediate complications.  Significant findings:none    See imaging dictation for procedural details.    Provider name: El Buckley  Assistant(s):None

## 2018-08-27 NOTE — PROGRESS NOTES
Right thoracentesis consent obtained by Dr. Buckley.  Pt tolerated partial thoracentesis rather poorly with coughing and pain.  Procedure stopped at 900 ml's per order DR. Buckley.  Post CXr completed and reviewed by Dr. Buckley.  Pt DC to home with sister with multiple questions ans concerns addressed.  To return Friday.

## 2018-09-04 NOTE — PROGRESS NOTES
Right thoracentesis completed. Pt tolerated well, vital signs stable. No SOB reported post thora. Obtained consent with Dr. Cui. Time out completed and documented in EMR prior to start of procedure. 1400 mls fluid collected,clear and ciraa in color. Manually drained pt's fluid. Site covered with bandage.  Reviewed education and discharge instructions with pt and sister. Patient stayed 30 minutes post procedure, due to coughing, then discharged to home via wheelchair with sister in stable condition.

## 2018-09-07 NOTE — PROGRESS NOTES
Pt was in Radiology today for right thoracentesis. Procedure performed by  . There were no complications during procedure and vitals remained stable throughout. Pt tolerated procedure well, 1300 cc's of dark ciara fluid was removed from pleural space. Pt was given written and verbal instructions. Pt left department in stable and satisfactory condition with sister.

## 2018-09-07 NOTE — PROGRESS NOTES
Pt was in Radiology today for thoracentesis. Timeout performed, vitals recorded in chart. Procedure performed by  . There were no complications during procedure and vitals remained stable throughout. Pt tolerated procedure well,  cc's of colored fluid was removed from pleural space. Pt was given written and verbal instructions. Pt left department in stable and satisfactory condition.

## 2018-09-07 NOTE — PROGRESS NOTES
Pt Meggan Law was in Radiology today for a procedure accompanied by her sister Bharti Goodson. This pt has active stage 4 kidney cancer with metastasis to lungs. Sister needs to be here for comfort and support.

## 2018-09-07 NOTE — LETTER
[facility] [place] [facility]  [address]  [facility] [address]  Phone: [phone]  Fax: [phone]    September 7, 2018        To whom it may concern:    RE: Bharti Goodson    Ms Goodson was present at [facility] to provide care and assistance for an ailing family member who underwent a medical procedure 9/7/18.  Her family member requires continued care and monitoring at home in the immediate days followig following the procedure.    Please contact me for questions or concerns.      Sincerely,        El Buckley MD

## 2018-09-07 NOTE — PROCEDURES
RADIOLOGY POST PROCEDURE NOTE    Patient name: Meggan Law  MRN: [id number]  : 1960    Pre-procedure diagnosis: renal cancer  Post-procedure diagnosis: Same    Procedure Date/Time: 2018  11:48 AM  Procedure: US guided throacentesis  Estimated blood loss: None  Specimen(s) collected with description: none    The patient tolerated the procedure well with no immediate complications.  Significant findings:none    See imaging dictation for procedural details.    Provider name: El Buckley  Assistant(s):None

## 2018-09-10 NOTE — PROGRESS NOTES
RADIOLOGY PROCEDURE NOTE  Patient name: Meggan Law  MRN: [id number]  : 1960    Pre-procedure diagnosis: Pleural effusion  Post-procedure diagnosis: Same    Procedure Date/Time: September 10, 2018  3:35 PM  Procedure: Right thoracentesis  Estimated blood loss: None  Specimen(s) collected with description: pleural fluid  The patient tolerated the procedure well with no immediate complications.  Significant findings:none    See imaging dictation for procedural details.    Provider name: Usama Melton  Assistant(s):None

## 2018-09-10 NOTE — PROGRESS NOTES
Right thoracentesis completed per Usama DO for 1200 ml clear ciara fluid, patient tolerated well. All discharge criteria were met and patient discharged to home ambulatory with family in stable condition.

## 2018-09-13 NOTE — PROGRESS NOTES
RADIOLOGY PROCEDURE NOTE  Patient name: Meggan Law  MRN: [id number]  : 1960    Pre-procedure diagnosis: Right pleural effusion  Post-procedure diagnosis: Same    Procedure Date/Time: 2018  1:21 PM  Procedure: Right thoracentesis  Estimated blood loss: None  Specimen(s) collected with description: yellow fluid  The patient tolerated the procedure well with no immediate complications.  Significant findings:none    See imaging dictation for procedural details.    Provider name: Troy Mccarthy  Assistant(s):None

## 2018-09-13 NOTE — PROGRESS NOTES
Pt was in Radiology today for RIGHT thoracentesis. Procedure performed by  JESSICA DO. There were no complications during procedure and vitals remained stable throughout. Pt tolerated procedure well, 800 cc's of  Slightly cloudy medium yellow fluid removed from pleural space. Pt asked if she could skip a chest xray post procedure, so Troy looked with US and then was given written and verbal instructions. Pt left department in stable and satisfactory condition with .

## 2018-09-17 NOTE — PROGRESS NOTES
Pt was in Radiology today for right  thoracentesis. Procedure performed by JESSICA DO . There were no complications during procedure and vitals remained stable throughout. Pt tolerated procedure well, 975 cc's of fluid was removed from pleural space.Pt left department in stable and satisfactory condition with .

## 2018-09-17 NOTE — PROGRESS NOTES
RADIOLOGY PROCEDURE NOTE  Patient name: Meggan Law  MRN: [id number]  : 1960    Pre-procedure diagnosis: Pleural effusion  Post-procedure diagnosis: Same    Procedure Date/Time: 2018  2:26 PM  Procedure: Right thoracentesis  Estimated blood loss: None  Specimen(s) collected with description: pleural fluid  The patient tolerated the procedure well with no immediate complications.  Significant findings:none    See imaging dictation for procedural details.    Provider name: Usama Melton  Assistant(s):None

## 2018-09-21 NOTE — PROGRESS NOTES
Right thoracentesis completed. Pt tolerated well, vital signs stable. No SOB reported. Obtained consent with Dr. Cui. Time out completed and documented in EMR prior to start of procedure.1100mls fluid collected, ciara in color.  Site covered with bandage.  Reviewed education and discharge instructions with pt.

## 2018-09-25 NOTE — PROGRESS NOTES
Consent for right thoracentesis obtained by DR. Romero.  Pt has many questions and concerns that were addressed prior to procedure.  Hand aspiration of total 900 ml's yellow fluid well tolerated.  Post CXR ordered and completed.  discontinue care instructions understood by pt and  prior to discontinue to home.  No evident bleeding or complications on discharge.

## 2018-09-25 NOTE — PROCEDURES
RADIOLOGY POST PROCEDURE NOTE    Patient name: Meggan Law  MRN: [id number]  : 1960    Pre-procedure diagnosis: Right recurrent pleural effusion  Post-procedure diagnosis: Same    Procedure Date/Time: 2018  12:43 PM  Procedure: US guided right thoracentesis with removal of 900 ml fluid.  Tolerated well.  Post CXR.  Septations developing within fluid--some concern for trapped lung of RLL.  Consider thoracic surgery consultation for assessment of decortication, pleurodesis.  Patient states NOT interested in tunneled pleurX catheter.  Estimated blood loss: < 5 ml  Specimen(s) collected with description: Right pleural fluid    The patient tolerated the procedure well with no immediate complications.  Significant findings:  Please see above.    See imaging dictation for procedural details.    Provider name: Robb Tuttle  Assistant(s):None

## 2018-09-28 NOTE — PROCEDURES
RADIOLOGY POST PROCEDURE NOTE    Patient name: Meggan Law  MRN: [id number]  : 1960    Pre-procedure diagnosis: Recurrent right pleural effusion.  Post-procedure diagnosis: Same    Procedure Date/Time: 2018  11:49 AM  Procedure: US guided right thoracentesis.  Clear ciara fluid removed.  Tolerated well.  No apparent complication.  Post CXR.  Estimated blood loss: < 5ml  Specimen(s) collected with description: Right pleural fluid    The patient tolerated the procedure well with no immediate complications.  Significant findings:  Please see above.    See imaging dictation for procedural details.    Provider name: Robb Tuttle  Assistant(s):None

## 2018-10-02 NOTE — PROCEDURES
RADIOLOGY POST PROCEDURE NOTE    Patient name: Meggan Law  MRN: [id number]  : 1960    Pre-procedure diagnosis: Pleural effusion  Post-procedure diagnosis: Same    Procedure Date/Time: 2018  2:35 PM  Procedure: US guided thoracentesis  Estimated blood loss: None  Specimen(s) collected with description: none    The patient tolerated the procedure well with no immediate complications.  Significant findings:none    See imaging dictation for procedural details.    Provider name: lE Buckley  Assistant(s):None

## 2018-10-02 NOTE — PROGRESS NOTES
Right thoracentesis completed per Dr. Buckley for 950 ml clear ciara fluid, patient tolerated well. All discharge criteria were met and patient discharged to home ambulatory in stable condition with spouse.

## 2018-10-05 NOTE — PROGRESS NOTES
Right thoracentesis completed per Dr. Tuttle for 800 ml clear ciara fluid, patient tolerated well. All discharge criteria were met and patient discharged to home ambulatory with spouse in stable condition.

## 2018-10-05 NOTE — PROCEDURES
RADIOLOGY POST PROCEDURE NOTE    Patient name: Meggan Law  MRN: [id number]  : 1960    Pre-procedure diagnosis: Right pleural effusion  Post-procedure diagnosis: Same    Procedure Date/Time: 2018  2:26 PM  Procedure: US guided right thoracentesis with removal of fluid.  Effusion is septated and slowly growing more septated.  Estimated blood loss: < 5 ml  Specimen(s) collected with description: Right pleural effusion    The patient tolerated the procedure well with no immediate complications.  Significant findings:  Please see above    See imaging dictation for procedural details.    Provider name: Robb Tuttle  Assistant(s):None

## 2018-10-08 NOTE — PROGRESS NOTES
Right thoracentesis completed per Dr. Tuttle without difficulty for 800 ml clear ciara fluid, patient tolerated well. All discharge criteria were met and patient discharged to home ambulatory with spouse in stable condition.

## 2018-10-12 NOTE — PROGRESS NOTES
Pt was in Radiology today for thoracentesis on right. Procedure performed by Mandeep . There were no complications during procedure and vitals remained stable throughout. Pt tolerated procedure well, 1000 cc's of clear ciara fluid was removed from pleural space. Pt had a chest xray post procedure and then was given written and verbal instructions. Pt left department in stable and satisfactory condition with her sister.

## 2018-10-15 NOTE — PROGRESS NOTES
RADIOLOGY PROCEDURE NOTE  Patient name: Meggan Law  MRN: [id number]  : 1960    Pre-procedure diagnosis: Pleural effusion  Post-procedure diagnosis: Same    Procedure Date/Time: October 15, 2018  10:18 AM  Procedure: Right thoracentesis  Estimated blood loss: None  Specimen(s) collected with description: pleural fluid  The patient tolerated the procedure well with no immediate complications.  Significant findings:none    See imaging dictation for procedural details.    Provider name: Usama Melton  Assistant(s):None

## 2018-10-15 NOTE — PROGRESS NOTES
Right thoracentesis completed per Usama DO without difficulty for 1000 ml clear ciara fluid, patient tolerated well. All discharge criteria were met and patient discharged to home ambulatory with sister in stable condition.

## 2018-10-19 NOTE — PROGRESS NOTES
Right Thoracentesis completed. Pt tolerated well, vital signs stable. No SOB reported. Obtained consent with Dr. Romero. Time out completed and documented in EMR prior to start of procedure. 1200 mls fluid collected ciara in color.  Site covered with bandage.  Reviewed education and discharge instructions with pt.

## 2018-10-19 NOTE — PROCEDURES
RADIOLOGY POST PROCEDURE NOTE    Patient name: Meggan Law  MRN: [id number]  : 1960    Pre-procedure diagnosis: Recurrent right pleural effusion  Post-procedure diagnosis: Same    Procedure Date/Time: 2018  11:17 AM  Procedure: Right thoracentesis with removal of clear yellow fluid.  Septations are visible, though stable in number/extent.  Estimated blood loss: < 5 ml  Specimen(s) collected with description:  Right pleural fluid    The patient tolerated the procedure well with no immediate complications.  Significant findings:  Please see above.    See imaging dictation for procedural details.    Provider name: Robb Tuttle  Assistant(s):None

## 2018-10-22 NOTE — PROGRESS NOTES
Right thoracentesis completed per Dr. Aj without difficulty for 900 ml clear ciara fluid, patient tolerated well. All discharge criteria were met and patient discharged to home ambulatory with family in stable condition.

## 2018-10-26 NOTE — PROCEDURES
RADIOLOGY POST PROCEDURE NOTE    Patient name: Meggan Law  MRN: [id number]  : 1960    Pre-procedure diagnosis: Pleural effusion  Post-procedure diagnosis: Same    Procedure Date/Time: 2018  4:01 PM  Procedure: US guided thoracentesis  Estimated blood loss: None  Specimen(s) collected with description: none    The patient tolerated the procedure well with no immediate complications.  Significant findings:none    See imaging dictation for procedural details.    Provider name: El Buckley  Assistant(s):None

## 2018-10-26 NOTE — PROGRESS NOTES
Pt was in Radiology today for right thoracentesis. Procedure performed by  . There were no complications during procedure and vitals remained stable throughout. Pt tolerated procedure well, 1000 cc's of clear yellow fluid was removed from pleural space. Pt was given written and verbal instructions. Pt left department in stable and satisfactory condition with .

## 2018-10-29 PROBLEM — J96.21 ACUTE ON CHRONIC RESPIRATORY FAILURE WITH HYPOXIA (H): Status: ACTIVE | Noted: 2018-01-01

## 2018-10-29 NOTE — IP AVS SNAPSHOT
[place] [address] [facility]    [address]    [facility] [address]    Phone:  [phone]    Fax:  [phone]                                       After Visit Summary   10/29/2018    Meggan Law    MRN: [id number]           After Visit Summary Signature Page     I have received my discharge instructions, and my questions have been answered. I have discussed any challenges I see with this plan with the nurse or doctor.    ..........................................................................................................................................  Patient/Patient Representative Signature      ..........................................................................................................................................  Patient Representative Print Name and Relationship to Patient    ..................................................               ................................................  Date                                   Time    ..........................................................................................................................................  Reviewed by Signature/Title    ...................................................              ..............................................  Date                                               Time          22EPIC Rev 08/18

## 2018-10-29 NOTE — ED NOTES
[facility]  ED Nurse Handoff Report    Meggan Law is a 58 year old female   ED Chief complaint: Shortness of Breath  . ED Diagnosis:   Final diagnoses:   Hypoxia   Dyspnea, unspecified type     Allergies: No Known Allergies    Code Status: prior  Activity level - Baseline/Home:  Independent. Activity Level - Current:   Stand with Assist. Lift room needed: No. Bariatric: No   Needed: No   Isolation: No. Infection: Not Applicable.     Vital Signs:   Vitals:    10/29/18 1015 10/29/18 1030 10/29/18 1045 10/29/18 1100   BP: 101/63 93/72 100/68 105/69   Pulse:       Resp: 25 (!) 31 28 (!) 31   Temp:       SpO2: 93% 94% 95% 95%   Weight:       Height:           Cardiac Rhythm:  ,      Pain level: 0-10 Pain Scale: 9  Patient confused: No. Patient Falls Risk: Yes.   Elimination Status: Has voided   Patient Report - Initial Complaint: SOB. Focused Assessment: Pt has thoracentesis performed this morning, two band aids applied to posterior right lower lobe.  Pt diminished throughout, more noticeable on right.  Pt usually uses 3 liters oxygen nasal cannula at home, requiring 4.5-5 this morning.  Tests Performed: CT/thoracentesis/labs. Abnormal Results:   Chest CT, IV contrast only - PE protocol   Final Result   IMPRESSION:    1. No pulmonary embolism demonstrated.   2. No thoracic aortic dissection or aneurysm.    3. Increased metastatic disease.   4. Increased partially loculated right pleural effusion.      OLIVER MEJIA MD      XR Chest 1 View   Final Result   IMPRESSION: Improved aeration at the right lung base compared to   earlier today. No pneumothorax. Bilateral ill-defined nodular   opacities are noted.      RUI MIRZA MD      US Thoracentesis   Final Result   IMPRESSION:    1.  Ultrasound-guided thoracentesis, as detailed above.   2.  Total volume of fluid removed from 2 separate accesses, 1300 mL      JOHN OLSEN MD      XR Chest 2 Views   Preliminary Result   IMPRESSION:    1.  Right pleural effusion.   2. Multiple pulmonary nodules.      POC US ECHO LIMITED   Preliminary Result   Limited Bedside Cardiac Ultrasound, performed and interpreted by me.    Indication: Shortness of Breath.   Thoracic views were acquired.    Image quality was satisfactory.      Findings:     Abnormal large right sided hypoechoic areas with septation throughout the right hemithorax      IMPRESSION: Moderate to large right sided pleural effusion with some areas of septation           Labs Ordered and Resulted from Time of ED Arrival Up to the Time of Departure from the ED   CBC WITH PLATELETS DIFFERENTIAL - Abnormal; Notable for the following:        Result Value    Platelet Count 537 (*)     All other components within normal limits   BASIC METABOLIC PANEL - Abnormal; Notable for the following:     Glucose 105 (*)     GFR Estimate 0 (*)     GFR Estimate If Black 0 (*)     All other components within normal limits   ISTAT  GASES LACTATE SKYLA POCT - Abnormal; Notable for the following:     PO2 Venous 23 (*)     Bicarbonate Venous 29 (*)     All other components within normal limits   TROPONIN I   TSH   POTASSIUM     .   Treatments provided:   Nebs, meds per mar  Family Comments:   OBS brochure/video discussed/provided to patient:  No  ED Medications:   Medications   lidocaine (XYLOCAINE) 4 % solution (not administered)   oxymetazoline (AFRIN) 0.05 % spray (not administered)   Lacosamide (VIMPAT) tablet 100 mg (not administered)   lamoTRIgine (LaMICtal) 24 hr tablet 300 mg (not administered)   ipratropium - albuterol 0.5 mg/2.5 mg/3 mL (DUONEB) neb solution 3 mL (3 mLs Nebulization Given 10/29/18 0852)   iopamidol (ISOVUE-370) solution 500 mL (46 mLs Intravenous Given 10/29/18 0912)   0.9% sodium chloride BOLUS (0 mLs Intravenous Stopped 10/29/18 0913)   levETIRAcetam (KEPPRA) tablet 1,000 mg (1,000 mg Oral Given 10/29/18 1115)   ipratropium - albuterol 0.5 mg/2.5 mg/3 mL (DUONEB) neb solution 3 mL (3 mLs Nebulization  Given 10/29/18 1115)     Drips infusing:  No  For the majority of the shift, the patient's behavior Green. Interventions performed were rounding.     Severe Sepsis OR Septic Shock Diagnosis Present: No      ED Nurse Name/Phone Number: Alka Piña,   11:17 AM    RECEIVING UNIT ED HANDOFF REVIEW    Above ED Nurse Handoff Report was reviewed: Yes  Reviewed by: Benita Mata on October 29, 2018 at 12:26 PM   RECEIVING UNIT ED HANDOFF REVIEW    Above ED Nurse Handoff Report was reviewed: yes  Reviewed by: Perico Suero on October 29, 2018 at 1:02 PM

## 2018-10-29 NOTE — ED NOTES
Consent for right thoracentesis obtained by DR. Buckley. 2 sites of right pleural fluid collections tapped by Dr. Buckley for total 1300 ml's ciara fluid.  Coughing but stable post procedure.  Pt transported to xray for post CXR per order then back to ER per cart with  at side.

## 2018-10-29 NOTE — PROCEDURES
RADIOLOGY POST PROCEDURE NOTE    Patient name: Meggan Law  MRN: [id number]  : 1960    Pre-procedure diagnosis: Pleural effusion  Post-procedure diagnosis: Same    Procedure Date/Time: 2018  8:22 AM  Procedure: Ultrasound guided thoracentesis  Estimated blood loss: None  Specimen(s) collected with description: none    The patient tolerated the procedure well with no immediate complications.  Significant findings: Loculated effusion, 2 separate access sites.     See imaging dictation for procedural details.    Provider name: El Buckley  Assistant(s):None

## 2018-10-29 NOTE — CONSULTS
THORACIC SURGERY CONSULT NOTE    Consult Reason: Recurrent malignant pleural effusion    HPI: This is a 58 year old woman with metastatic renal cell carcinoma, with a malignant right pleural effusion and bilateral metastatic lung nodules.  She has been getting progressively short of breath and has had a recurrent right effusion.  She has been drained 16 times since Sept 7, 2018.  Over the last two weeks, the drainages have helped for a few hours at most.  She was drained this morning and still feels relief, though she is now on 5.5 L/min supplemental oxygen and came in on 3 L/min.    A/P: Patient is a 58 year old woman with worsening metastatic pulmonary nodules and a recurrent malignant pleural effusion.  I recommend placement of a tunneled pleural catheter.  I spoke with the patient and her  regarding the risks and benefits of a tunneled pleural catheter.  They are in agreement.  The tube should probably be placed on 11/1 to allow for reaccumulation of the effusion.  Placement into the largest pocket should be adequate.  I do not think surgical placement should be needed, nor is she a good candidate for pleurodesis, as I don't think the lung will expand to allow for optimal pleurodesis.    Patient and plan discussed with attending.    Thank you for the opportunity to participate in the care of this patient.    PMH:  Past Medical History:   Diagnosis Date     Hypertension      Malignant pleural effusion     requiring thoracentesis every 3-4 days, plus loculated component     Multinodular goiter     with hyperthyroidism, on methimezole     Renal cell cancer (H)     metastatic with heavy lung burden/malignant pleural effusion      Seizures (H)      Reviewed, as above.    PSH:  Past Surgical History:   Procedure Laterality Date     APPENDECTOMY       Reviewed, as above.    FH:  family history includes Bleeding Disorder in her mother; Bronchitis in her father and mother; Coronary Artery Disease in her father;  Heart Failure in her father; Schizophrenia in her sister.  Reviewed, as above.    SH:    Smoking status: Former Smoker       Quit date: 7/11/2018     Smokeless tobacco: Never Used     Alcohol use No         Allergies:  Allergies   Allergen Reactions     Promethazine Other (See Comments)     sob       Home Meds:  Prescriptions Prior to Admission   Medication Sig Dispense Refill Last Dose     amLODIPine (NORVASC) 2.5 MG tablet Take 2.5 mg by mouth every evening   10/28/2018 at Unknown time     Axitinib (INLYTA PO) Take 5 mg by mouth every 12 hours   10/28/2018 at Unknown time     Benzonatate (TESSALON PERLES PO) Take 100 mg by mouth 3 times daily as needed    10/28/2018 at Unknown time     calcium carbonate (OS- MG [place]) 600 MG tablet Take 1 tablet by mouth 2 times daily    10/28/2018 at Unknown time     guaiFENesin-codeine (ROBITUSSIN AC) 100-10 MG/5ML SOLN solution Take 1 tsp. by mouth every 4 hours as needed    10/28/2018 at Unknown time     HYDROcodone-acetaminophen (NORCO) 5-325 MG per tablet Take 1-2 tablets by mouth every 4 hours as needed for pain    Past Week at Unknown time     Lacosamide (VIMPAT) 100 MG TABS tablet Take 100 mg by mouth every morning   10/28/2018 at Unknown time     lacosamide (VIMPAT) 200 MG TABS tablet Take 200 mg by mouth every evening   10/28/2018 at Unknown time     LamoTRIgine (LAMICTAL) 300 MG TB24 Take 1 tablet by mouth daily   10/28/2018 at Unknown time     levETIRAcetam (KEPPRA) 1000 MG TABS Take 1 tablet by mouth 2 times daily   10/28/2018 at Unknown time     MethIMAzole (TAPAZOLE PO) Take 5 mg by mouth every evening    10/28/2018 at Unknown time     morphine (MS CONTIN) 15 MG 12 hr tablet Take 15 mg by mouth every 12 hours   10/28/2018 at Unknown time     morphine 10 MG/5ML solution Take 2.5 mg by mouth every 4 hours as needed (air hunger) Give with phenergan        Multiple Vitamins-Minerals (MULTIVITAMIN ADULT) TABS Take 1 tablet by mouth daily   10/28/2018 at  Unknown time     promethazine (PHENERGAN) 25 MG tablet Take 25 mg by mouth every 6 hours as needed Give with morphine liquid  For air hunger        zoledronic acid (ZOMETA) 4 MG/100ML SOLN Inject 4 mg into the vein once   Past Month at Unknown time       Physical Exam:  Temp:  [98.1  F (36.7  C)-98.2  F (36.8  C)] 98.2  F (36.8  C)  Pulse:  [] 102  Heart Rate:  [] 91  Resp:  [9-64] 24  BP: ()/(54-95) 112/70  SpO2:  [85 %-98 %] 91 %      Labs:  CBC  Recent Labs  Lab 10/29/18  0608   WBC 10.7   HGB 14.0   *     BMP  Recent Labs  Lab 10/29/18  0721 10/29/18  0608   NA  --  136   POTASSIUM 3.8 Canceled, Test credited   CHLORIDE  --  101   CO2  --  28   BUN  --  10   CR  --  0.65   GLC  --  105*     Imaging:  I reviewed the images from a CT chest done today, 10/29/2018.  There is worsening metastatic disease in the lungs and the patient is status post thoracentesis.    I spent a total of 30  minutes with Mrs. Law and spoke with her  on the phone, more than 50% of which were spent in counseling, coordination of care, and face-to-face time.        Albert Cardona

## 2018-10-29 NOTE — PLAN OF CARE
Problem: Patient Care Overview  Goal: Plan of Care/Patient Progress Review  Pt admittedfrom the ed w/ acute on chronic respiratory failure. Tachycardic, RR 24, 5 LPM nc to maintain 02 sats greater then 90%. Sepsis triggered. Results pending. Up w/ SBA. SZ pads placed for epilepsy, per pt experiences sz's at night. Reg. Diet.

## 2018-10-29 NOTE — IP AVS SNAPSHOT
STOP!!! DO NOT PRINT OR REFERENCE THIS AVS!!!  AVS displayed here is NOT the version that was given to the patient at discharge.  GO TO CHART REVIEW to print or review a copy of the AVS that was frozen/printed at time of discharge.                           MRN:[id number]                      After Visit Summary   10/29/2018    Meggan Law    MRN: [id number]           Thank you!     Thank you for choosing [facility] for your care. Our goal is always to provide you with excellent care. Hearing back from our patients is one way we can continue to improve our services. Please take a few minutes to complete the written survey that you may receive in the mail after you visit. If you would like to speak to someone directly about your visit please contact Patient Relations at [phone]. Thank you!          Patient Information     Date Of Birth          1960        Designated Caregiver       Most Recent Value    Caregiver    Will someone help with your care after discharge? yes    Name of designated caregiver Arvind    Phone number of caregiver [phone]    Caregiver address Same as patient.      About your hospital stay     You were admitted on:  October 29, 2018 You last received care in the:  [place] [address] [facility]    You were discharged on:  November 1, 2018       Who to Call     For medical emergencies, please call 911.  For non-urgent questions about your medical care, please call your primary care provider or clinic, [phone]          Attending Provider     Provider Specialty    Tio Lora MD Emergency Medicine    Sherri Gates MD Internal Medicine       Primary Care Provider Office Phone # Fax #    Brodie Parker [phone]      After Care Instructions     Activity       Your activity upon discharge: activity as tolerated  May drain the PleurX every day.            Diet       Follow this diet upon discharge: Orders Placed This Encounter      Regular  Diet Adult            Oxygen Adult       [place] Oxygen Order 10 liter(s) by nasal cannula continuously with use of portable tank. Expected treatment length is indefinite (99 months).. Test on conserving device as applicable.    Patients who qualify for home O2 coverage under the CMS guidelines require ABG tests or O2 sat readings obtained closest to, but no earlier than 2 days prior to the discharge, as evidence of the need for home oxygen therapy. Testing must be performed while patient is in the chronic stable state. See notes for O2 sats.    I certify that this patient, Meggan Law has been under my care and that I, or a nurse practitioner or physician's assistant working with me, had a face-to-face encounter that meets the face-to-face encounter requirements with this patient on 11/1/2018. The patient, Meggan Law was evaluated or treated in whole, or in part, for the following medical condition, which necessitates the use of the ordered oxygen. Treatment Diagnosis: cancer with metastasis    Attending Provider: Katlin Roy  Physician signature: See electronic signature associated with these discharge orders  Date of Order: November 1, 2018                  Follow-up Appointments     Follow-up and recommended labs and tests        Follow up with hospice team  No follow up labs or test are needed.                  Further instructions from your care team       CM: [facility] [phone]     Pending Results     Date and Time Order Name Status Description    10/29/2018 0607 [facility] US ECHO LIMITED In process             Statement of Approval     Ordered          11/01/18 1103  I have reviewed and agree with all the recommendations and orders detailed in this document.  EFFECTIVE NOW     Approved and electronically signed by:  Katlin Roy MD             Admission Information     Date & Time Department Dept. Phone    10/29/2018 [place] [address] [facility] [phone]      Your Vitals  "Were     Blood Pressure Pulse Temperature Respirations Height Weight    91/58 (BP Location: Left arm) 103 96  F (35.6  C) (Oral) 28 1.575 m (5' 2\") 40 kg (88 lb 1.6 oz)    Pulse Oximetry BMI (Body Mass Index)                90% 16.11 kg/m2          [email or web address] Information     [employer] gives you secure access to your electronic health record. If you see a primary care provider, you can also send messages to your care team and make appointments. If you have questions, please call your primary care clinic.  If you do not have a primary care provider, please call [phone] and they will assist you.        Care EveryWhere ID     This is your Care EveryWhere ID. This could be used by other organizations to access your [place] medical records  [id number]        Equal Access to Services     PROSPER STEINER : Abel Murrieta, veronica shetty, myriam galarza, areli villatoro . So [facility] [phone].    ATENCIÓN: Si habla español, tiene a hinson disposición servicios gratuitos de asistencia lingüística. Alexis al [phone].    We comply with applicable federal civil rights laws and [place] laws. We do not discriminate on the basis of race, color, national origin, age, disability, sex, sexual orientation, or gender identity.               Review of your medicines      START taking        Dose / Directions    acetaminophen 325 MG tablet   Commonly known as:  TYLENOL   Used for:  End of life care        Dose:  325-650 mg   Take 1-2 tablets (325-650 mg) by mouth every 4 hours as needed for mild pain or fever   Quantity:  100 tablet   Refills:  1       atropine 1 % ophthalmic solution   Used for:  End of life care        Dose:  2-4 drop   Take 2-4 drops by mouth, place under tongue or place inside cheek every 2 hours as needed for other (terminal respiratory secretions) Not for ophthalmic use.   Quantity:  5 mL   Refills:  0       bisacodyl 10 MG Suppository   Commonly known as:  " DULCOLAX   Used for:  End of life care        Unwrap and insert 1 suppository rectally twice daily as needed for constipation.   Quantity:  12 suppository   Refills:  1       Lidocaine 4 % Patch   Commonly known as:  LIDOCARE   Used for:  End of life care        Dose:  1-2 patch   Place 1-2 patches onto the skin every 24 hours Apply patch(s) to painful area of chest wall as needed. To prevent lidocaine toxicity, patient should be patch free for 12 hrs daily. Patches may be cut to smaller size prior to removing release liner. This is over the counter.   Refills:  0       LORazepam 2 MG/ML (HIGH CONC) solution   Commonly known as:  ATIVAN   Used for:  End of life care        Dose:  0.5-1 mg   Take 0.25-0.5 mLs (0.5-1 mg) by mouth, place under tongue or insert rectally every 3 hours as needed for agitation, anxiety, seizures or vomiting (restlessness)   Quantity:  30 mL   Refills:  0       MEDICATION INSTRUCTION   Used for:  End of life care        If care facility cannot accept or use ranges, facility is instructed to use lower end of dosing range   Refills:  0       ondansetron 4 MG ODT tab   Commonly known as:  ZOFRAN-ODT   Used for:  End of life care        Dose:  4 mg   Take 1 tablet (4 mg) by mouth every 6 hours   Quantity:  14 tablet   Refills:  0       sennosides 8.6 MG tablet   Commonly known as:  SENOKOT   Used for:  End of life care        Dose:  1-2 tablet   Take 1-2 tablets by mouth 2 times daily   Quantity:  100 tablet   Refills:  0         CONTINUE these medicines which may have CHANGED, or have new prescriptions. If we are uncertain of the size of tablets/capsules you have at home, strength may be listed as something that might have changed.        Dose / Directions    * MS CONTIN 15 MG 12 hr tablet   This may have changed:    - Another medication with the same name was added. Make sure you understand how and when to take each.  - Another medication with the same name was removed. Continue taking this  medication, and follow the directions you see here.   Generic drug:  morphine        Dose:  15 mg   Take 15 mg by mouth every 12 hours   Refills:  0       * morphine sulfate (high concentrate) 20 MG/ML concentrated solution   Commonly known as:  ROXANOL-CONCENTRATED   This may have changed:  You were already taking a medication with the same name, and this prescription was added. Make sure you understand how and when to take each.   Used for:  End of life care   Replaces:  morphine 10 MG/5ML solution        Dose:  5-10 mg   Place 0.25-0.5 mLs (5-10 mg) under the tongue every 2 hours as needed for shortness of breath / dyspnea or breakthrough pain (or cough)   Quantity:  118 mL   Refills:  0       * Notice:  This list has 2 medication(s) that are the same as other medications prescribed for you. Read the directions carefully, and ask your doctor or other care provider to review them with you.      CONTINUE these medicines which have NOT CHANGED        Dose / Directions    guaiFENesin-codeine 100-10 MG/5ML Soln solution   Commonly known as:  ROBITUSSIN AC        Dose:  1 tsp.   Take 1 tsp. by mouth every 4 hours as needed   Refills:  0       KEPPRA 1000 MG Tabs   Generic drug:  levETIRAcetam        Dose:  1 tablet   Take 1 tablet by mouth 2 times daily   Refills:  0       LamoTRIgine 300 MG Tb24   Commonly known as:  LaMICtal        Dose:  1 tablet   Take 1 tablet by mouth daily   Refills:  0       TESSALON PERLES PO        Dose:  100 mg   Take 100 mg by mouth 3 times daily as needed   Refills:  0       * VIMPAT 100 MG Tabs tablet   Generic drug:  Lacosamide        Dose:  100 mg   Take 100 mg by mouth every morning   Refills:  0       * VIMPAT 200 MG Tabs tablet   Generic drug:  lacosamide        Dose:  200 mg   Take 200 mg by mouth every evening   Refills:  0       * Notice:  This list has 2 medication(s) that are the same as other medications prescribed for you. Read the directions carefully, and ask your doctor or  other care provider to review them with you.      STOP taking     amLODIPine 2.5 MG tablet   Commonly known as:  NORVASC           calcium carbonate 600 MG tablet   Commonly known as:  OS- mg [place]. [place]           HYDROcodone-acetaminophen 5-325 MG per tablet   Commonly known as:  NORCO           INLYTA PO           morphine 10 MG/5ML solution   Replaced by:  morphine sulfate (high concentrate) 20 MG/ML concentrated solution   You also have another medication with the same name that you need to continue taking as instructed.           MULTIVITAMIN ADULT Tabs           promethazine 25 MG tablet   Commonly known as:  PHENERGAN           TAPAZOLE PO           zoledronic acid 4 MG/100ML Soln   Commonly known as:  ZOMETA                Where to get your medicines      These medications were sent to [place], [place] - [address] [facility]  [address] [facility] [address]     Phone:  [phone]     acetaminophen 325 MG tablet    bisacodyl 10 MG Suppository    ondansetron 4 MG ODT tab    sennosides 8.6 MG tablet         Some of these will need a paper prescription and others can be bought over the counter. Ask your nurse if you have questions.     Bring a paper prescription for each of these medications     atropine 1 % ophthalmic solution    LORazepam 2 MG/ML (HIGH CONC) solution    morphine sulfate (high concentrate) 20 MG/ML concentrated solution       You don't need a prescription for these medications     Lidocaine 4 % Patch    MEDICATION INSTRUCTION                Protect others around you: Learn how to safely use, store and throw away your medicines at www.disposemymeds.org.        Information about OPIOIDS     PRESCRIPTION OPIOIDS: WHAT YOU NEED TO KNOW   We gave you an opioid (narcotic) pain medicine. It is important to manage your pain, but opioids are not always the best choice. You should first try all the other options your care team gave you. Take this medicine  for as short a time (and as few doses) as possible.    Some activities can increase your pain, such as bandage changes or therapy sessions. It may help to take your pain medicine 30 to 60 minutes before these activities. Reduce your stress by getting enough sleep, working on hobbies you enjoy and practicing relaxation or meditation. Talk to your care team about ways to manage your pain beyond prescription opioids.    These medicines have risks:    DO NOT drive when on new or higher doses of pain medicine. These medicines can affect your alertness and reaction times, and you could be arrested for driving under the influence (DUI). If you need to use opioids long-term, talk to your care team about driving.    DO NOT operate heavy machinery    DO NOT do any other dangerous activities while taking these medicines.    DO NOT drink any alcohol while taking these medicines.     If the opioid prescribed includes acetaminophen, DO NOT take with any other medicines that contain acetaminophen. Read all labels carefully. Look for the word  acetaminophen  or  Tylenol.  Ask your pharmacist if you have questions or are unsure.    You can get addicted to pain medicines, especially if you have a history of addiction (chemical, alcohol or substance dependence). Talk to your care team about ways to reduce this risk.    All opioids tend to cause constipation. Drink plenty of water and eat foods that have a lot of fiber, such as fruits, vegetables, prune juice, apple juice and high-fiber cereal. Take a laxative (Miralax, milk of magnesia, Colace, Senna) if you don t move your bowels at least every other day. Other side effects include upset stomach, sleepiness, dizziness, throwing up, tolerance (needing more of the medicine to have the same effect), physical dependence and slowed breathing.    Store your pills in a secure place, locked if possible. We will not replace any lost or stolen medicine. If you don t finish your medicine,  please throw away (dispose) as directed by your pharmacist. The [place] [employer]ution [employer] has more information about safe disposal: https://www.pca.state.[place]/living-green/managing-unwanted-medications             Medication List: This is a list of all your medications and when to take them. Check marks below indicate your daily home schedule. Keep this list as a reference.      Medications           Morning Afternoon Evening Bedtime As Needed    acetaminophen 325 MG tablet   Commonly known as:  TYLENOL   Take 1-2 tablets (325-650 mg) by mouth every 4 hours as needed for mild pain or fever                                atropine 1 % ophthalmic solution   Take 2-4 drops by mouth, place under tongue or place inside cheek every 2 hours as needed for other (terminal respiratory secretions) Not for ophthalmic use.                                bisacodyl 10 MG Suppository   Commonly known as:  DULCOLAX   Unwrap and insert 1 suppository rectally twice daily as needed for constipation.                                guaiFENesin-codeine 100-10 MG/5ML Soln solution   Commonly known as:  ROBITUSSIN AC   Take 1 tsp. by mouth every 4 hours as needed   Last time this was given:  5 mLs on 10/31/2018 10:19 PM                                KEPPRA 1000 MG Tabs   Take 1 tablet by mouth 2 times daily   Last time this was given:  1,000 mg on 11/1/2018  9:49 AM   Generic drug:  levETIRAcetam                                LamoTRIgine 300 MG Tb24   Commonly known as:  LaMICtal   Take 1 tablet by mouth daily   Last time this was given:  300 mg on 11/1/2018  9:49 AM                                Lidocaine 4 % Patch   Commonly known as:  LIDOCARE   Place 1-2 patches onto the skin every 24 hours Apply patch(s) to painful area of chest wall as needed. To prevent lidocaine toxicity, patient should be patch free for 12 hrs daily. Patches may be cut to smaller size prior to removing release liner. This is over the counter.                                 LORazepam 2 MG/ML (HIGH CONC) solution   Commonly known as:  ATIVAN   Take 0.25-0.5 mLs (0.5-1 mg) by mouth, place under tongue or insert rectally every 3 hours as needed for agitation, anxiety, seizures or vomiting (restlessness)                                MEDICATION INSTRUCTION   If care facility cannot accept or use ranges, facility is instructed to use lower end of dosing range                                * MS CONTIN 15 MG 12 hr tablet   Take 15 mg by mouth every 12 hours   Last time this was given:  15 mg on 11/1/2018  5:33 AM   Generic drug:  morphine                                * morphine sulfate (high concentrate) 20 MG/ML concentrated solution   Commonly known as:  ROXANOL-CONCENTRATED   Place 0.25-0.5 mLs (5-10 mg) under the tongue every 2 hours as needed for shortness of breath / dyspnea or breakthrough pain (or cough)                                ondansetron 4 MG ODT tab   Commonly known as:  ZOFRAN-ODT   Take 1 tablet (4 mg) by mouth every 6 hours   Last time this was given:  4 mg on 11/1/2018  9:49 AM                                sennosides 8.6 MG tablet   Commonly known as:  SENOKOT   Take 1-2 tablets by mouth 2 times daily                                TESSALON PERLES PO   Take 100 mg by mouth 3 times daily as needed   Last time this was given:  100 mg on 10/30/2018  3:06 PM                                * VIMPAT 100 MG Tabs tablet   Take 100 mg by mouth every morning   Last time this was given:  100 mg on 11/1/2018  9:49 AM   Generic drug:  Lacosamide                                * VIMPAT 200 MG Tabs tablet   Take 200 mg by mouth every evening   Last time this was given:  100 mg on 11/1/2018  9:49 AM   Generic drug:  lacosamide                                * Notice:  This list has 4 medication(s) that are the same as other medications prescribed for you. Read the directions carefully, and ask your doctor or other care provider to review them with you.

## 2018-10-29 NOTE — PROGRESS NOTES
"Date:10/29/2018  Admission Dx: Acute on chronic resp fail with hypoxia, malignant pleural effusion, possible copd exac  Pulmonary History: COPD, malignant pleural effusion  Home Nebulizer/MDI Use: previous note of albuterol mdi pt recalls   Home Oxygen: 3LPM    Acuity Level (RCAT flow sheet): 3    Aerosol Therapy initiated: Duoneb QID, Albuterol prn    Pulmonary Hygiene initiated: Deep breathe and cough    Volume Expansion initiated: IS    Current Oxygen Requirements:5LPM Nc with bubbler    Current SpO2:96%    Re-evaluation date: 1 November     Patient Education: Patient informed of medication possible side effects and benefits, also encouraged use of IS and tripod positioning for breathing recovery.    See \"RT Assessments\" flow sheet for patient assessment scoring and Acuity Level Details.         Lillian Sharp RRT  10/29/2018    "

## 2018-10-29 NOTE — ED TRIAGE NOTES
Pt awoke from her sleep with acute onset SOB. Pt normally on 2.5LPM via nasal canula. Pt has hx of cancer for the last year. Pt reports pain in her throat and feels like it is more narrow than normal. Pt A&Ox4.

## 2018-10-29 NOTE — PHARMACY-ADMISSION MEDICATION HISTORY
Admission medication history interview status for this patient is complete. See [facility] admission navigator for allergy information, prior to admission medications and immunization status.     Medication history interview source(s):Patient  Medication history resources (including written lists, pill bottles, clinic record):patient had list  Primary pharmacy:    Changes made to PTA medication list:  Added: Morphine liquid, phenergan, amlodipine  Deleted: albuterol inhaler, ranitidine,  Changed: benzonate, calcium, Methimazole,     Actions taken by pharmacist (provider contacted, etc):None     Additional medication history information:None    Medication reconciliation/reorder completed by provider prior to medication history? No    Do you take OTC medications (eg tylenol, ibuprofen, fish oil, eye/ear drops, etc)? Y(Y/N)    For patients on insulin therapy: NA (Y/N)      Prior to Admission medications    Medication Sig Last Dose Taking? Auth Provider   amLODIPine (NORVASC) 2.5 MG tablet Take 2.5 mg by mouth every evening 10/28/2018 at Unknown time Yes Unknown, Entered By History   Axitinib (INLYTA PO) Take 5 mg by mouth every 12 hours 10/28/2018 at Unknown time Yes Reported, Patient   Benzonatate (TESSALON PERLES PO) Take 100 mg by mouth 3 times daily as needed  10/28/2018 at Unknown time Yes Unknown, Entered By History   calcium carbonate (OS- MG [place]. [place]) 600 MG tablet Take 1 tablet by mouth 2 times daily  10/28/2018 at Unknown time Yes Unknown, Entered By History   guaiFENesin-codeine (ROBITUSSIN AC) 100-10 MG/5ML SOLN solution Take 1 tsp. by mouth every 4 hours as needed  10/28/2018 at Unknown time Yes Unknown, Entered By History   HYDROcodone-acetaminophen (NORCO) 5-325 MG per tablet Take 1-2 tablets by mouth every 4 hours as needed for pain  Past Week at Unknown time Yes Unknown, Entered By History   Lacosamide (VIMPAT) 100 MG TABS tablet Take 100 mg by mouth every morning 10/28/2018 at Unknown time Yes  Unknown, Entered By History   lacosamide (VIMPAT) 200 MG TABS tablet Take 200 mg by mouth every evening 10/28/2018 at Unknown time Yes Unknown, Entered By History   LamoTRIgine (LAMICTAL) 300 MG TB24 Take 1 tablet by mouth daily 10/28/2018 at Unknown time Yes Unknown, Entered By History   levETIRAcetam (KEPPRA) 1000 MG TABS Take 1 tablet by mouth 2 times daily 10/28/2018 at Unknown time Yes Unknown, Entered By History   MethIMAzole (TAPAZOLE PO) Take 5 mg by mouth every evening  10/28/2018 at Unknown time Yes Unknown, Entered By History   morphine (MS CONTIN) 15 MG 12 hr tablet Take 15 mg by mouth every 12 hours 10/28/2018 at Unknown time Yes Unknown, Entered By History   morphine 10 MG/5ML solution Take 2.5 mg by mouth every 4 hours as needed (air hunger) Give with phenergan  Yes Unknown, Entered By History   Multiple Vitamins-Minerals (MULTIVITAMIN ADULT) TABS Take 1 tablet by mouth daily 10/28/2018 at Unknown time Yes Unknown, Entered By History   promethazine (PHENERGAN) 25 MG tablet Take 25 mg by mouth every 6 hours as needed Give with morphine liquid  For air hunger  Yes Unknown, Entered By History   zoledronic acid (ZOMETA) 4 MG/100ML SOLN Inject 4 mg into the vein once Past Month at Unknown time Yes Unknown, Entered By History

## 2018-10-29 NOTE — ED PROVIDER NOTES
History     Chief Complaint:  shortness of breath       HPI   Meggan Law is a 58 year old female currently undergoing treatment for renal cancer who presents with shortness of breath and throat pain. The patient states she receives a thoracentesis every 3-4 days due to fluid build up on her right lung and her last thoracentesis was 3 days ago. She states that two days ago her throat started feeling swollen and painful which made her baseline shortness of breath much worse. The patient states that moving her head side to side and up and down reproduces the pain in her throat. She denies any extra pain with swallowing. The patient also reports a hoarse voice, but denies any change in her cough, change in her appetite, change to her baseline chest pain, or fever. She notes that 4 days ago she started a new chemotherapy drug, Inlyta. The patient is on 3 L of oxygen at home at her baseline. She notes that she has a thoracentesis scheduled for later this morning.           Allergies:  No known allergies     Medications:    Proair  Tessalon perles  Vimpat  Lamictal  Keppra  Tapazole  Ranitidine  Zometa  Inlyta  MS Rula Yañez     Past Medical History:    Cancer  Seizures    Past Surgical History:    Appendectomy    Family History:    No past pertinent family history.    Social History:  Patient presents with   Former smoker  Negative for alcohol use.  Marital Status:        Review of Systems   Constitutional: Negative for appetite change and fever.   HENT: Positive for sore throat and voice change. Negative for trouble swallowing.    Respiratory: Positive for cough and shortness of breath.    Cardiovascular: Positive for chest pain.   All other systems reviewed and are negative.      Physical Exam     Patient Vitals for the past 24 hrs:   BP Temp Pulse Heart Rate Resp SpO2 Height Weight   10/29/18 1100 105/69 - - 84 (!) 31 95 % - -   10/29/18 1045 100/68 - - 81 28 95 % - -   10/29/18 1030 93/72 -  "- 85 (!) 31 94 % - -   10/29/18 1015 101/63 - - 85 25 93 % - -   10/29/18 1007 - - - 88 (!) 31 94 % - -   10/29/18 1000 123/76 - - 93 - (!) 86 % - -   10/29/18 0945 104/63 - - 86 - 96 % - -   10/29/18 0930 109/68 - - 84 19 97 % - -   10/29/18 0922 - - - 89 17 97 % - -   10/29/18 0845 115/88 - - - (!) 45 95 % - -   10/29/18 0830 108/82 - - 96 9 94 % - -   10/29/18 0745 - - - - - 96 % - -   10/29/18 0730 125/78 - - 86 29 96 % - -   10/29/18 0715 116/76 - - 88 (!) 38 97 % - -   10/29/18 0700 - - - 94 22 92 % - -   10/29/18 0645 125/75 - - - - - - -   10/29/18 0630 131/81 - - - - 98 % - -   10/29/18 0615 (!) 151/95 - - - - 98 % - -   10/29/18 0603 156/89 98.1  F (36.7  C) 91 91 20 98 % 1.575 m (5' 2\") 41.7 kg (92 lb)   10/29/18 0602 - - - - - 98 % - -   10/29/18 0559 156/89 - - - - (!) 85 % - -         Physical Exam    HENT: Moist oral mucosa.   Eyes: Grossly normal vision. Pupils are equal and round. Extraocular movements intact.  Sclera clear and without icterus. Conjunctiva without pallor.  Cardiovascular: Normal rate. Regular rhythm. S1 and S2 without audible murmurs. 2+ radial pulses. Normal capillary refill.  Respiratory: Rate and effort increased. No stridor. Decreased breath sounds over right hemithorax. No wheezing or crackles.  Gastrointestinal: Soft. No distension. No tenderness to palpation. No rebound or guarding.  Neurologic: Alert and awake. Coordinated movement of extremities. Speech normal.  Skin: No diaphoresis, rashes, ecchymoses, or lesions.  Musculoskeletal: No lower extremity edema. No gross deformity. No joint swelling.  Psychiatric: Appropriate affect. Behavior is normal. Intact recent and remote memory. Linear thought process.      Emergency Department Course   ECG:  Time: 0622  Vent. Rate 87 bpm. NH interval 132. QRS duration 74. QT/QTc 356/428. P-R-T axis 54 27 8. Normal sinus rhythm. Possible left atrial enlargement. Borderline ECG. No change compared to EKG dated 8/11/18 Read time: " 0638      Imaging:  Radiographic findings were communicated with the patient who voiced understanding of the findings.  0650 X-ray Chest, 2 views:  1. Right pleural effusion.  2. Multiple pulmonary nodules.  Result per radiology.    US Thoracentesis  1.  Ultrasound-guided thoracentesis, as detailed above.  2.  Total volume of fluid removed from 2 separate accesses, 1300 mL  Per radiology    0818 X-ray Chest, 1 view:  Improved aeration at the right lung base compared to  earlier today. No pneumothorax. Bilateral ill-defined nodular  opacities are noted.  Result per radiology.      POC US Echo: see procedure note      CT Chest with IV Contrast:   1. No pulmonary embolism demonstrated.  2. No thoracic aortic dissection or aneurysm.   3. Increased metastatic disease.  4. Increased partially loculated right pleural effusion.  per radiology.       Laboratory:  Potassium: 3.8  ISTAT gases venous: pO2: 23 (L), Bicarbonate: 29 (H), o/w WNL  CBC: WBC: 10.7, HGB: 14.0, PLT: 537 (H)  BMP: Glucose 105 (H), GFR: 0 (L), o/w WNL (Creatinine: 0.65)  Troponin:  <0.015  TSH : 2.14    Fiberoptic Nasopharyngoscopy    Indication: throat tightness, dysphonia    Patient understands the reasons for, risks of, alternatives to this procedure.    Preparation: The left nare was prepared with topical Afrin 0.5% and Lidocaine 4%    Description of procedure: Fiberoptic flexible bronchoscope was advanced through the prepared nare until clear view of the base of the tongue and laryngeal structures was attained.    Findings:    Base of tongue without erythema, lesion, or swelling   Epiglottis without swelling   Vallecula without lesion or foreign body   Arytenoids with no swelling or lesions   Symmetric movement of the vocal cords with full closure during vocalization and normal movement with respiratory cycle    Procedure was without complication and the patient tolerated the procedure well.        POC US ECHO LIMITED (In process) Result time:  10/29/18 06:28:19     In process by Tio Lora MD (10/29/18 06:28:19)     Impression:     Limited Bedside Cardiac Ultrasound, performed and interpreted by me.   Indication: Shortness of Breath.  Thoracic views were acquired.   Image quality was satisfactory.    Findings:    Abnormal large right sided hypoechoic areas with septation throughout the right hemithorax    IMPRESSION: Moderate to large right sided pleural effusion with some areas of septation        Interventions:  0852 - Albuterol-Ipratropium 2.5mg-0.5mg/3ml, inhalation solution, Nebulizer  1115 - Albuterol-Ipratropium 2.5mg-0.5mg/3ml, inhalation solution, Nebulizer  1115 - Keppra 1000 mg PO  1124 - Vimpat 100 mg PO  1124 - Lamictal 300 mg PO      Emergency Department Course:  Nursing notes and vitals reviewed.  0558: I performed an exam of the patient as documented above. I performed a POC ultrasound (see procedure note)    0654: I performed a nasopharyngoscopy (see procedure note).    0725: I discussed the case with Dr. Garcia on call for Dr. Morales, the patient's [facility] oncologist,  regarding the patient.    0836: I rechecked the patient.    1104: I discussed the case with the Dr. Morales, patient's oncologist, regarding the patient.    1109: Findings and plan explained to the Patient who consents to admission.     1123: Discussed the patient with Dr. Gates, who will admit the patient to a medical bed for further monitoring, evaluation, and treatment.       Impression & Plan      Medical Decision Making:  Patient presents with worsening dyspnea and sensation of throat closure and hoarseness over the weekend.  She is known to have recurrent right-sided pleural effusions, and some of her symptoms feel similar to previous episodes of this, but the throat sensations are new for her.  Of note, the patient recently started a new chemotherapy agent the day prior to her symptoms worsening.  She is noted to be hypoxic greater than her normal  requirement of 3 L at home on arrival to the emergency department.  Her right hemithorax does have decreased breath sounds, and bedside ultrasound shows a large pleural effusion with septations.  The rest of her exam is unremarkable.  A bedside nasopharyngoscopy was performed and shows no glottic structure edema or dysfunctional vocal cord movement.  The patient underwent interventional radiology ultrasound thoracentesis with drainage of 1300 cc of fluid.  She did feel symptomatically relieved, but still had the sensation of throat closure and air hunger.  She was also noted to be persistently hypoxic with a requirement of 4.5 L by nasal cannula to maintain in the 90s.  Given her cancer history, her risk of PE is elevated, so CT pulmonary angiogram was performed.  This is negative for PE does but does show worsening tumor burden throughout the pulmonary parenchyma.  It is possible that she is having a worsening of her underlying lung disease as the cause of her dyspnea and hypoxia, but given the recent medication change and rapidity of symptom onset, there are possibly reversible causes here.  I discussed the case with the on-call oncologist at the [facility] where the medication was prescribed, and they do not believe that this is a side effect of this medication but think that it is reasonable to hold the medication because it has a short half-life and would be out of her system quickly.  The patient was given duo nebs in the emergency department and did have symptomatic improvement, but was still noted to require the same amount of supplemental O2.  She requires admission to the hospital for further management and monitoring of her symptoms.  The patient is agreeable to the admission and her care was signed out to the hospitalist team.  She left the emergency department in stable condition.      Diagnosis:    ICD-10-CM    1. Hypoxia R09.02    2. Dyspnea, unspecified type R06.00        Nile DIAMOND, am serving  as a scribe on 10/29/2018 at 5:58 AM to personally document services performed by Tio Lora MD based on my observations and the provider's statements to me.       Nile Mckoy  10/29/2018   [facility] EMERGENCY DEPARTMENT       Tio Lora MD  10/29/18 [id number]

## 2018-10-29 NOTE — H&P
History and Physical     Meggan Law MRN# [id number]   YOB: 1960 Age: 58 year old      Date of Admission:  10/29/2018    Primary care provider: Brodie Parker          Assessment and Plan:   Summary of Stay: Meggan Law is a 58 year old female with a history of hypertension, multinodular goiter with hyperthyroidism, COPD, epilepsy, and renal cell carcinoma with heavy lung burden from metastatic disease further complicated by recurrent pleural effusion(malignant), chronic hypoxic respiratory failure on nasal cannula oxygen at 3 L, admitted on 10/29/2018 with complaints of increasing shortness of breath and a sense that her throat was closing up.    She had extensive and appropriate workup in the emergency room including a chest x-ray that showed recurrence of a pleural effusion (most recently tapped 3 days ago), negative EKG and negative troponin, she underwent thoracentesis of approximately 1400 cc and remained hypoxic and still short of breath prompting CT scan of the chest which was negative for pulmonary embolism but did show progressive metastatic lung disease.    Regarding her throat and sensation that it was closing up, bedside laryngoscopy was performed by Dr. Jane showed normal pharyngeal and laryngeal structures without evidence of angioedema or vocal cord abnormality.    She is currently improved but still short of breath and tachypneic and will be admitted for acute on chronic hypoxic respiratory failure, multifactorial in etiology  Problem List:   1. Acute on chronic hypoxic respiratory failure: Sats were in the 85% range on 3 L by nasal cannula.  She states she normally saturates at about 94% on her 3 L.  She was also noted to be tachypneic throughout her ER course in the mid 20s to mid 40 range.  She required increase in her oxygen to 5 L and that subsequently been weaned down to 4 L but she is still somewhat lower from a hypoxic standpoint compared to her her baseline as  she is currently in the 91-92% range.  I suspect this is multifactorial in etiology including progressive metastatic disease, recurrent pleural effusion and associated atelectasis, and possible COPD exacerbation she has minimal air movement.  She is underwent thoracentesis, I will admit her for nebulized therapy, but will hold off on any steroids at this time.  Of note there is no evidence of an acidosis that might drive her respiratory rate  2. Recurrent malignant pleural effusion: Last tap 3 days prior to admission and then needed to 1400 cc removed today.  She is required taps every 3-4 days which seems unsustainable to me.  I will ask for thoracic surgical consultation from Dr. Cardona for Pleurx catheter placement versus pleurodesis.  3. Throat tightening: Unclear etiology.  She was started on a new chemotherapeutic agent is an oral tablet that goes by the name of inlyta sometime last week which can cause dysphonia but has no association with angioedema, she attributes it to taking promethazine that she was prescribed by the palliative care service last week-and on review of UTD anxiety and angioedema are listed as side effects.  This actually seems like the more likely culprit.  For now will hold both.  I will add promethazine to her allergy list, but I'll hold off on the inlyta  4. Htn:  Cont home amlodipine 2.5 mg per day  5. Epilepsy:  Cont home lamotrigine and vimpat  6. Metastatic renal cell cancer: refer back to oncology at discharge  7. Asthma/copd:  ? Exacerbation-poor air movement but no wheezing.  duonebs qid with prn albuterol, will hold off on steroids for now  8. Hyperthyroidism:  Cont methimazole  9. Subacute and constant sternal pain: discussed with radiology-no e/o metastatic disease, suspect MSK due to chronic coughing.  Trial icy hot patch  10. Chronic pain in setting of metastatic RCC-home morphine dosing  11. Prognosis:  Terminal.  She understands that now.  She is already involved in  palliative care and is DNR/I, but apparently her  is not ready for hospice.  That being said, she may be.  Have asked that POLST form be given to patient to fill out.  Will hold off on palliative care consultation  DVT Prophylaxis: Enoxaparin (Lovenox) SQ  Code Status: DNR/I              Chief Complaint:   SOB/sense that throat is closing up       History of Present Illness:   Meggan Law is a 58 year old female with a history of hypertension, multinodular goiter with hyperthyroidism, COPD, epilepsy, and renal cell carcinoma with heavy lung burden from metastatic disease further complicated by recurrent pleural effusion(malignant), chronic hypoxic respiratory failure on nasal cannula oxygen at 3 L, admitted on 10/29/2018 with complaints of increasing shortness of breath and a sense that her throat was closing up.    Her symptoms began 3 days ago after her last thoracentesis.  Mainly just consist of feeling more short of breath and unable to take a deep breath.  When it first began it was not severe or significant enough that she felt like she needed to be evaluated.  Over the weekend it seemed to worsen but again not to the degree that she felt like she needed to seek evaluation.  Then overnight into the day of presentation she had progressive shortness of breath a sense that her throat was tightening up and she was unable to pass a air at all.  She finally decided to come in for evaluation at that time.    Recent medication changes have included initiation of Inlyta for RCC chemotherapy, and the addition of promethazine to morphine for air hunger.  She feels like though worsening shortness of breath and throat tightness has been more in association with the addition of the promethazine that her new chemotherapy agent.  Dr Jane did reach out to her [facility] oncologist who prescribed her chemo drug and that can cause dysphonia but would be highly unlikely to cause angioedema/anaphylaxis.    She states  that she feels like she chronically has fevers and chills since her diagnosis of renal cell Cancer these are unchanged, she has a chronic cough that is nonproductive also unchanged she reports about a 10-day history of almost constant sternal pain that she thinks is in association with coughing as well as some right lower rib pain of similar duration.    ER evaluation: Most notable for saturations of 85% despite her typical 3 L by nasal cannula, tachypnea in the mid 20s to mid 40s but normal blood pressure and heart rate.  She is also afebrile.  Her oxygen saturations rebounded into the mid 90 range with increasing of her nasal cannula oxygen from 3-5 L.    She had extensive and appropriate workup in the emergency room including a chest x-ray that showed recurrence of a pleural effusion (most recently tapped 3 days ago), negative EKG and negative troponin, she underwent thoracentesis of approximately 1400 cc and remained hypoxic and still short of breath prompting CT scan of the chest which was negative for pulmonary embolism but did show progressive metastatic lung disease.    Regarding her throat and sensation that it was closing up, bedside laryngoscopy was performed by Dr. Jane showed normal pharyngeal and laryngeal structures without evidence of angioedema or vocal cord abnormality.      The history is obtained in discussion with the ER provider Dr Jane  And the patient with excellent reliability         Past Medical History:     Past Medical History:   Diagnosis Date     Hypertension      Malignant pleural effusion     requiring thoracentesis every 3-4 days, plus loculated component     Multinodular goiter     with hyperthyroidism, on methimezole     Renal cell cancer (H)     metastatic with heavy lung burden/malignant pleural effusion      Seizures (H)              Past Surgical History:     Past Surgical History:   Procedure Laterality Date     APPENDECTOMY               Social History:     Social  History   Substance Use Topics     Smoking status: Former Smoker     Quit date: 7/11/2018     Smokeless tobacco: Never Used     Alcohol use No   Lives with  independent in [place], DNR/I, currently working with palliative care but not on hospice           Family History:     Family History   Problem Relation Age of Onset     Bronchitis Mother      Bleeding Disorder Mother      Heart Failure Father      Coronary Artery Disease Father      Bronchitis Father      Schizophrenia Sister             Allergies:   No Known Allergies          Medications:     Prior to Admission medications    Medication Sig Last Dose Taking? Auth Provider   amLODIPine (NORVASC) 2.5 MG tablet Take 2.5 mg by mouth every evening 10/28/2018 at Unknown time Yes Unknown, Entered By History   Axitinib (INLYTA PO) Take 5 mg by mouth every 12 hours 10/28/2018 at Unknown time Yes Reported, Patient   Benzonatate (TESSALON PERLES PO) Take 100 mg by mouth 3 times daily as needed  10/28/2018 at Unknown time Yes Unknown, Entered By History   calcium carbonate (OS- [facility] [place]. [place]) 600 MG tablet Take 1 tablet by mouth 2 times daily  10/28/2018 at Unknown time Yes Unknown, Entered By History   guaiFENesin-codeine (ROBITUSSIN AC) 100-10 MG/5ML SOLN solution Take 1 tsp. by mouth every 4 hours as needed  10/28/2018 at Unknown time Yes Unknown, Entered By History   HYDROcodone-acetaminophen (NORCO) 5-325 MG per tablet Take 1-2 tablets by mouth every 4 hours as needed for pain  Past Week at Unknown time Yes Unknown, Entered By History   Lacosamide (VIMPAT) 100 MG TABS tablet Take 100 mg by mouth every morning 10/28/2018 at Unknown time Yes Unknown, Entered By History   lacosamide (VIMPAT) 200 MG TABS tablet Take 200 mg by mouth every evening 10/28/2018 at Unknown time Yes Unknown, Entered By History   LamoTRIgine (LAMICTAL) 300 MG TB24 Take 1 tablet by mouth daily 10/28/2018 at Unknown time Yes Unknown, Entered By History   levETIRAcetam (KEPPRA) 1000 MG  "TABS Take 1 tablet by mouth 2 times daily 10/28/2018 at Unknown time Yes Unknown, Entered By History   MethIMAzole (TAPAZOLE PO) Take 5 mg by mouth every evening  10/28/2018 at Unknown time Yes Unknown, Entered By History   morphine (MS CONTIN) 15 MG 12 hr tablet Take 15 mg by mouth every 12 hours 10/28/2018 at Unknown time Yes Unknown, Entered By History   morphine 10 MG/5ML solution Take 2.5 mg by mouth every 4 hours as needed (air hunger) Give with phenergan  Yes Unknown, Entered By History   Multiple Vitamins-Minerals (MULTIVITAMIN ADULT) TABS Take 1 tablet by mouth daily 10/28/2018 at Unknown time Yes Unknown, Entered By History   promethazine (PHENERGAN) 25 MG tablet Take 25 mg by mouth every 6 hours as needed Give with morphine liquid  For air hunger  Yes Unknown, Entered By History   zoledronic acid (ZOMETA) 4 MG/100ML SOLN Inject 4 mg into the vein once Past Month at Unknown time Yes Unknown, Entered By History               Review of Systems:   A Comprehensive greater than 10 system review of systems was carried out.  Pertinent positives and negatives are noted above.  Otherwise negative for contributory information.           Physical Exam:   Blood pressure 106/69, pulse 91, temperature 98.1  F (36.7  C), resp. rate 11, height 1.575 m (5' 2\"), weight 41.7 kg (92 lb), SpO2 91 %.  Exam:    General:  Pleasant nad looks stated age  HEENT:  Head nc/at sclera clear PERRL O/P:  Moist mucus membranes no posterior pharyngeal erythema or exudate.  Neck is supple  Lungs: cta poor air movement, dry crackles LLL, absent in RLL  CV:  RRR no m/r/g no le edema  Abd:  S/nt/nd no r/g  Neuro:  Cn 2-12 grossly intact and strength is intact in b ue and le  Alert and oriented affect appropriate   Skin:  W/d no c/c               Data:          Lab Results   Component Value Date     10/29/2018    Lab Results   Component Value Date    CHLORIDE 101 10/29/2018    Lab Results   Component Value Date    BUN 10 10/29/2018    "   Lab Results   Component Value Date    POTASSIUM 3.8 10/29/2018    Lab Results   Component Value Date    CO2 28 10/29/2018    Lab Results   Component Value Date    CR 0.65 10/29/2018        Lab Results   Component Value Date    WBC 10.7 10/29/2018    HGB 14.0 10/29/2018    HCT 42.9 10/29/2018    MCV 98 10/29/2018     (H) 10/29/2018     Lab Results   Component Value Date     (H) 10/29/2018     Lab Results   Component Value Date    TSH 2.14 10/29/2018     Lab Results   Component Value Date    TROPI <0.015 10/29/2018     EKG:  To my personal read:  NSR without acute ST-T abnormalities, almost looks like a mild right heart strain pattern          Imaging:     Recent Results (from the past 24 hour(s))   POC US ECHO LIMITED    Impression    Limited Bedside Cardiac Ultrasound, performed and interpreted by me.   Indication: Shortness of Breath.  Thoracic views were acquired.   Image quality was satisfactory.    Findings:    Abnormal large right sided hypoechoic areas with septation throughout the right hemithorax    IMPRESSION: Moderate to large right sided pleural effusion with some areas of septation     XR Chest 2 Views    Narrative    XR CHEST 2 VIEWS   10/29/2018 6:45 AM     HISTORY: Dyspnea.     COMPARISON: 9/25/2018.    FINDINGS: Multiple bilateral pulmonary nodules, as seen previously.  These overall appear increased in size. There is a right pleural  effusion. There is new widening of the right superior mediastinum,  which may be lymph node disease. No pneumothorax. The heart size is  normal. Old bilateral rib fractures.      Impression    IMPRESSION:   1. Right pleural effusion.  2. Multiple pulmonary nodules.   US Thoracentesis    Narrative    PROCEDURE:   Ultrasound-guided thoracentesis    DATE OF PROCEDURE:   10/29/2018 8:12 AM    OPERATORS:    El Buckley MD    COMPLICATIONS:   None     SPECIMENS TO LAB:   None    MEDICATION(S) GIVEN:   1% Lidocaine SQ     CONTRAST:   None    Referenced air  kerma: 0 mGy  Fluoroscopy time: 0 minutes    ESTIMATED BLOOD LOSS:  Minimal    Pre-procedure diagnosis: Metastatic renal cancer  Post-procedure diagnosis: Same    CLINICAL HISTORY/INDICATION:   58-year-old female with symptomatic large right pleural effusion.  Presents for ultrasound-guided thoracentesis    PROCEDURE AND FINDINGS:   Following a discussion of the risks, benefits, indications and  alternatives to treatment, appropriate informed consent was obtained.  The patient was brought to the Ultrasound suite and placed upright.  The right posterior chest was prepped and draped in a sterile fashion.  A time out was performed per hospital universal protocol policy to  ensure correct patient, site and procedure to be performed.     Preliminary ultrasonography of the right  chest was performed and  demonstrates a large loculated pleural effusion.  An ultrasound image  was archived. Local anesthesia was achieved with 1% lidocaine. A  one-step catheter was advanced into the pleural space under direct  ultrasound guidance with visualization of needle tip entry into the  pleural space. A total of 950 mL of fluid was then removed from the  initial access. Repeat ultrasound shows residual loculated pleural  effusion separate from the initial access point. An ultrasound image  was archived. Local anesthesia was achieved with 1% lidocaine. A Yueh  catheter was advanced into the pleural space under direct ultrasound  guidance with visualization of needle tip and entry into the pleural  space. A total of 350 mL of fluid was then removed from the second  access site. Both catheters were removed uneventfully, and a sterile  dressings were applied.     Throughout the procedure, the patient was monitored by a radiology  nurse for heart rate, blood pressure and oxygen saturation which  remained stable. The patient tolerated the procedure well and left  interventional radiology in stable condition.       Impression    IMPRESSION:    1.  Ultrasound-guided thoracentesis, as detailed above.  2.  Total volume of fluid removed from 2 separate accesses, 1300 mL    JOHN OLSEN MD   XR Chest 1 View    Narrative    CHEST ONE VIEW October 29, 2018 8:14 AM     HISTORY: Post right thoracentesis.      Impression    IMPRESSION: Improved aeration at the right lung base compared to  earlier today. No pneumothorax. Bilateral ill-defined nodular  opacities are noted.    RUI MIRZA MD   Chest CT, IV contrast only - PE protocol    Narrative    CT CHEST PULMONARY EMBOLISM WITH CONTRAST  10/29/2018 9:24 AM     HISTORY:  Hypoxia, dyspnea.    COMPARISON: September 11, 2018    TECHNIQUE: Volumetric helical acquisition of CT images of the chest  from the lung apices to the kidneys were acquired after the  administration of 46 mL Isovue-370  IV contrast. Radiation dose for  this scan was reduced using automated exposure control, adjustment of  the mA and/or kV according to patient size, or iterative  reconstruction technique.    FINDINGS: There is no pulmonary embolism. Extensive pulmonary  metastatic disease. Representative lesion in the right lower lobe  measures 4.1 x 2.9 cm, previously 2.7 x 2.1 cm. Partially loculated  right pleural effusion increased from previous. Trace left pleural  fluid. The heart is not enlarged.  Thoracic aorta is atherosclerotic  without evidence of dissection or aneurysm. No definite pericardial  effusion.  There is no pneumothorax. Adrenal glands are normal.   Remainder of the visualized upper abdomen is unremarkable.      Impression    IMPRESSION:   1. No pulmonary embolism demonstrated.  2. No thoracic aortic dissection or aneurysm.   3. Increased metastatic disease.  4. Increased partially loculated right pleural effusion.    OLIVER MEJIA MD

## 2018-10-30 NOTE — PROGRESS NOTES
Discussed plen of care with RN.  Will send up to room information package for pt and  to review prior to insertion planned for 10/31.  Lovenox on hold.

## 2018-10-30 NOTE — PROGRESS NOTES
"   10/30/18 1630   Significant Event   Significant Event Other (see comments)  (Critical Lab Value)     Lactic acid 2.3    1631 Dr Gates pg'd: Lactic acid 2.3. Pt remains hypotensive and slightly tachy. Elevated RR and on 9PLM. Further orders?  1635 Dr Gates called: Per MD, believes the elevated lactic acid is \"multifactorial\". No new orders given, encourages high flow oxygen, continue with nebs and provide other comfort measures.  "

## 2018-10-30 NOTE — PLAN OF CARE
Problem: Patient Care Overview  Goal: Plan of Care/Patient Progress Review  Outcome: No Change  Pt continues to be hospitalized for respiratory failure. BP soft, RR 24. Was on 6L oxymask throughout most of the day. Around 1300 patient pt c/o feeling SOB, oxygen increased to 9L, attempted high flow but patient did not tolerate. Offered morphine but patient refused. Tessalon give x2. Crackles in LLL. Regular diet. Plan is for IR to place pleurex drain tomorrow. Palliative consulted, plan to discharge home with hospice. Up SBA. Discharge likely tomorrow after drain placement.

## 2018-10-30 NOTE — PLAN OF CARE
Problem: Patient Care Overview  Goal: Plan of Care/Patient Progress Review  Outcome: No Change   A/O, up SBA.  VS, afebrile, O2 sats 92% on 5L NC w/ humidity at rest most of the night, Pt desats to mid 70's with activity.Pt. placed on 5L oxymask this morning unable to maintain 02sats above 86% on NC, oxymask 6L 02sats 92% Reports SOB. B/P's soft, heart rate tachy , Resp rate: 24-28  LS course, Frequent productive cough, tan thick sputum  Denies pain  Regular diet, denies nausea  voiding adequately  BS active, passing flatus.  Discharge: TBD

## 2018-10-30 NOTE — CONSULTS
"Care Transition Initial Assessment -   Reason For Consult: discharge planning  Met with: Patient and Family    Active Problems:    Acute on chronic respiratory failure with hypoxia (H)       DATA  Lives With: child(bryan), adult, spouse  Living Arrangements: house  Description of Support System: Supportive, Involved  Who is your support system?: , Children, Sibling(s)  Support Assessment: Adequate family and caregiver support, Adequate social supports. Pt's spouse is retired and able to assist   Identified issues/concerns regarding health management: PT has Metastatic renal cell carcinoma with lung Mets.         Resources List: Hospice   hospice meeting for 10/31 @ 1300      Quality Of Family Relationships: supportive     ASSESSMENT  Cognitive Status:  awake, alert and oriented  Concerns to be addressed: PT is retired and home with support. Pt will need a pleurex cath placed tomorrow before going home on Hospice. This is a  Big adjustment for pt.. PT lives in a multi level home. The main level DOES NOT have a bathroom. PT will need to ambulate 6-8 steps to get the upper level where her bedroom and bathroom are.   Pt does have an adjustable bed, however she may need a Hospital bed for the main level with a commode. Pt has home o2 through [facility] and has been on 3L and at time now 6L   Pt prior to admission was independent with ALD\"s at home      PLAN  Hospice meeting for tomorrow with  Home Care @9:30  Spouse will be here and bring o2 tank from home for transport      "

## 2018-10-30 NOTE — PROGRESS NOTES
[facility]  Hospitalist Progress Note  Sherri Gates MD 10/30/2018    Reason for Stay (Diagnosis): acute on chronic hypoxic respiratory failure         Assessment and Plan:      Summary of Stay: Meggan Law is a 58 year old female with a history of hypertension, multinodular goiter with hyperthyroidism, COPD, epilepsy, and renal cell carcinoma with heavy lung burden from metastatic disease further complicated by recurrent pleural effusion(malignant), chronic hypoxic respiratory failure on nasal cannula oxygen at 3 L, admitted on 10/29/2018 with complaints of increasing shortness of breath and a sense that her throat was closing up.     She had extensive and appropriate workup in the emergency room including a chest x-ray that showed recurrence of a pleural effusion (most recently tapped 3 days ago), negative EKG and negative troponin, she underwent thoracentesis of approximately 1400 cc and remained hypoxic and still short of breath prompting CT scan of the chest which was negative for pulmonary embolism but did show progressive metastatic lung disease.     Regarding her throat and sensation that it was closing up, bedside laryngoscopy was performed by Dr. Jane showed normal pharyngeal and laryngeal structures without evidence of angioedema or vocal cord abnormality.     Overnight into 10/30/18 she has again become progressively hypoxic and more sob.  I discussed the case with Dr Cardona who would recommend pleurex catheter instead of talc pleurodesis.  Tried to complete today but rec'd enox this am (ppx dosing) and needs to be off for 24 ours prior to this procedure.     Discussed with Dr Ng, Dr Cardona, patient, and .  She would like to pursue hospice now, and would like to return home as soon as possible after pleurex catheter in place.       Problem List:   1. Acute on chronic hypoxic respiratory failure: Sats were in the 85% range on 3 L by nasal cannula.  She states she  normally saturates at about 94% on her 3 L.   I suspect this is multifactorial in etiology including progressive metastatic disease, recurrent pleural effusion and associated atelectasis. At this time will cont with supplemental O2 and air hunger medications as outlined by palliative care.  Furthermore I've asked that her sat alarm be decreased to 85 % to allow for at least some sleep.  Repeat cxr today shows reaccumulation of malignant pleural effusion.  2. Recurrent malignant pleural effusion: Last tap 3 days prior to admission and then needed to 1400 cc removed today.  She is required taps every 3-4 days which seems unsustainable to me.  and now is accumulating even faster (24-48 hours), she is a poor candidate for pleurodesis per Dr Cardona and so will plan for pleurex catheter placement tomorrow.  3. Throat tightening: Unclear etiology.  She was started on a new chemotherapeutic agent is an oral tablet that goes by the name of inlyta sometime last week which can cause dysphonia but has no association with angioedema, she attributes it to taking promethazine that she was prescribed by the palliative care service last week-and on review of UTD anxiety and angioedema are listed as side effects.  This actually seems like the more likely culprit.  For now will hold both.  I will add promethazine to her allergy list, but I'll hold off on the inlyta.  That sensation has resolved  4. Htn:  but BPs on the low side, will discontinue amlodipine  5. Epilepsy:  Cont home lamotrigine and vimpat  6. Metastatic renal cell cancer: appreciate Dr Ng and EUGENE Jose-plan is to transition to home  hospice  7. Asthma/copd:  ? Exacerbation-poor air movement but no wheezing.  duonebs qid with prn albuterol, no indication for steroids  8. Hyperthyroidism:  Cont methimazole  9. Subacute and constant sternal pain: discussed with radiology-no e/o metastatic disease, suspect MSK due to chronic coughing.  Trial icy hot  "patch  10. Chronic pain in setting of metastatic RCC-home morphine dosing  11. Prognosis:  Terminal.  She understands that now.   does as well, palliative care to help with transition to home hospice  DVT Prophylaxis: Enoxaparin (Lovenox) SQ  Code Status: DNR/I  Dispo: hopefully home tomorrow after pleurex cath placement with home hospice            Interval History (Subjective):      tachypnic and sob but otherwise pleasant.  Breathing improves with increased supplemental O2.  Reports worsening sob overnight, denies any significant pain. Is able to eat about half of her meals. No n/v                  Physical Exam:      Last Vital Signs:  BP 98/60 (BP Location: Left arm)  Pulse 97  Temp 96.3  F (35.7  C) (Axillary)  Resp 24  Ht 1.575 m (5' 2\")  Wt 40 kg (88 lb 1.6 oz)  SpO2 90%  BMI 16.11 kg/m2    Pleasant sitting up in bed, moderately tachypniec head nc/at sclera clear, very thin, lung absent breath sounds about 1/3 of the way up on the right, fine crackles at left base rrr no mrg no le edema skin w/d no cyanosis or clubbing alert and oriented affect appropriate escalera            Medications:      All current medications were reviewed with changes reflected in problem list.         Data:      All new lab and imaging data was reviewed.   Labs:    Recent Labs  Lab 10/30/18  0630      POTASSIUM 4.8   CHLORIDE 98   CO2 31   ANIONGAP 4   *   BUN 14   CR 0.85   GFRESTIMATED 68   GFRESTBLACK 82   GIO 8.0*       Recent Labs  Lab 10/30/18  0630   WBC 13.4*   HGB 14.5   HCT 44.8   MCV 98   *      Imaging:       "

## 2018-10-30 NOTE — PROVIDER NOTIFICATION
"1939 Admitting provider pg'd: Pt c/o new onset L sided chest pain 9/10. EKG? Further orders?    1947 Received call from Dr. Donato. Per MD, doesn't suspect cardiac d/t pt describing pain as \"sharp.\" Repeat EKG ordered. MD noted pt received EKG in ER and MD suspects there's \"significant disease burden\" that could be causing pain.    2027 Dr. Donato pg'd with EKG results.  "

## 2018-10-30 NOTE — PLAN OF CARE
"Problem: Patient Care Overview  Goal: Plan of Care/Patient Progress Review  Outcome: No Change  Pt A&O, up in room independently.  VSS, afebrile & O2 weaned to 3LPM with sats in low-90's at rest.  Pt desats significantly to mid-80's with activity. Reports SOB.  LS clear with bibasiliar crackles, diminished on R side.  Frequent nonproductive cough, c/o some mid chest discomfort \"from coughing.\"  New complaint this evening of L-sided chest pain under breast.  MD notified, EKG done and Norco given with relief. No further orders.  Regular diet, fair appetite. IV saline locked, voiding adequately.  Thoracic surgery following, pt states she \"needs to talk to her \" before moving forward with drain placement.        "

## 2018-10-30 NOTE — PROGRESS NOTES
THORACIC & FOREGUT SURGERY    S:  Increased shortness of breath overnight and did not sleep well, for this reason.  She was seen at bedside with tachypnea and wearing oxygen mask, requiring increased oxygen support.  Denies nausea, vomiting, diarrhea, constipation, fever or chills.  Appetite poor.    O:  Vitals:    10/30/18 0329 10/30/18 0637 10/30/18 0728 10/30/18 0837   BP: 91/53   90/57   BP Location: Left arm   Left arm   Pulse: 87 95  97   Resp: 26 24  24   Temp: 97  F (36.1  C)   97.4  F (36.3  C)   TempSrc: Oral   Oral   SpO2: 92% 92% 91% 92%   Weight:  40 kg (88 lb 1.6 oz)     Height:           A&Ox3, NAD  Tachypnea, crackles in the left side, decreased breath sounds right side  RRR  Soft, NDNT  Distal extremities warm.   No calf tenderness.    A/P: Meggan Law is a 58 year old female with recurrent malignant left pleural effusion:  -S/p thoracentesis 10/29 with increased SOB and oxygen requirement today  -IR to place tunneled catheter today, if schedule allows. If not, then catheter tomorrow.      Discussed plan with Dr. Gates and Dr. Cardona.      Taina Gandara PA-C  Thoracic Surgery

## 2018-10-30 NOTE — PROGRESS NOTES
Oncology/Hematology Follow Up Note:    Assessment and Plan:  Meggan Law is a 58 year old female patient, with metastatic renal cell carcinoma- admitted with worsening shortness of breath.    1. Metastatic Renal Cell Carcinoma:  -Diagnosed in August 2017 when she presented with large renal mass and multiple primary nodules.  -Since then she has been on various chemotherapies including immunotherapy and most recently has been on Cabozantinib at 60 mg p.o. Daily.  - Was see at the [facility], and took a few days of axitinib.  -I have seen this patient only once as she was previously followed in our [place] office.  She has seen palliative care as an outpatient.  We started having a discussion of quality and quantity of life a week ago when I first met her and today we reinforced that discussion.  Patient values quality of life and would like to start hospice care at the earliest.  This is very reasonable considering her declining pulmonary status and progression of disease as noted on CT scan.    PLAN: Discontinue all chemotherapy in favor of palliation and hospice.    2. Dyspnea  -Multifactorial from pulmonary disease, pleural-based disease, recurrent pleural effusion with progression of all of the above.  -Greatly appreciate thoracic surgery's input.    PLAN: Pleurex catheter to be placed tomorrow by interventional radiology and largest fluid pocket.    3. Palliative care  -Patient was seen as an outpatient but I would favor inpatient consult due to many questions about medications and patient and  needs support.    PLAN: Palliative care c/s.    4. DNR/ DNI.    D/w Dr. Gates, greatly appreciate her collaborative and kind care.    Time: 35 minutes with patient , 30 minutes in counseling and coordination of care      Subjective:    Feeling worse, breathing worse, tired of all this. Values quality of life more than anything.She wants to go home and spend time with her family.  She is specifically very  "concerned about shortness of breath and dyspnea and is fearful of that and would like a support around that symptom    Scheduled Medications:  Reviewed active medications    Labs:  CBC RESULTS:   Recent Labs   Lab Test  10/30/18   0630  10/29/18   1510  10/29/18   0608  08/11/18   1501   WBC  13.4*   --   10.7  9.0   HGB  14.5   --   14.0  15.2   HCT  44.8   --   42.9  45.8   MCV  98   --   98  95   PLT  493*  501*  537*  437       CMP  Recent Labs  Lab 10/30/18  0630 10/29/18  1510 10/29/18  0721 10/29/18  0608     --   --  136   POTASSIUM 4.8  --  3.8 Canceled, Test credited   CHLORIDE 98  --   --  101   CO2 31  --   --  28   ANIONGAP 4  --   --  7   *  --   --  105*   BUN 14  --   --  10   CR 0.85 0.58  --  0.65   GFRESTIMATED 68 >90  --  0*   GFRESTBLACK 82 >90  --  0*   GIO 8.0*  --   --  8.8       INR  Recent Labs  Lab 10/30/18  1201   INR 0.94       Objective/Physical Exam:  Blood pressure 90/57, pulse 97, temperature 97.4  F (36.3  C), temperature source Oral, resp. rate 24, height 1.575 m (5' 2\"), weight 40 kg (88 lb 1.6 oz), SpO2 91 %.  General appearance:alert, oriented, no acute distress  HEENT:sclera anicteric, no pallor, no thrush or mucositis.  Lungs: rhonchi, coarse BS, decreased BS  Abdomen: soft, NT, ND , BS normal  Ext: no edema or rashes    Zach Ng MD  [facility]  10/30/2018 12:37 PM      "

## 2018-10-30 NOTE — CONSULTS
"[facility]    Palliative Care Consultation   Text Page    Date of Admission:  10/29/2018    Assessment & Plan   Meggan Law is a 58 year old female who was admitted on 10/29/2018. I was asked to see the patient for hospice consult.    Recommendations:  1.Decisional Capacity -  Intact. Patient has an advance directive dated 9/22/2018.  If she becomes unable to demonstrate decisional capacity, Adam Lynn, spouse is her primary Health Care Agent.  Alternate is Rubén Law, son.  Health Care Directive 9/22/2017.   2. Pain- In bilateral lateral chest radiating under bra line to substernal area.  Possibly musculoskeletal with cough but new, intermittent, \"stabbing\" pain on right feels like previously broken rib.   Cough management as below.  Continue MS Contin 15mg BID as PTA as patient tolerates well.  She is concerned about drowsiness with liquid morphine 2.5mg as she used this at bedtime PTA, trial dilaudid 1mg q 2 hours PRN. Lidocaine patch available for \"shooting pain.\"  Meggan will not take over the counter medications noting they are not strong enough for her.  3. Dyspnea-Reported well managed with 6L per oxymask currently.  Patient also notes anxiety with flare of dyspnea.  Planning for Pleurx catheter placement tomorrow.  Patient expresses concern of sedating medications.  \"I don't want to sleep the rest of my life away.  Will trial dilaudid 1mg PRN and/or ativan 0.25mg PRN for anxiety component of dyspnea.  We discussed possibility of tolerating these medications and if they do cause drowsiness, she will know how she tolerates should she choose drowsiness over dyspnea at some point if dyspnea cannot be managed in other ways.  Bedside fan ordered.    4. Cough- May be aggravated by recurring pleural effusion.  Pleurx may help or aggravate.  Opioids most potent cough suppressant.  Patient is not wanting to trial robitussin AC as she thought it was over the counter.  Discussed " "the \"AC\", she will continue Tessalon Pearls as PTA.  Humidification may be helpful?  5. Spiritual Care- Oriented to Spiritual Health as part of Palliative Care team. Appreciate care of Socrates Sparrow. M.Div. PAtient felt well supported by his visit.   4. Care Planning- Appreciate SW and Care Coordinator, planning for Hospice meeting tomorrow at 0930 followed by Pleurx placement prior to discharge.     Goal of Care:DNR/DNI, Comfort plan at discharge, would like to preserve alertness as much as possible and avoid sedating interventions for symptom management.    Disease Process/es & Symptoms:  Meggan Law is a 58 year old patient admitted with symptoms of increasing SOB and sensation of \"closing throat.\" A bedside laryngoscopy in ED showed normal pharyngeal and ;aryngeal structures, Promethazine was newly prescribed and is now listed as an allergy.  Symptom has resolved.  Shortness  Of breath is in setting of acute on chronic respiratory failure related to metastatic renal cancer to lung with recurrent pleural effusion, as well as possible COPD exacerbation.  She had 1400cc removed by thoracentesis on the day of admission and is planning for a Pleurx drain tomorrow.        This is in the setting of Metastatic renal cell carcinoma diagnosed in 8/2017, she is followed [facility] and has been seen at [facility].   With declining pulmonary status and progression of disease despite various cancer treatments, she has discussed with Oncologist today and decided to start hospice care.  Other co-morbidities include HTN, COPD, multinodular goiter with hyperthyroidism and epilepsy. She has had no recent hospitalizations, weight loss or loss of function but notes increasing need for thoracentesis to every few days and increasing time spent sleeping during the day.    Findings & plan of care discussed with: Nursing, and SW.  Follow-up plan from palliative team: Will follow-up tomorrow in support of symptom " "management and transition to hospice care.   Thank you for involving us in the patient's care.   Shannon Jose APRN, CNS, CNP  Pain Management and Palliative Care  [facility]  Pgr: [phone]      Time Spent on this Encounter   I spent  95 minutes total, >50% in assessment of the patient, counseling and discussion with the patient and family as documented in this note as well as coordination of care and discussion with the health care team.    Reason for Consult   Reason for consult: I was asked by Dr. Gates to evaluate this patient for Patient and family support  With transition to Hospice Care.    Primary Care Physician   Brodie Parker    Chief Complaint   SOB and cough    History is obtained from the patient and patient's brother, Dylan Cook    History of Present Illness   Meggan Law is a 58 year old female who presents with symptoms of increasing SOB and sensation of \"closing throat.\" A bedside laryngoscopy in ED showed normal pharyngeal and ;aryngeal structures, Promethazine was newly prescribed and is now listed as an allergy.  Symptom has resolved.  Shortness  Of breath is in setting of acute on chronic respiratory failure related to metastatic renal cancer to lung with recurrent pleural effusion, as well as possible COPD exacerbation.  She had 1400cc removed by thoracentesis on the day of admission and is planning for a Pleurx drain tomorrow.        This is in the setting of Metastatic renal cell carcinoma diagnosed in 8/2017, she is [facility] and has been seen at [facility].   With declining pulmonary status and progression of disease despite various cancer treatments, she has discussed with Oncologist today and decided to start hospice care.  Other co-morbidities include HTN, COPD, multinodular goiter with hyperthyroidism and epilepsy. She has had no recent hospitalizations, weight loss or loss of function but notes increasing need for thoracentesis to every few " "days and increasing time spent sleeping during the day.    The following symptoms are noted by patient as concerning to her quality of life.  Pain - In Bilateral lateral chest wall radiating up under sternum.  shart and stabbing intermittently on right, similar to previous pain with rib fracture.  Managed chronically on MSContin 15mg BID.   Dyspnea - with re current pleural effusion, worse and unrelieved by rest this admission.   Anorexia - NOt a concern to patient but she notes it is a concern to her son and .  She states she always feels hungry for her son's cooking.   Fatigue -  Depressive symptoms - especially over the past 2 weeks.  She found comfort in  visit.  We discussed options for antidepressant if she was interested, she was happy for information, but did not want to increase \"pill burden.\"  She also found comfort knowing that hospe was there to support family as well as her.  She is most concerned about her son.    Anxiety- with flare of dyspnea  Drowsiness - sleeping much of the day at home.  She is hopeful to limit naps to once a day.     Decision-Making & Goals of Care:  Discussed on October 30, 2018 with Shannon Jose APRN, CNS, CNP:  Met with patient and brother Dylan at bedside.  Patient is somewhat anxious and overwhelmed but is able to speak directly about her current illness and plans to enroll in hospice as life is short.  She tells me she is most concerned about sleeping her life away and that she has found herself sleeping more and more during the day at home.  She wants to avoid sedating medications, we discussed medications for dyspnea and how they may be used in acute decline or chronically and non-medications for dyspnea, when she may want to choose a medication despite possible drowsiness and the natural process of decline from respiratory failure that eventually includes somnolence, even without medications.   Patient is most concerned about her son Rubén and how he will " manage he illness, and decline and eventual passing.  Dylan is reassuring he will continue to be a support to Rubén.  We discussed Hospice as a resource to family even after passing.    We reviewed Pleurx catheter and use with pamphlet she had already been given plus a picture from Pleurx website.      Other important issues for patient are that over the counter medications do not work for her and she is hesitant to add more medications to her regime, although acknowledges she will want to review current medications for those she may not need for comfort.     Patient has decision-making capacity Intact  Patient has a Health Care Agent named in a legal Advance Directive document dated 09/22/2017. See name(s) and contact information in Code/ACP/Advance Care Planning Activity Tab.  There is no POLST (Physician orders for life-sustaining treatment) form on file for this patient.   Code Status: Do not resusitate / Do not intubate     Past Medical History   I have reviewed this patient's medical history and updated it with pertinent information if needed.   Past Medical History:   Diagnosis Date     Hypertension      Malignant pleural effusion     requiring thoracentesis every 3-4 days, plus loculated component     Multinodular goiter     with hyperthyroidism, on methimezole     Renal cell cancer (H)     metastatic with heavy lung burden/malignant pleural effusion      Seizures (H)        Past Surgical History   I have reviewed this patient's surgical history and updated it with pertinent information if needed.  Past Surgical History:   Procedure Laterality Date     APPENDECTOMY         Prior to Admission Medications   Prior to Admission Medications   Prescriptions Last Dose Informant Patient Reported? Taking?   Axitinib (INLYTA PO) 10/28/2018 at Unknown time  Yes Yes   Sig: Take 5 mg by mouth every 12 hours   Benzonatate (TESSALON PERLES PO) 10/28/2018 at Unknown time  Yes Yes   Sig: Take 100 mg by mouth 3 times daily as  needed    HYDROcodone-acetaminophen (NORCO) 5-325 MG per tablet Past Week at Unknown time  Yes Yes   Sig: Take 1-2 tablets by mouth every 4 hours as needed for pain    Lacosamide (VIMPAT) 100 MG TABS tablet 10/28/2018 at Unknown time  Yes Yes   Sig: Take 100 mg by mouth every morning   LamoTRIgine (LAMICTAL) 300 MG TB24 10/28/2018 at Unknown time  Yes Yes   Sig: Take 1 tablet by mouth daily   MethIMAzole (TAPAZOLE PO) 10/28/2018 at Unknown time  Yes Yes   Sig: Take 5 mg by mouth every evening    Multiple Vitamins-Minerals (MULTIVITAMIN ADULT) TABS 10/28/2018 at Unknown time  Yes Yes   Sig: Take 1 tablet by mouth daily   amLODIPine (NORVASC) 2.5 MG tablet 10/28/2018 at Unknown time  Yes Yes   Sig: Take 2.5 mg by mouth every evening   calcium carbonate (OS- MG [place]. [place]) 600 MG tablet 10/28/2018 at Unknown time  Yes Yes   Sig: Take 1 tablet by mouth 2 times daily    guaiFENesin-codeine (ROBITUSSIN AC) 100-10 MG/5ML SOLN solution 10/28/2018 at Unknown time  Yes Yes   Sig: Take 1 tsp. by mouth every 4 hours as needed    lacosamide (VIMPAT) 200 MG TABS tablet 10/28/2018 at Unknown time  Yes Yes   Sig: Take 200 mg by mouth every evening   levETIRAcetam (KEPPRA) 1000 MG TABS 10/28/2018 at Unknown time  Yes Yes   Sig: Take 1 tablet by mouth 2 times daily   morphine (MS CONTIN) 15 MG 12 hr tablet 10/28/2018 at Unknown time  Yes Yes   Sig: Take 15 mg by mouth every 12 hours   morphine 10 MG/5ML solution   Yes Yes   Sig: Take 2.5 mg by mouth every 4 hours as needed (air hunger) Give with phenergan   promethazine (PHENERGAN) 25 MG tablet   Yes Yes   Sig: Take 25 mg by mouth every 6 hours as needed Give with morphine liquid  For air hunger   zoledronic acid (ZOMETA) 4 MG/100ML SOLN Past Month at Unknown time  Yes Yes   Sig: Inject 4 mg into the vein once      Facility-Administered Medications: None     Allergies   Allergies   Allergen Reactions     Promethazine Other (See Comments)     sob       Social History   I have  updated and reviewed the following Social History Narrative:   Social History     Social History Narrative      Living situation: With  in [place].  Family system: Spouse, adult son Rubén and 5 siblings.  Functional status (needs help with ADLs or IADLs): independent with most ADL's   Employment/education: no working  Use of community resources: unknown  Activities/interests: Family and their cooking.   History of substance use/abuse:unknown  Taoism affiliation: Jehovah's witness  Involvement in jorge l community: no    Family History   I have reviewed this patient's family history and updated it with pertinent information if needed.   Family History   Problem Relation Age of Onset     Bronchitis Mother      Bleeding Disorder Mother      Heart Failure Father      Coronary Artery Disease Father      Bronchitis Father      Schizophrenia Sister        Review of Systems   The 10 point Review of Systems is negative other than noted in the HPI or here.     Palliative Symptom Review (0=no symptom/no concern, 1=mild, 2=moderate, 3=severe):      Pain: 1-mild      Fatigue: 2-moderate      Nausea: 0-none      Constipation: 0-none      Diarrhea: 0-none      Depressive Symptoms: 2-moderate      Anxiety: 1-mild      Drowsiness: 1-mild      Poor Appetite: 2-moderate      Shortness of Breath: 0-3      Insomnia: 0-none      Other:cough 2-moderate    Physical Exam   Temp:  [95.6  F (35.3  C)-97.4  F (36.3  C)] 97.4  F (36.3  C)  Pulse:  [87-97] 97  Heart Rate:  [] 97  Resp:  [24-28] 26  BP: ()/(52-63) 98/59  SpO2:  [90 %-96 %] 91 %  88 lbs 1.6 oz  GEN:  Alert, oriented x 3, appears comfortable, mildly anxious.  HEENT:  Normocephalic/atraumatic, no scleral icterus, no nasal discharge, mouth moist.  CV:  RRR, S1, S2; no murmurs or other irregularities noted.  +3 DP/PT pulses bilatererally; no edema BLE.  RESP:  Coarse crackles on left, no breath sounds on the right.  Symmetric chest rise on inhalation noted.  tachypnea.  respiratory effort.  ABD:  Rounded, soft, non-tender/non-distended.  +BS  EXT:  Edema & pulses as noted above.  CMS intact x 4.     M/S:   Evidence of muscle wasting to long limb muscles/    SKIN:  Dry to touch, no exanthems noted in the visualized areas.    NEURO: Symmetric strength +4/4.  Generalized weakness.   Psych:  Normal affect, mildly anxious. Cooperative, difficulty following conversation, needing reassurance of previous discussions.     Delirium Screen/CAM:  Delirium = (#1 and #2 = YES) + (#3 and/or #4)   1) Acute onset and fluctuating course:   No   (acute change in mental status from baseline over last 24 hours)  2) Inattention:   No   (difficulty focusing, distractible, can't follow conversation)  3) Disorganized thinking:   Not applicable   (score only if #1 and #2 are YES)  (rambling/irrelevant conversation, unclear/illogical thoughts, inconsistency)  4) Altered level of consciousness:   Not applicable   (score only if #1 and #2 are YES)  (other than alert, calm, cooperative)    Delirium/CAM score: 0/4  Interpretation:  1)  Delirium:  Absent    Data   Results for orders placed or performed during the hospital encounter of 10/29/18 (from the past 24 hour(s))   EKG 12-lead, tracing only   Result Value Ref Range    Interpretation ECG Click View Image link to view waveform and result    Basic metabolic panel   Result Value Ref Range    Sodium 133 133 - 144 mmol/L    Potassium 4.8 3.4 - 5.3 mmol/L    Chloride 98 94 - 109 mmol/L    Carbon Dioxide 31 20 - 32 mmol/L    Anion Gap 4 3 - 14 mmol/L    Glucose 109 (H) 70 - 99 mg/dL    Urea Nitrogen 14 7 - 30 mg/dL    Creatinine 0.85 0.52 - 1.04 mg/dL    GFR Estimate 68 >60 mL/min/1.7m2    GFR Estimate If Black 82 >60 mL/min/1.7m2    Calcium 8.0 (L) 8.5 - 10.1 mg/dL   CBC with platelets   Result Value Ref Range    WBC 13.4 (H) 4.0 - 11.0 10e9/L    RBC Count 4.57 3.8 - 5.2 10e12/L    Hemoglobin 14.5 11.7 - 15.7 g/dL    Hematocrit 44.8 35.0 - 47.0 %    MCV 98 78 -  100 fl    MCH 31.7 26.5 - 33.0 pg    MCHC 32.4 31.5 - 36.5 g/dL    RDW 13.9 10.0 - 15.0 %    Platelet Count 493 (H) 150 - 450 10e9/L   XR Chest 2 Views    Narrative    XR CHEST 2 VW 10/30/2018 10:03 AM    HISTORY: Respiratory failure.    COMPARISON: Chest CT, 10/29/2018    FINDINGS: Moderate right pleural effusion with right basilar  consolidation. Bilateral patchy airspace opacity is otherwise not  significantly changed. No pneumothorax.      Impression    IMPRESSION: Moderate right pleural effusion, increased from yesterday.  Patchy bilateral airspace opacity is otherwise similar.    BRIE GORDON MD   INR   Result Value Ref Range    INR 0.94 0.86 - 1.14   Social Work IP Consult    Narrative    White, Corinne C, LSW     10/30/2018  2:31 PM  Care Transition Initial Assessment - SW  Reason For Consult: discharge planning  Met with: Patient and Family    Active Problems:    Acute on chronic respiratory failure with hypoxia (H)       DATA  Lives With: child(bryan), adult, spouse  Living Arrangements: house  Description of Support System: Supportive, Involved  Who is your support system?: , Children, Sibling(s)  Support Assessment: Adequate family and caregiver support,   Adequate social supports. Pt's spouse is retired and able to   assist   Identified issues/concerns regarding health management: PT has   Metastatic renal cell carcinoma with lung Mets.         Resources List: Hospice  [facility] hospice meeting for 10/31 @ 1300      Quality Of Family Relationships: supportive     ASSESSMENT  Cognitive Status:  awake, alert and oriented  Concerns to be addressed: PT is retired and home with support. Pt   will need a pleurex cath placed tomorrow before going home on   Hospice. This is a  Big adjustment for pt.. PT lives in a multi   level home. The main level DOES NOT have a bathroom. PT will need   to ambulate 6-8 steps to get the upper level where her bedroom   and bathroom are.   Pt does have an adjustable bed, however  "she may need a Hospital   bed for the main level with a commode. Pt [facility] o2 [facility]   [facility] and has been on 3L and at time now 6L   Pt prior to admission was independent with ALD\"s at home      PLAN  Hospice meeting for tomorrow with  Home Care @9:30  Spouse will be here and bring o2 tank from home for transport       Lactic acid level STAT for sepsis protocol   Result Value Ref Range    Lactate for Sepsis Protocol 2.3 (H) 0.7 - 2.0 mmol/L     "

## 2018-10-30 NOTE — PROGRESS NOTES
I placed [employer] Pleurx Drain kit form on front of chart for IR MD that is inserting drain to sign/fill out.  Lani SPEARS CTS [id number]

## 2018-10-30 NOTE — PROGRESS NOTES
RT: Placed patient on HFNC 30L, 70%. Patient was uncomfortable with the high flow. She asked to be switched back to her oxymask.    She's currently wearing 9L oxymask, SPO2 91%.    Will continue to follow and assess.

## 2018-10-30 NOTE — PROGRESS NOTES
I spoke with pt and her brother about hospice meeting at 9:30am on 10/31.  I also updated pt's  over phone and they are all agreeable with timing of hospice meeting at 9:30am and IR procedure at 12:30 on 10/31.  Lani SPEARS CTS [id number]

## 2018-10-30 NOTE — PROGRESS NOTES
[facility]    Sepsis Evaluation Progress Note    Date of Service: 10/30/2018    I was called for Meggan Law due to abnormal vital signs triggering the Sepsis SIRS screening alert. She is not known to have an infection.     Physical Exam    Vital Signs:  Temp: 97.4  F (36.3  C) Temp src: Axillary BP: 98/59 Pulse: 97 Heart Rate: 97 Resp: 26 SpO2: 91 % O2 Device: Oxymask Oxygen Delivery: 10 LPM    Lab:  Lactic Acid   Date Value Ref Range Status   10/29/2018 1.7 0.7 - 2.1 mmol/L Final     Lactate for Sepsis Protocol   Date Value Ref Range Status   10/30/2018 2.3 (H) 0.7 - 2.0 mmol/L Final     Comment:     Significant value called to and read back by  JANETT BARBOUR(RN) IN [facility] ON 10.30.2018 AT 1628 BY VL           Assessment and Plan    The SIRS and exam findings are likely due to hyoxia, there is no sign of sepsis at this time.    Disposition: The patient will remain on the current unit. We will continue to monitor this patient closely.    Sherri Gates MD

## 2018-10-30 NOTE — PROGRESS NOTES
"SPIRITUAL HEALTH SERVICES  SPIRITUAL ASSESSMENT Progress Note  [facility] 5th floor    PRIMARY FOCUS:     Goals of care    Symptom/pain management    Emotional/spiritual/Lutheran distress    Support for coping    ILLNESS CIRCUMSTANCES:   Reviewed documentation. Reflective conversation shared with pt, Meggan, which integrated elements of illness and family narratives.     Context of Serious Illness/Symptom(s) - Meggan shares that she was diagnosed with kidney cancer that had metastasized last March. \"I did great until July and things got bad with my breathing. Then this weekend they got even worse and I can't keep doing this, I'm done.\"      Resources for Support - Meggan names her , Arvind; son, Rubén; brother, Dylan and his wife So. Also her aurea.     DISTRESS:     Emotional/Existential/Relational Distress - Meggan was tearful as she expressed, \"I just found out that I need to go on to hospice.\" As she reflected on this she expresses worry about her son and . She notes, \"They just tend to ignore all of this, but I can't do another weekend like this last one.\"     Spiritual/Nondenominational Distress - Meggan wonders \"If I'm so assured that I'm going to heaven, why am I afraid to die.\" Provided supportive listening as she reflected on her aurea and she wondered if maybe her fear of leaving her family was overshadowing her assurance of heaven.    Social/Cultural/Economic Distress - Not discussed.     SPIRITUAL/Adventist COPING:     Jehovah's witness/Aurea - Denominational.     Spiritual Practice(s) - Prayer.     Emotional/Existential/Relational Connections - Pt shares that she is very close to her family.   GOALS OF CARE:    Goals of Care - \"I'm going on hospice, they just decided.\"     Meaning/Sense-Making - Meggan reflected on the close relationship she has to her family, especially her son, Rubén. She was then visited by her brother Oc and asked that our visit come to an end.     PLAN: Will f/u again tomorrow " per her request for ongoing emotional/spiritual support. I and the other chaplains remain available per pt/family/staff need or request.     Socrates Sparrow M.Div.  Staff   Pager [phone]

## 2018-10-31 NOTE — PROGRESS NOTES
[facility]  Hospitalist Progress Note  Name: Meggan Law    MRN: [id number]  Provider:  Katlin Roy MD  10/31/18    Initial presenting complaint/issue to hospital (Diagnosis): acute on chronic hypoxic resp failure.         Assessment and Plan:      Summary of Stay: Meggan Law is a 58 year old female admitted on 10/29/2018 with a history of hypertension, multinodular goiter with hyperthyroidism, COPD, epilepsy, and renal cell carcinoma with heavy lung burden from metastatic disease further complicated by recurrent pleural effusion(malignant), chronic hypoxic respiratory failure on nasal cannula oxygen at 3 L, admitted on 10/29/2018 with complaints of increasing shortness of breath and a sense that her throat was closing up.      She had extensive and appropriate workup in the emergency room including a chest x-ray that showed recurrence of a pleural effusion (most recently tapped 3 days ago), negative EKG and negative troponin, she underwent thoracentesis of approximately 1400 cc and remained hypoxic and still short of breath prompting CT scan of the chest which was negative for pulmonary embolism but did show progressive metastatic lung disease.      Regarding her throat and sensation that it was closing up, bedside laryngoscopy was performed by Dr. Jane showed normal pharyngeal and laryngeal structures without evidence of angioedema or vocal cord abnormality.      Overnight into 10/30/18 she has again become progressively hypoxic and more sob.  Dr Gates discussed the case with Dr Cardona who would recommend pleurex catheter instead of talc pleurodesis.       Discussed with Dr Ng and patient.  She would like to pursue hospice now, and would like to return home as soon as possible after pleurex catheter in place.         Problem List:   1. Acute on chronic hypoxic respiratory failure: This am worsening SOB and on BiPAP. Discussed with patient. She wants the PleurX and possible  discharge if stable on hospice.  2. Recurrent malignant pleural effusion: Last tap 3 days prior to admission and then needed to 1400 cc removed.  She is required taps every 3-4 days which seems unsustainable to me.  and now is accumulating even faster (24-48 hours), she is a poor candidate for pleurodesis per Dr Cardona and so will plan for pleurex catheter placement .  3. Throat tightening: Unclear etiology.  She was started on a new chemotherapeutic agent is an oral tablet that goes by the name of inlyta sometime last week which can cause dysphonia but has no association with angioedema, she attributes it to taking promethazine that she was prescribed by the palliative care service last week-and on review of UTD anxiety and angioedema are listed as side effects.  This actually seems like the more likely culprit.  For now will hold both.  I will add promethazine to her allergy list, but I'll hold off on the inlyta.  That sensation has resolved  4. Htn:  but BPs on the low side, will discontinue amlodipine  5. Epilepsy:  Cont home lamotrigine and vimpat  6. Metastatic renal cell cancer: appreciate Dr Ng and NP Shannon Jose-plan is to transition to home  hospice  7. Asthma/copd:  ? Exacerbation-poor air movement but no wheezing.  duonebs qid with prn albuterol, no indication for steroids  8. Hyperthyroidism:  Cont methimazole  9. Subacute and constant sternal pain: discussed with radiology-no e/o metastatic disease, suspect MSK due to chronic coughing.  Trial icy hot patch  10. Chronic pain in setting of metastatic RCC-home morphine dosing  11. Prognosis:  Terminal.  She understands that now.   does as well, palliative care to help with transition to home hospice    DVT Prophylaxis: PCD's.  Code Status: DNR/I  Dispo: hopefully home tomorrow after pleurex cath placement with home hospice              Interval History:        Worsening SOB overnight and on BiPAP this am.                  Physical Exam:       Last Vital Signs:  Temp: 96  F (35.6  C) Temp src: Axillary BP: 117/77   Heart Rate: 96 Resp: (!) 32 SpO2: 95 % O2 Device: High Flow Nasal Cannula (HFNC) Oxygen Delivery: Other (Comments) (40 LPM)    Intake/Output Summary (Last 24 hours) at 10/31/18 1335  Last data filed at 10/30/18 1900   Gross per 24 hour   Intake              450 ml   Output               25 ml   Net              425 ml     I/O last 3 completed shifts:  In: 950 [P.O.:950]  Out: 25 [Emesis/NG output:25]  Vitals:    10/29/18 0603 10/30/18 0637   Weight: 41.7 kg (92 lb) 40 kg (88 lb 1.6 oz)       Gen - Awake, frail in respiratory distress on BiPAP.  Lungs - diminished BS on R.  Heart - RR,S1+S2 nml, no m/g/r.  Abd - soft, NT, ND, + BS.  Ext - no edema.         Medications:      All current medications were reviewed.         Data:      All new lab and imaging data was reviewed.   Labs:  No results for input(s): CULT in the last 168 hours.    Recent Labs  Lab 10/30/18  0630 10/29/18  1510 10/29/18  0608   WBC 13.4*  --  10.7   HGB 14.5  --  14.0   HCT 44.8  --  42.9   MCV 98  --  98   * 501* 537*       Recent Labs  Lab 10/30/18  0630 10/29/18  1510 10/29/18  0721 10/29/18  0608     --   --  136   POTASSIUM 4.8  --  3.8 Canceled, Test credited   CHLORIDE 98  --   --  101   CO2 31  --   --  28   ANIONGAP 4  --   --  7   *  --   --  105*   BUN 14  --   --  10   CR 0.85 0.58  --  0.65   GFRESTIMATED 68 >90  --  0*   GFRESTBLACK 82 >90  --  0*   GIO 8.0*  --   --  8.8      Recent Imaging:   Recent Results (from the past 24 hour(s))   US Chest Tube Insert    Narrative    This exam was marked as non-reportable because it will not be read by a   radiologist or a [facility] non-radiologist provider.

## 2018-10-31 NOTE — PROGRESS NOTES
Respiratory Therapy    Came to see pt this morning for nebulizer, patient had finger probe off and SPO2 was 76% while on 12LPM oxymask, tripod positioning and RR in 40's. Placed onto HFNC 40LPM and 100%, SpO2 in the 90's, but remained tachypneic and SOB, placed pt onto bipap 10/5 FiO2 70%, SpO2 96%. RR still in mid to high 30's lung sounds diminished with crackles. MD paged and updated.    Lillian Sharp RRT  10/31/2018

## 2018-10-31 NOTE — PROGRESS NOTES
Oncology/Hematology Follow Up Note:    Assessment and Plan:    Meggan Law is a 58 year old female patient, with metastatic renal cell carcinoma- admitted with worsening shortness of breath.     1. Metastatic Renal Cell Carcinoma:  -Diagnosed in August 2017 when she presented with large renal mass and multiple primary nodules.  -Since then she has been on various chemotherapies including immunotherapy and most recently has been on Cabozantinib at 60 mg p.o. Daily.  - Was see at the [facility], and took a few days of axitinib.  -I have seen this patient only once as she was previously followed in our [place] office.  She has seen palliative care as an outpatient.  We started having a discussion of quality and quantity of life a week ago when I first met her and today we reinforced that discussion.  Patient values quality of life and would like to start hospice care at the earliest.  This is very reasonable considering her declining pulmonary status and progression of disease as noted on CT scan.     PLAN: Discontinue all chemotherapy in favor of palliation and hospice.     2. Dyspnea  -Multifactorial from pulmonary disease, pleural-based disease, recurrent pleural effusion with progression of all of the above.  -Greatly appreciate thoracic surgery's input.     PLAN: Pleurex catheter to be placed tomorrow by interventional radiology and largest fluid pocket.  - Today clinical status declined and will need to reacess based on stability to go down for the procedure.     3. Palliative care  -Patient was seen as an outpatient but I would favor inpatient consult due to many questions about medications and patient and  needs support.     PLAN: Palliative care c/s. Appreciate c/s     4. DNR/ DNI.  - Condition declined.  - Hospice meeting today.  - If cannot weane off BiPAP may have to consider comfort cares as inpatient.  - RN will try to get on high flow prior to hospice meeting and discuss  "further.       D/w Dr. Gates, greatly appreciate her collaborative and kind care.    Subjective:     Breathing much worse, declined since yesterday, not comfortable, per RN was worse this AM. Doesn't like the Bipap.    Scheduled Medications:  Reviewed active medications    Labs:  CBC RESULTS:   Recent Labs   Lab Test  10/30/18   0630  10/29/18   1510  10/29/18   0608  08/11/18   1501   WBC  13.4*   --   10.7  9.0   HGB  14.5   --   14.0  15.2   HCT  44.8   --   42.9  45.8   MCV  98   --   98  95   PLT  493*  501*  537*  437       CMP  Recent Labs  Lab 10/30/18  0630 10/29/18  1510 10/29/18  0721 10/29/18  0608     --   --  136   POTASSIUM 4.8  --  3.8 Canceled, Test credited   CHLORIDE 98  --   --  101   CO2 31  --   --  28   ANIONGAP 4  --   --  7   *  --   --  105*   BUN 14  --   --  10   CR 0.85 0.58  --  0.65   GFRESTIMATED 68 >90  --  0*   GFRESTBLACK 82 >90  --  0*   GIO 8.0*  --   --  8.8       INR  Recent Labs  Lab 10/30/18  1201   INR 0.94       Objective/Physical Exam:  Blood pressure 110/71, pulse 97, temperature 96  F (35.6  C), temperature source Axillary, resp. rate (!) 32, height 1.575 m (5' 2\"), weight 40 kg (88 lb 1.6 oz), SpO2 93 %.  General appearance:tires, weak, struggling to breathe, on BIPAP  HEENT:sclera anicteric, no pallor, no thrush or mucositis.  Lungs:  Coarse BS.  Abdomen: soft, NT, ND , BS normal  Ext: no edema or rashes    Zach Ng MD  [facility]  10/31/2018 8:55 AM      "

## 2018-10-31 NOTE — PLAN OF CARE
Problem: Patient Care Overview  Goal: Plan of Care/Patient Progress Review  Outcome: No Change  Pt vitals stable. Continues on 12 L oxymask. Declined pain medication and anxiety medication throughout the night, did take later this morning as she felt like she hadn't been able to rest at all. Generally weak, assist of 1 to ambulate. Plan for drain placement today and discharge hopefully with hospice.

## 2018-10-31 NOTE — PROGRESS NOTES
Discharge Planner   Discharge Plans in progress: Plan is to discharge home tomorrow with Hospice.   Barriers to discharge plan: now on 11 L of o2 will need stretcher transport home  Follow up plan:      10/30/18 1400   [facility] [phone], Fax: [phone]   H/E stretcher transport set up from 1100       Entered by: Corinne C. White 10/31/2018 4:04 PM

## 2018-10-31 NOTE — PROGRESS NOTES
"[facility]  Palliative Care Progress Note  Text Page     Assessment & Plan   Recommendations:  1.Decisional Capacity -  Intact. Patient has an advance directive dated 9/22/2018.  If she becomes unable to demonstrate decisional capacity, Adam Lynn, spouse is her primary Health Care Agent.  Alternate is Rubén Law, son.  Health Care Directive 9/22/2017.   2. Pain- In bilateral lateral chest radiating under bra line to substernal area.  Possibly musculoskeletal with cough but new, intermittent, \"stabbing\" pain on right feels like previously broken rib.   Cough management as below.  Continue MS Contin 15mg BID as PTA as patient tolerates well.  She is concerned about drowsiness with liquid morphine 2.5mg as she used this at bedtime PTA, tolerated dilaudid well. Lidocaine patch available for \"shooting pain.\"  Meggan will not take over the counter medications noting they are not strong enough for her.  3. Dyspnea-Decline overnight requiring BiPAP support. On Oximask currently \"severe\" dyspnea.  Patient notes inability to rest overnight and despite low dose ativan and dilaudid no improvement in symptom.  Off BiPAP this AM, still planning for Pleurx catheter placement today if possible.  She is agreeable to trial increased dose of ativan and dilaudid as she did not become sedated.  Trial ativan 0.5mg PRN and dilaudid 2mg PRN.  4. Cough- May be aggravated by recurring pleural effusion.  Pleurx may help or aggravate.  Opioids most potent cough suppressant.  Continue Robitussin AC and Tessalon Pearls PRN  5. Spiritual Care-  Appreciate care of Socrates Sparrow. M.Div. PAtient felt well supported by his visit.   4. Care Planning- Hospice here today, discussed with patient and family concern she will decline despite our efforts as she declined overnight.  While we will continue plan for Pleurx and planning for hospice, she may decline and pass away faster than we had hoped being unable to use the " "hospice benefit and make it home.  They agree to continue current plan.     Goal of Care:DNR/DNI, Comfort plan.  Could use Bipap again support until pleurx is placed as it appeared to give her a rest with breathing overnight. Plan to discuss futility of further use for comfort purposes past this if needed after.     Shannon VERDIN, CNS, CNP  Pain Management and Palliative Care  [facility]  Pgr: [phone]    Time Spent on this Encounter   I spent  40 minutes >50% in assessment of the patient, counseling and discussion with the patient and family as documented inthis note, as well as coordination of care and discussion with the health care team.    Interval History   Respiratory decline overnight requiring Bipap, Now oxygen saturations stable at 94 on oximask but continues \"severe\" dyspnea along with anxiety.   Still planning for Pleurx this afternoon as draining may help.      Course of Hospitalization Discussions Data   Discussed on October 31, 2018 with Shannon VERDIN, CNS, CNP:  Met with patient, her  Arvind, Son Rubén with Hospice team prior to their informational setting.  Reviewed patient's status overnight.  She did not like BiPAP but thought it helped her.   points out her oxygen saturation is now in 90's off BiPAP.  We discussed the symptom of dyspnea and oxygen saturations as unreliable as predictor of symptom.  As we will continue to plan for Discharge home with hospice, I also discussed my concern that she would decline faster than initially expected and may pass away here unable to benefit from the hospcie benefit at home.  Ptient and family asked good questions about this and were able to speak openly about it.  Patient is willing to trial more medications with a goal of improved dyspnea, still concerned she did not want to \"just be put to sleep\"  But reassured by the fact she tolerated lower doses of ativan and dilaudid well earlier today.   Discussed on " October 30, 2018 with Shannon Jose APRN, CNS, CNP:  Met with patient and brother Dylan at bedside.  Patient is somewhat anxious and overwhelmed but is able to speak directly about her current illness and plans to enroll in hospice as life is short.  She tells me she is most concerned about sleeping her life away and that she has found herself sleeping more and more during the day at home.  She wants to avoid sedating medications, we discussed medications for dyspnea and how they may be used in acute decline or chronically and non-medications for dyspnea, when she may want to choose a medication despite possible drowsiness and the natural process of decline from respiratory failure that eventually includes somnolence, even without medications.   Patient is most concerned about her son Rubén and how he will manage he illness, and decline and eventual passing.  Dylan is reassuring he will continue to be a support to Rubén.  We discussed Hospice as a resource to family even after passing.    We reviewed Pleurx catheter and use with pamphlet she had already been given plus a picture from Pleurx website.      Other important issues for patient are that over the counter medications do not work for her and she is hesitant to add more medications to her regime, although acknowledges she will want to review current medications for those she may not need for comfort.        Review of Systems    CONSTITUTIONAL: NEGATIVE for fever, chills  ENT/MOUTH: NEGATIVE for ear, mouth and throat problems  RESP: Significant cough or SOB worsening overnight.  CV: NEGATIVE for chest pain, palpitations or peripheral edema    Palliative Symptom Review (0=no symptom/no concern, 1=mild, 2=moderate, 3=severe):      Pain: 1-mild      Fatigue: 2-moderate      Nausea: 0-none      Constipation: 0-none      Diarrhea: 0-none      Depressive Symptoms: 1-mild      Anxiety: 3-severe      Drowsiness: 0-none      Poor Appetite:       Shortness of Breath:  3-severe      Insomnia: 3-severe    Physical Exam   Temp:  [96  F (35.6  C)-97.4  F (36.3  C)] 96  F (35.6  C)  Heart Rate:  [] 98  Resp:  [24-32] 32  BP: ()/(59-71) 110/71  FiO2 (%):  [55 %-100 %] 55 %  SpO2:  [87 %-96 %] 93 %  88 lbs 1.6 oz  GEN:  Alert, oriented x 3, appears restless and in distress.  HEENT:  Normocephalic/atraumatic, no scleral icterus, no nasal discharge, mouth moist.  CV:   no edema BLE.  RESP:  Labored respiratory effort on oxymask.  ABD:  Rounded, soft, non-tender/non-distended.   EXT:  Edema & pulses as noted above.  CMS intact x 4.     SKIN:  Dry to touch, no exanthems noted in the visualized areas.    NEURO: VEGA, no fcal deficits noted.  Psych: anxious affect.  Anxious, restless, cooperative, revisits.    Medications     - MEDICATION INSTRUCTIONS -         ipratropium - albuterol 0.5 mg/2.5 mg/3 mL  3 mL Nebulization 4x daily     Lacosamide  100 mg Oral QAM     lacosamide  200 mg Oral QPM     lamoTRIgine  300 mg Oral Daily     levETIRAcetam  1,000 mg Oral BID     lidocaine   Transdermal Q24H    And     lidocaine   Transdermal Q8H     methimazole (TAPAZOLE) tablet 5 mg  5 mg Oral QPM     morphine  15 mg Oral Q12H     sodium chloride (PF)  3 mL Intracatheter Q8H       Data   Results for orders placed or performed during the hospital encounter of 10/29/18 (from the past 24 hour(s))   INR   Result Value Ref Range    INR 0.94 0.86 - 1.14   Social Work IP Consult    Narrative    White, Corinne C, MIRIAN     10/30/2018  2:31 PM  Care Transition Initial Assessment -   Reason For Consult: discharge planning  Met with: Patient and Family    Active Problems:    Acute on chronic respiratory failure with hypoxia (H)       DATA  Lives With: child(bryan), adult, spouse  Living Arrangements: house  Description of Support System: Supportive, Involved  Who is your support system?: , Children, Sibling(s)  Support Assessment: Adequate family and caregiver support,   Adequate social supports.  "Pt's spouse is retired and able to   assist   Identified issues/concerns regarding health management: PT has   Metastatic renal cell carcinoma with lung Mets.         Resources List: Hospice  [facility] meeting for 10/31 @ 1300      Quality Of Family Relationships: supportive     ASSESSMENT  Cognitive Status:  awake, alert and oriented  Concerns to be addressed: PT is retired and home with support. Pt   will need a pleurex cath placed tomorrow before going home on   Hospice. This is a  Big adjustment for pt.. PT lives in a multi   level home. The main level DOES NOT have a bathroom. PT will need   to ambulate 6-8 steps to get the upper level where her bedroom   and bathroom are.   Pt does have an adjustable bed, however she may need a Hospital   bed for the main level with a commode. Pt has home o2 through   [facility] and has been on 3L and at time now 6L   Pt prior to admission was independent with ALD\"s at home      PLAN  Hospice meeting for tomorrow with  Home Care @9:30  Spouse will be here and bring o2 tank from home for transport       Lactic acid level STAT for sepsis protocol   Result Value Ref Range    Lactate for Sepsis Protocol 2.3 (H) 0.7 - 2.0 mmol/L   Lactic acid level STAT for sepsis protocol   Result Value Ref Range    Lactate for Sepsis Protocol 1.5 0.7 - 2.0 mmol/L       "

## 2018-10-31 NOTE — PLAN OF CARE
Problem: Patient Care Overview  Goal: Plan of Care/Patient Progress Review  Pt continues to be hospitalized for respiratory failure. Hr tacky at times, BP soft. Crackles in bases. Was placed on BiPAP this morning, switched back to oxymask around 0930, tolerating well, 14L 02 88-91%. Ativan/dilaudid for SOB. Pleurix drain placed in IR. Had hospice meeting today. Pt tearful/anxious throughout shift. Placed back on regular diet. Up assist 1. Discharge possible tomorrow.

## 2018-10-31 NOTE — PROGRESS NOTES
Pt tolerated right pleurex drain catheter placement by DR. Cui well with no further IV medications.  Vital signs unchanged.  Pt alert and visiting thru out procedure.  900 ml's red tinged fluid removed and sterile dressing applied to catheter site per protocol.  Lisa sent up with pt to room per cart when transferred back to IP room.  Report given.

## 2018-10-31 NOTE — CONSULTS
[facility] Consult    Writer and RN Liane met with pt Meggan, spouse Arvind, and son Rubén bedside this morning for a hospice consult.  Provided info about our philosophy of care, insurance benefit, and available services.  Plans were to have a pleurx drain placed this afternoon and then potentially go home later today with the support of hospice, but pt suffered a event overnight that has left her in a condition not stable enough for discharge.  Writer provided hospice handbook with 24 hr phone number.  Pt and family understand that the hospital staff will notify us if discharge plans are finalized so that we can reschedule our admission process with them either in the hospital before she leaves or back home shortly after they have arrived.      Thank you for this referral.    BRIGITTE Barbour, [facility] CenterSW  [phone]

## 2018-10-31 NOTE — PLAN OF CARE
Problem: Patient Care Overview  Goal: Plan of Care/Patient Progress Review  Outcome: Declining  Patient remain hospitalized with SOB,  O2 12L oxymask, morphine 1x for SOB. NPO from 1:00Am for drain placement. Reg diet calixto appetite, emesis x1, voiding adquetly, SBA, Soft BP, denies pain, BLL crackles. Lactic 2.3 MD aware. Palliative, onco consult.Hospice meeting tomorrow 10/31/18. Son at bedside for comfort. Plan: Discharge home with hospice in 1-2 days.

## 2018-10-31 NOTE — PLAN OF CARE
Problem: Patient Care Overview  Goal: Plan of Care/Patient Progress Review  Outcome: No Change  Pt continues to be hospitalized for SBO. VSS. Pt tearful, moaning, crying out this AM due to abdominal pain. Dilaudid given with eventual relief. x2 emesis this morning. Zofran given with no relief. NG placed. 400ml out. Ostomy with un-measurable amount of liquid output. NPO. Surgery following. Discharge TBD.

## 2018-10-31 NOTE — PROGRESS NOTES
[facility]  Hospice admission is set for 11/1/18 at 1300 in the home, transport is set for 1100. New O2 set up is being delivered to the home this evening. Will follow up in the morning to assure symptoms are managed and pt is ready to discharge home. Spouse aware of plan.

## 2018-10-31 NOTE — PROGRESS NOTES
THORACIC & FOREGUT SURGERY    S:  Breathing worsened overnight and placed on BiPAP.  She was seen at bedside resting in bed, however tachypneic and unable to speak full sentences.  Currently scheduled for tunneled catheter by IR today at 12:30 PM.  One episode of vomiting yesterday.  Denies fever or chills.  Denies diarrhea.  She has no other complaints today.    O:  Vitals:    10/31/18 0713 10/31/18 0721 10/31/18 0736 10/31/18 0756   BP:    110/71   BP Location:    Left arm   Pulse:       Resp:    (!) 32   Temp:    96  F (35.6  C)   TempSrc:    Axillary   SpO2: (!) 87% 95% 96% 93%   Weight:       Height:           A&Ox3, moderate distress  Breathing labored, currently on bipap, crackles left side, decreased breath sounds right side  RRR  Soft, NDNT  Distal extremities warm.   No calf tenderness.    A/P: Meggan Law is a 58 year old female with recurrent malignant left pleural effusion:  -S/p thoracentesis 10/29 with increased SOB and oxygen requirement, currently on bipap  -Hospice meeting this morning  -Scheduled for IR to place tunneled catheter today at 12:30pm      Taina Gandara PA-C  Thoracic Surgery

## 2018-11-01 NOTE — PROGRESS NOTES
Oncology Progress Note    Patient tolerated placement of PleurX catheter well yesterday with removal of 1450 ml pleural fluid. She developed increasing dyspnea and chest pain last night, now requiring high flow O2 mask. Had nausea and emesis yesterday after hydromorphone. Notes persistent right chest pain and SOB.      Afebrile. Resp rate 32-40 overnight. BP stable last night.  Appears tachypneic, somewhat lethargic, but able to answer questions appropriately.      IMPRESSION:  Metastatic renal cell cancer with malignant pleural effusion and pulmonary mets, with progressive respiratory compromise. To continue high flow O2, and increase morphine for respiratory comfort and chest pain. She is scheduled to return home with hospice support, and she will require aggressive pain management. Continue comfort care.      Ever Leon M.D.  [facility]  11/1/2018   8:45 AM

## 2018-11-01 NOTE — PROGRESS NOTES
Discharge Planner   Discharge Plans in progress: Called Arvind to update that pt is now on 15L of o2, up from 11L . Pt wish is to be home.  Arvind stated that pts sister and brother will be here in the AM.  Barriers to discharge plan: called H/E to update and called Janneth at  Hospice to have someone connect with him about how to connect the tanks for pt's support   Follow up plan:      10/30/18 1400   [facility] [phone], Fax: [phone]   H/E stretcher at 11:00        Entered by: Corinne C. White 11/01/2018 9:40 AM

## 2018-11-01 NOTE — PLAN OF CARE
Problem: Patient Care Overview  Goal: Plan of Care/Patient Progress Review  Outcome: Adequate for Discharge Date Met: 11/01/18  Pt continues on 15L 02. PO morphine and PO ativan given this AM, patient resting comfortably. Verbally reviewed medications and plan of care with , Arvind. Palliative also reviewed medications and plan of care with Arvind and family.     Pt to D/C to home with hospice.  Arvind, , provided with d/c instructions, including new medications, when medications were last given, and when to take them again, paperwork sent with transport. Arvind verbalized understanding of all d/c and f/u instructions. Hospice to meet with patient/family at 1300 today in home.  All questions were answered at this time.  Medications sent with family.  [employer] to provide transport.  All personal belongings sent with pt.

## 2018-11-01 NOTE — PLAN OF CARE
Problem: Patient Care Overview  Goal: Plan of Care/Patient Progress Review  Outcome: No Change  Pt vitals stable, continues to require 15L supplemental O2 via oxymask to maintain saturations 88-92%. Saturations drop very quickly if anxious. Continues to be tachypneic. Rested very well the majority of the night. 1 small episode of emesis after drinking water and being repositioned. Received 1 prn dose dilaudid for pain and shortness of breath, 4mg dose very effective. With movement and deep breathing pt does have pain to pleurex drain site. Disoriented to time. Alarms on bed. Plan to go home with hospice today.

## 2018-11-01 NOTE — DISCHARGE SUMMARY
[facility]  Discharge Summary        Meggan Law MRN# [id number]   YOB: 1960 Age: 58 year old     Date of Admission:  10/29/2018  Date of Discharge:  11/1/2018  Admitting Physician:  Sherri Gates MD  Discharge Physician: Katlin Roy MD  Discharging Service: Hospitalist     Primary Provider: Brodie Parker  Primary Care Physician Phone Number: [phone]         Discharge Diagnoses/Problem Oriented Hospital Course (Providers):    Meggan Law was admitted on 10/29/2018 by Sherri Gates MD and I would refer you to their history and physical.  The following problems were addressed during her hospitalization:    1. Acute on chronic hypoxic respiratory failure: This am worsening SOB and on BiPAP. Discussed with patient. s/p PleurX and possible discharge if stable on hospice.  2. Recurrent malignant pleural effusion: Last tap 3 days prior to admission and then needed to 1400 cc removed.  She is required taps every 3-4 days which seems unsustainable to me.  and now is accumulating even faster (24-48 hours), she is a poor candidate for pleurodesis per Dr Cardona and so will plan for pleurex catheter placement .  3. Throat tightening: Unclear etiology.  She was started on a new chemotherapeutic agent is an oral tablet that goes by the name of inlyta sometime last week which can cause dysphonia but has no association with angioedema, she attributes it to taking promethazine that she was prescribed by the palliative care service last week-and on review of UTD anxiety and angioedema are listed as side effects.  This actually seems like the more likely culprit.  For now will hold both.  I will add promethazine to her allergy list, but I'll hold off on the inlyta.  That sensation has resolved  4. Htn:  but BPs on the low side, will discontinue amlodipine  5. Epilepsy:  Cont home lamotrigine and vimpat  6. Metastatic renal cell cancer: appreciate Dr Ng and EUGENE Jose-plan is  to transition to home  hospice  7. Asthma/copd:  ? Exacerbation-poor air movement but no wheezing.  duonebs qid with prn albuterol, no indication for steroids  8. Hyperthyroidism:  Cont methimazole  9. Subacute and constant sternal pain: discussed with radiology-no e/o metastatic disease, suspect MSK due to chronic coughing.  Trial icy hot patch  10. Chronic pain in setting of metastatic RCC-home morphine dosing  11. Prognosis:  Terminal.  She understands that now.   does as well, palliative care to help with transition to home hospice     58 year old female admitted on 10/29/2018 with a history of hypertension, multinodular goiter with hyperthyroidism, COPD, epilepsy, and renal cell carcinoma with heavy lung burden from metastatic disease further complicated by recurrent pleural effusion(malignant), chronic hypoxic respiratory failure on nasal cannula oxygen at 3 L, admitted on 10/29/2018 with complaints of increasing shortness of breath and a sense that her throat was closing up.      She had extensive and appropriate workup in the emergency room including a chest x-ray that showed recurrence of a pleural effusion (most recently tapped 3 days ago), negative EKG and negative troponin, she underwent thoracentesis of approximately 1400 cc and remained hypoxic and still short of breath prompting CT scan of the chest which was negative for pulmonary embolism but did show progressive metastatic lung disease.      Regarding her throat and sensation that it was closing up, bedside laryngoscopy was performed by Dr. Jane showed normal pharyngeal and laryngeal structures without evidence of angioedema or vocal cord abnormality.      Overnight into 10/30/18 she has again become progressively hypoxic and more sob.  Dr Gates discussed the case with Dr Cardona who would recommend pleurex catheter instead of talc pleurodesis.        Discussed with Dr Ng and patient.  She would like to pursue hospice now, and would  like to return home as soon as possible after pleurex catheter in place.     Admitted as inpatient. Has deterioration of resp status despite thoracentesis and PleurX. However, wants to go home with hospice. Detailed discussion with patient and siblings.        Code Status:      DNR / DNI        Brief Hospital Stay Summary Sent Home With Patient in AVS:                Important Results:      CT chest         Pending Results:        Unresulted Labs Ordered in the Past 30 Days of this Admission     No orders found from 8/30/2018 to 10/30/2018.            Discharge Instructions and Follow-Up:      Follow-up Appointments     Follow-up and recommended labs and tests        Follow up with hospice team  No follow up labs or test are needed.                      Discharge Disposition:      Discharged to home         Discharge Medications:        Current Discharge Medication List      START taking these medications    Details   acetaminophen (TYLENOL) 325 MG tablet Take 1-2 tablets (325-650 mg) by mouth every 4 hours as needed for mild pain or fever  Qty: 100 tablet, Refills: 1    Associated Diagnoses: End of life care      atropine 1 % ophthalmic solution Take 2-4 drops by mouth, place under tongue or place inside cheek every 2 hours as needed for other (terminal respiratory secretions) Not for ophthalmic use.  Qty: 5 mL, Refills: 0    Associated Diagnoses: End of life care      bisacodyl (DULCOLAX) 10 MG Suppository Unwrap and insert 1 suppository rectally twice daily as needed for constipation.  Qty: 12 suppository, Refills: 1    Associated Diagnoses: End of life care      Lidocaine (LIDOCARE) 4 % Patch Place 1-2 patches onto the skin every 24 hours Apply patch(s) to painful area of chest wall as needed. To prevent lidocaine toxicity, patient should be patch free for 12 hrs daily. Patches may be cut to smaller size prior to removing release liner. This is over the counter.  Refills: 0    Associated Diagnoses: End of life  care      LORazepam (ATIVAN) 2 MG/ML (HIGH CONC) solution Take 0.25-0.5 mLs (0.5-1 mg) by mouth, place under tongue or insert rectally every 3 hours as needed for agitation, anxiety, seizures or vomiting (restlessness)  Qty: 30 mL, Refills: 0    Associated Diagnoses: End of life care      MEDICATION INSTRUCTION If care facility cannot accept or use ranges, facility is instructed to use lower end of dosing range    Associated Diagnoses: End of life care      morphine sulfate, high concentrate, (ROXANOL-CONCENTRATED) 20 MG/ML concentrated solution Place 0.25-0.5 mLs (5-10 mg) under the tongue every 2 hours as needed for shortness of breath / dyspnea or breakthrough pain (or cough)  Qty: 118 mL, Refills: 0    Associated Diagnoses: End of life care      ondansetron (ZOFRAN-ODT) 4 MG ODT tab Take 1 tablet (4 mg) by mouth every 6 hours  Qty: 14 tablet, Refills: 0    Associated Diagnoses: End of life care      sennosides (SENOKOT) 8.6 MG tablet Take 1-2 tablets by mouth 2 times daily  Qty: 100 tablet, Refills: 0    Associated Diagnoses: End of life care         CONTINUE these medications which have NOT CHANGED    Details   Benzonatate (TESSALON PERLES PO) Take 100 mg by mouth 3 times daily as needed       guaiFENesin-codeine (ROBITUSSIN AC) 100-10 MG/5ML SOLN solution Take 1 tsp. by mouth every 4 hours as needed       !! Lacosamide (VIMPAT) 100 MG TABS tablet Take 100 mg by mouth every morning      !! lacosamide (VIMPAT) 200 MG TABS tablet Take 200 mg by mouth every evening      LamoTRIgine (LAMICTAL) 300 MG TB24 Take 1 tablet by mouth daily      levETIRAcetam (KEPPRA) 1000 MG TABS Take 1 tablet by mouth 2 times daily      morphine (MS CONTIN) 15 MG 12 hr tablet Take 15 mg by mouth every 12 hours       !! - Potential duplicate medications found. Please discuss with provider.      STOP taking these medications       amLODIPine (NORVASC) 2.5 MG tablet Comments:   Reason for Stopping:         Axitinib (INLYTA PO) Comments:  "  Reason for Stopping:         calcium carbonate (OS- MG Andreafski. CA) 600 MG tablet Comments:   Reason for Stopping:         HYDROcodone-acetaminophen (NORCO) 5-325 MG per tablet Comments:   Reason for Stopping:         MethIMAzole (TAPAZOLE PO) Comments:   Reason for Stopping:         morphine 10 MG/5ML solution Comments:   Reason for Stopping:         Multiple Vitamins-Minerals (MULTIVITAMIN ADULT) TABS Comments:   Reason for Stopping:         promethazine (PHENERGAN) 25 MG tablet Comments:   Reason for Stopping:         zoledronic acid (ZOMETA) 4 MG/100ML SOLN Comments:   Reason for Stopping:                 Allergies:         Allergies   Allergen Reactions     Promethazine Other (See Comments)     sob           Consultations This Hospital Stay:      Consultation during this admission received from oncology         Condition and Physical on Discharge:      Discharge condition: Stable   Vitals: Blood pressure 91/58, pulse 103, temperature 96  F (35.6  C), temperature source Oral, resp. rate 28, height 1.575 m (5' 2\"), weight 40 kg (88 lb 1.6 oz), SpO2 90 %.  88 lbs 1.6 oz      Gen - Awake, frail .  Lungs - diminished BS on R.  Heart - RR,S1+S2 nml, no m/g/r.  Abd - soft, NT, ND, + BS.  Ext - no edema.         Discharge Time:        > 30 mins on chart review, exam and .        Image Results From This Hospital Stay (For Non-EPIC Providers):        Results for orders placed or performed during the hospital encounter of 10/29/18   POC US ECHO LIMITED    Impression    Limited Bedside Cardiac Ultrasound, performed and interpreted by me.   Indication: Shortness of Breath.  Thoracic views were acquired.   Image quality was satisfactory.    Findings:    Abnormal large right sided hypoechoic areas with septation throughout the right hemithorax    IMPRESSION: Moderate to large right sided pleural effusion with some areas of septation     XR Chest 2 Views    Narrative    XR CHEST 2 VIEWS   10/29/2018 6:45 AM "     HISTORY: Dyspnea.     COMPARISON: 9/25/2018.    FINDINGS: Multiple bilateral pulmonary nodules, as seen previously.  These overall appear increased in size. There is a right pleural  effusion. There is new widening of the right superior mediastinum,  which may be lymph node disease. No pneumothorax. The heart size is  normal. Old bilateral rib fractures.      Impression    IMPRESSION:   1. Right pleural effusion.  2. Multiple pulmonary nodules.    DEVIN CLIFTON MD   US Thoracentesis    Narrative    PROCEDURE:   Ultrasound-guided thoracentesis    DATE OF PROCEDURE:   10/29/2018 8:12 AM    OPERATORS:    El Buckley MD    COMPLICATIONS:   None     SPECIMENS TO LAB:   None    MEDICATION(S) GIVEN:   1% Lidocaine SQ     CONTRAST:   None    Referenced air kerma: 0 mGy  Fluoroscopy time: 0 minutes    ESTIMATED BLOOD LOSS:  Minimal    Pre-procedure diagnosis: Metastatic renal cancer  Post-procedure diagnosis: Same    CLINICAL HISTORY/INDICATION:   58-year-old female with symptomatic large right pleural effusion.  Presents for ultrasound-guided thoracentesis    PROCEDURE AND FINDINGS:   Following a discussion of the risks, benefits, indications and  alternatives to treatment, appropriate informed consent was obtained.  The patient was brought to the Ultrasound suite and placed upright.  The right posterior chest was prepped and draped in a sterile fashion.  A time out was performed per hospital universal protocol policy to  ensure correct patient, site and procedure to be performed.     Preliminary ultrasonography of the right  chest was performed and  demonstrates a large loculated pleural effusion.  An ultrasound image  was archived. Local anesthesia was achieved with 1% lidocaine. A  one-step catheter was advanced into the pleural space under direct  ultrasound guidance with visualization of needle tip entry into the  pleural space. A total of 950 mL of fluid was then removed from the  initial access. Repeat  ultrasound shows residual loculated pleural  effusion separate from the initial access point. An ultrasound image  was archived. Local anesthesia was achieved with 1% lidocaine. A Yueh  catheter was advanced into the pleural space under direct ultrasound  guidance with visualization of needle tip and entry into the pleural  space. A total of 350 mL of fluid was then removed from the second  access site. Both catheters were removed uneventfully, and a sterile  dressings were applied.     Throughout the procedure, the patient was monitored by a radiology  nurse for heart rate, blood pressure and oxygen saturation which  remained stable. The patient tolerated the procedure well and left  interventional radiology in stable condition.       Impression    IMPRESSION:   1.  Ultrasound-guided thoracentesis, as detailed above.  2.  Total volume of fluid removed from 2 separate accesses, 1300 mL    JOHN OLSEN MD   XR Chest 1 View    Narrative    CHEST ONE VIEW October 29, 2018 8:14 AM     HISTORY: Post right thoracentesis.      Impression    IMPRESSION: Improved aeration at the right lung base compared to  earlier today. No pneumothorax. Bilateral ill-defined nodular  opacities are noted.    RUI MIRZA MD   Chest CT, IV contrast only - PE protocol    Narrative    CT CHEST PULMONARY EMBOLISM WITH CONTRAST  10/29/2018 9:24 AM     HISTORY:  Hypoxia, dyspnea.    COMPARISON: September 11, 2018    TECHNIQUE: Volumetric helical acquisition of CT images of the chest  from the lung apices to the kidneys were acquired after the  administration of 46 mL Isovue-370  IV contrast. Radiation dose for  this scan was reduced using automated exposure control, adjustment of  the mA and/or kV according to patient size, or iterative  reconstruction technique.    FINDINGS: There is no pulmonary embolism. Extensive pulmonary  metastatic disease. Representative lesion in the right lower lobe  measures 4.1 x 2.9 cm, previously 2.7 x 2.1 cm.  Partially loculated  right pleural effusion increased from previous. Trace left pleural  fluid. The heart is not enlarged.  Thoracic aorta is atherosclerotic  without evidence of dissection or aneurysm. No definite pericardial  effusion.  There is no pneumothorax. Adrenal glands are normal.   Remainder of the visualized upper abdomen is unremarkable.      Impression    IMPRESSION:   1. No pulmonary embolism demonstrated.  2. No thoracic aortic dissection or aneurysm.   3. Increased metastatic disease.  4. Increased partially loculated right pleural effusion.    OLIVER MEJIA MD   XR Chest 2 Views    Narrative    XR CHEST 2 VW 10/30/2018 10:03 AM    HISTORY: Respiratory failure.    COMPARISON: Chest CT, 10/29/2018    FINDINGS: Moderate right pleural effusion with right basilar  consolidation. Bilateral patchy airspace opacity is otherwise not  significantly changed. No pneumothorax.      Impression    IMPRESSION: Moderate right pleural effusion, increased from yesterday.  Patchy bilateral airspace opacity is otherwise similar.    BRIE GORDON MD   US Chest Tube Insert    Narrative    This exam was marked as non-reportable because it will not be read by a   radiologist or a [facility] non-radiologist provider.             XR Chest Tube Placement Right    Narrative    XR CHEST TUBE PLACEMENT NON-TUNNELED RIGHT  10/31/2018 1:52 PM     HISTORY:  58-year-old female with recurrent large right pleural  effusion requiring frequent thoracenteses twice a week now presenting  with significant shortness of breath on BiPAP.    COMPARISON: None.    FINDINGS: After obtaining informed consent, the patient was placed in  a supine position on the fluoroscopy table. The inferolateral thorax  was prepped and draped in the usual sterile manner. 1% lidocaine was  injected for local anesthesia. Under ultrasound guidance, access into  the pleural space was obtained using a 5 Lao needle catheter  system. A wire was curled in the pleural  space. Over the wire, a 16  Turkish peel-away sheath was placed. Approximately 10 cm inferior to  the pleural entry site, a small skin incision was made. A 16 Turkish  Pleurx catheter was pulled through this incision to the pleural entry  site.. The catheter was then passed through the peel-away sheath into  the pleural space. A fluoroscopic image was saved to document catheter  position. A thoracentesis was performed. Approximately 600 cc of fluid  was removed. The catheter was sutured to the tunnel entry site using 0  silk in a pursestring manner. The pleural entry site was closed with  deep interrupted stitch using 3-0 Vicryl. This was supplemented with  Dermabond. 850 cc of fluid was drained out.    The patient tolerated the procedure well. There were no immediate  postprocedure complications. The patient's vital signs were monitored  by radiology nursing staff under my supervision and remained stable  throughout the study.    FLUOROSCOPY TIME: 23 seconds, radiation dose: 2 mg      Impression    IMPRESSION: Ultrasound and fluoroscopic guided tunneled pleural Pleurx  catheter placed as described above.    HILLARY SHETTY MD           Most Recent Lab Results In EPIC (For Non-EPIC Providers):      Results for orders placed or performed during the hospital encounter of 10/29/18 (from the past 24 hour(s))   US Chest Tube Insert    Narrative    This exam was marked as non-reportable because it will not be read by a   radiologist or a [facility] non-radiologist provider.             XR Chest Tube Placement Right    Narrative    XR CHEST TUBE PLACEMENT NON-TUNNELED RIGHT  10/31/2018 1:52 PM     HISTORY:  58-year-old female with recurrent large right pleural  effusion requiring frequent thoracenteses twice a week now presenting  with significant shortness of breath on BiPAP.    COMPARISON: None.    FINDINGS: After obtaining informed consent, the patient was placed in  a supine position on the fluoroscopy table. The  inferolateral thorax  was prepped and draped in the usual sterile manner. 1% lidocaine was  injected for local anesthesia. Under ultrasound guidance, access into  the pleural space was obtained using a 5 Irish needle catheter  system. A wire was curled in the pleural space. Over the wire, a 16  Irish peel-away sheath was placed. Approximately 10 cm inferior to  the pleural entry site, a small skin incision was made. A 16 Irish  Pleurx catheter was pulled through this incision to the pleural entry  site.. The catheter was then passed through the peel-away sheath into  the pleural space. A fluoroscopic image was saved to document catheter  position. A thoracentesis was performed. Approximately 600 cc of fluid  was removed. The catheter was sutured to the tunnel entry site using 0  silk in a pursestring manner. The pleural entry site was closed with  deep interrupted stitch using 3-0 Vicryl. This was supplemented with  Dermabond. 850 cc of fluid was drained out.    The patient tolerated the procedure well. There were no immediate  postprocedure complications. The patient's vital signs were monitored  by radiology nursing staff under my supervision and remained stable  throughout the study.    FLUOROSCOPY TIME: 23 seconds, radiation dose: 2 mg      Impression    IMPRESSION: Ultrasound and fluoroscopic guided tunneled pleural Pleurx  catheter placed as described above.    HILLARY SHETTY MD   Creatinine   Result Value Ref Range    Creatinine 1.37 (H) 0.52 - 1.04 mg/dL    GFR Estimate 39 (L) >60 mL/min/1.7m2    GFR Estimate If Black 48 (L) >60 mL/min/1.7m2   Platelet count   Result Value Ref Range    Platelet Count 496 (H) 150 - 450 10e9/L

## 2018-11-01 NOTE — PLAN OF CARE
Problem: Patient Care Overview  Goal: Plan of Care/Patient Progress Review  Outcome: No Change  Pt admitted with respiratory failure. BP running soft, tachycardic at times.  Pleurex drain placed today, dressing intact.  Pt dyspneic with exertion. Lungs diminished with crackles. At beginning of shift, pt was on 11 L oxymask sats around 87-90% but resting.  RR 28-32.  Increase in coughing, coughing up some bloody sputum.   Patient had increase in pain and became more dyspneic. Dilaudid and morphine given for pain, but having lots of incisional pain at pleurex site.  Sats dropping to 60's, increased oxygen and paged respiratory.  Patient started on Bipap.  RR 40's.      While on BiPaP, patient vomited green bile. Nurse in room, able to remove mask.  MD notified of patient discomfort.  Comfort medications ordered IV.  IV ativan given for nausea and comfort, patient back on oxy mask currently art 15 L.

## 2018-11-01 NOTE — PROGRESS NOTES
pleurx drainage done per protocol , sister at bedside very supportive 450cc drained, seanang. Drainage along with some bloody secretions.

## 2018-11-01 NOTE — PROGRESS NOTES
X-cover    Called for acute SOB after vomiting on BiPAP.  Moderate distress, now on face mask.      Recommend stopping continuous pulse ox, goal should be to ensure patient comfortable regardless of what her O2 sats are.  Supplemental O2 as tolerated.      Nebs prn  I also ordered prn IV morphine and lorazepam for sob

## 2018-11-01 NOTE — PROGRESS NOTES
"[facility]  Palliative Care Progress Note  Text Page     Assessment & Plan   Recommendations:  1.Decisional Capacity -  Variable. Patient has an advance directive dated 9/22/2018.  If she becomes unable to demonstrate decisional capacity, Adam Lynn, spouse is her primary Health Care Agent.  Alternate is Rubén Law, son.  Health Care Directive 9/22/2017. POLST completed 11/1/2018 with , [facility] Adam Lynn via phone prior to pt discharge.  2. Pain- In bilateral lateral chest radiating under bra line to substernal area.  Possibly musculoskeletal with cough but new, intermittent, \"stabbing\" pain on right feels like previously broken rib.   Cough management as below.  Continue MS Contin 15mg BID as PTA as patient tolerates well.  11/1/2018 discharged on Roxanol 5-10 mg q 2 hours prn breakthrough pain, shortness of breath. She is concerned about drowsiness with liquid morphine 2.5mg as she used this at bedtime PTA, however is experiencing increasing FIO2 needs and air hunger. Nurses are concerned that hydromorphone is causing pt nausea. Pt seems to be tolerating morphine liquid.  Lidocaine patch available for \"shooting pain.\"  Meggan will not take over the counter medications noting they are not strong enough for her.  3. Dyspnea-Decline overnight requiring BiPAP support. On Oximask currently \"severe\" dyspnea at 15 liters. Pt had pleurx catheter placed with 1450 ml on 10/31. Pleurx drained for 450 ml this am. She is tolerating liquid morphine and lorazepam and will send her home with these today. Discussed non-pharmacologic interventions for air hunger, including positioning, fan at bedside, adjustments to room environment with pt's sister and sister's spouse.  4. Cough- May be aggravated by recurring pleural effusion.  Pleurx may help or aggravate.  Opioids most potent cough suppressant.  Continue Robitussin AC and Tessalon Pearls PRN. Also Roxanol 5-10 mg q 2 hours prn at " "discharge.  5. Nausea - likely due to anxiety, hypoxia. Pt with possible angioedema/airway reaction to promethazine so will not order scheduled compazine. Pt tolerates morphine so will discontinue hydromorphone and order roxanol at discharge. Will send out on scheduled ondansetron 4 mg q 6 hours to help with nausea. Pt also has lorazepam prn.  5. Spiritual Care-  Appreciate care of Socrates Sparrow. M.Div.. SWS and Chaplains are following.  4. Care Planning- Pt wishes to go home despite increasing FIO2 requirements over night. Appears fairly comfortable with current interventions. Lengthy discussion with  Pt's sister and spouse at pt bedside with bedside nurse Danyel, Dr. Roy,  Hospice Liasion Kasandra Levy, and with  Adam \"Arvind\" Nitish per phone conversation. They are agreeable to pt discharge home today with  hospice support.     Goal of Care:DNR/DNI, Comfort plan.  Pleurx catheter placed 10/31.  Discharge to home with  Hospice support. POLST completed. Comfort meds ordered and discussed with pt's , pt's sister and spouse.    Mleisa VERDIN, CNS  Pain Management and Palliative Care  [facility]  Pgr: [phone]    Time Spent on this Encounter   I spent  55  minutes >50% in assessment of the patient, counseling and discussion with the patient and family as documented inthis note, as well as coordination of care and discussion with the health care team.    Interval History   Respiratory decline again overnight. Currently at 15 liters per oxymask with O2 sat at 90%.     Pt had pleurx placed yesterday into R lung with a total of 1450 ml of pleural fluid removed. Pt experienced increase pain in area of insertion overnight, less today. Nursing preparing to tap pleurx catheter again this am for pt's comfort prior to discharge.    Nursing concern that oral hydromophone is contributing to pt's nausea. Pt was switched to IV MS and IV lorazepam overnight, now trying oral versions " "again and pt symptoms seem to be controlled.    Pt wishes to go home.    Course of Hospitalization Discussions Data   Discussed on October 31, 2018 with Shannon VERDIN, CNS, CNP:  Met with patient, her  Arvind, Son Rubén with Hospice team prior to their informational setting.  Reviewed patient's status overnight.  She did not like BiPAP but thought it helped her.   points out her oxygen saturation is now in 90's off BiPAP.  We discussed the symptom of dyspnea and oxygen saturations as unreliable as predictor of symptom.  As we will continue to plan for Discharge home with hospice, I also discussed my concern that she would decline faster than initially expected and may pass away here unable to benefit from the hospcie benefit at home.  Ptient and family asked good questions about this and were able to speak openly about it.  Patient is willing to trial more medications with a goal of improved dyspnea, still concerned she did not want to \"just be put to sleep\"  But reassured by the fact she tolerated lower doses of ativan and dilaudid well earlier today.   Discussed on October 30, 2018 with Shannon VERDIN, CNS, CNP:  Met with patient and brother Dylan at bedside.  Patient is somewhat anxious and overwhelmed but is able to speak directly about her current illness and plans to enroll in hospice as life is short.  She tells me she is most concerned about sleeping her life away and that she has found herself sleeping more and more during the day at home.  She wants to avoid sedating medications, we discussed medications for dyspnea and how they may be used in acute decline or chronically and non-medications for dyspnea, when she may want to choose a medication despite possible drowsiness and the natural process of decline from respiratory failure that eventually includes somnolence, even without medications.   Patient is most concerned about her son Rubén and how he will manage he illness, and decline " and eventual passing.  Dylan is reassuring he will continue to be a support to Rubén.  We discussed Hospice as a resource to family even after passing.    We reviewed Pleurx catheter and use with pamphlet she had already been given plus a picture from Pleurx website.      Other important issues for patient are that over the counter medications do not work for her and she is hesitant to add more medications to her regime, although acknowledges she will want to review current medications for those she may not need for comfort.    11/1/2018  Pt with increased FIO2 requirements, nausea/vomiting, increased pain associated with pleurx catheter over night.  On oxymizer at 15 liters/min. Drowsy but interactive. Introduced myself and reviewed plans for discharge. Within a few minute, pt asked again who I was. Reviewed plans again, especially medications for symptom management. Per nursing, symptoms controlled with oral MS, lorazepam. Pt verbalizing to Dr. Roy that she wishes to go home.     Reviewed pt's condition with pt's sister and sister's  in hallway with Dr. Roy,  hospice liaison Kasandra Levy and also with pt's  over the phone. They asked regarding prognosis. Shared with them it is hard to know for sure, but her clinical trajectory based on her respiratory status has changed significantly in the past weeks and days. Reviewed pharmacologic and non-pharmacologic management of shortness of breath, pain, dysphagia, possible seizures, incontinence.  After discussions, all are on-board with pt' discharge home today with  Hospice. Appreciate coordination with SWS Corinne White and Kasandra Levy,  Hospice.    Reviewed POLST over the phone with pt's /[facility] as pt's cognitive status is variable. POLST sent with pt.  may sign when pt arrives home if desired but POLST is in effect.  Comfort meds ordered.    Review of Systems    CONSTITUTIONAL: NEGATIVE for fever, chills  ENT/MOUTH: NEGATIVE for  ear, mouth and throat problems  RESP: POSITIVE for Significant cough or SOB worsening overnight.  CV: NEGATIVE for  palpitations or peripheral edema. POSITIVE for chest wall pain.      Palliative Symptom Review (0=no symptom/no concern, 1=mild, 2=moderate, 3=severe):      Pain: 2-moderate, but controlled this am with adjustments to pain plan.      Fatigue: 2-moderate      Nausea: 1- mild      Constipation: 0-none      Diarrhea: 0-none      Depressive Symptoms: 1-mild      Anxiety: 3-severe      Drowsiness: 0-none      Poor Appetite: Not assessed.      Shortness of Breath: 3-severe      Insomnia: 2- moderate.    Physical Exam   Temp:  [95.6  F (35.3  C)-96  F (35.6  C)] 96  F (35.6  C)  Pulse:  [] 103  Heart Rate:  [] 94  Resp:  [28-40] 28  BP: ()/(54-76) 91/58  SpO2:  [86 %-95 %] 90 %  88 lbs 1.6 oz  GEN:  Drowsy, oriented x 3, forgetful, intermittent restlessness.  HEENT:  Normocephalic/atraumatic, no scleral icterus, no nasal discharge, mouth moist.  CV:   Tachy. No edema BLE.  RESP:  Labored respiratory effort on oxymask. Diminished R. Crackles L base.  ABD:  Rounded, soft, non-tender/non-distended.   EXT:  Edema & pulses as noted above.  CMS intact x 4.     SKIN:  Ashen. Dry to touch, no exanthems noted in the visualized areas.    NEURO: VEGA, no focal deficits noted.  Psych: Intermittent Anxious affect but also engaged in brief symptom management discussion. Cooperative. Forgetful. Sleeps at times after receiving lorazepam and morphine.    Medications     - MEDICATION INSTRUCTIONS -         ipratropium - albuterol 0.5 mg/2.5 mg/3 mL  3 mL Nebulization 4x daily     Lacosamide  100 mg Oral QAM     lacosamide  200 mg Oral QPM     lamoTRIgine  300 mg Oral Daily     levETIRAcetam  1,000 mg Oral BID     lidocaine   Transdermal Q24H    And     lidocaine   Transdermal Q8H     methimazole (TAPAZOLE) tablet 5 mg  5 mg Oral QPM     morphine  15 mg Oral Q12H     ondansetron  4 mg Oral Q6H    Or      ondansetron  4 mg Intravenous Q6H     sodium chloride (PF)  3 mL Intracatheter Q8H       Data   Results for orders placed or performed during the hospital encounter of 10/29/18 (from the past 24 hour(s))   US Chest Tube Insert    Narrative    This exam was marked as non-reportable because it will not be read by a   radiologist or a [facility] non-radiologist provider.             XR Chest Tube Placement Right    Narrative    XR CHEST TUBE PLACEMENT NON-TUNNELED RIGHT  10/31/2018 1:52 PM     HISTORY:  58-year-old female with recurrent large right pleural  effusion requiring frequent thoracenteses twice a week now presenting  with significant shortness of breath on BiPAP.    COMPARISON: None.    FINDINGS: After obtaining informed consent, the patient was placed in  a supine position on the fluoroscopy table. The inferolateral thorax  was prepped and draped in the usual sterile manner. 1% lidocaine was  injected for local anesthesia. Under ultrasound guidance, access into  the pleural space was obtained using a 5 Congolese needle catheter  system. A wire was curled in the pleural space. Over the wire, a 16  Congolese peel-away sheath was placed. Approximately 10 cm inferior to  the pleural entry site, a small skin incision was made. A 16 Congolese  Pleurx catheter was pulled through this incision to the pleural entry  site.. The catheter was then passed through the peel-away sheath into  the pleural space. A fluoroscopic image was saved to document catheter  position. A thoracentesis was performed. Approximately 600 cc of fluid  was removed. The catheter was sutured to the tunnel entry site using 0  silk in a pursestring manner. The pleural entry site was closed with  deep interrupted stitch using 3-0 Vicryl. This was supplemented with  Dermabond. 850 cc of fluid was drained out.    The patient tolerated the procedure well. There were no immediate  postprocedure complications. The patient's vital signs were monitored  by  radiology nursing staff under my supervision and remained stable  throughout the study.    FLUOROSCOPY TIME: 23 seconds, radiation dose: 2 mg      Impression    IMPRESSION: Ultrasound and fluoroscopic guided tunneled pleural Pleurx  catheter placed as described above.    HILLARY SHETTY MD   Creatinine   Result Value Ref Range    Creatinine 1.37 (H) 0.52 - 1.04 mg/dL    GFR Estimate 39 (L) >60 mL/min/1.7m2    GFR Estimate If Black 48 (L) >60 mL/min/1.7m2   Platelet count   Result Value Ref Range    Platelet Count 496 (H) 150 - 450 10e9/L

## 2019-12-31 NOTE — PROCEDURES
RADIOLOGY POST PROCEDURE NOTE    Patient name: Meggan Law  MRN: [id number]  : 1960    Pre-procedure diagnosis: Right pleural effusion  Post-procedure diagnosis: Same    Procedure Date/Time: 2018  10:20 AM  Procedure: US guided right thoracentesis.  Fluid is septated.  Estimated blood loss: < 5 ml  Specimen(s) collected with description:  Right Pleural fluid    The patient tolerated the procedure well with no immediate complications.  Significant findings:  Please see above.    See imaging dictation for procedural details.    Provider name: Robb Tuttle  Assistant(s):None   same name as above

## 2022-02-18 NOTE — DISCHARGE INSTRUCTIONS
Oxygen Provider:  Arranged through [facility], contact number [phone].    
No

## 2024-07-09 NOTE — DISCHARGE INSTRUCTIONS
Thoracentesis Discharge Instructions     After you go home:      You may resume your normal diet    Care of Puncture Site:      For the first 48 hrs, check your puncture site every couple hours while you are awake     If there is a bandaid - you may remove it tomorrow morning    You may shower tomorrow    No tub baths, whirlpools or swimming until your puncture site has fully healed     Activity:      You may go back to normal activity in 24 hours    Wait 48 hours before lifting, straining, exercise or other strenuous activity    Medicines:      You may resume all your medications    For minor pain, you may take Acetaminophen (Tylenol) or Ibuprofen (Advil)            Call the provider who ordered this procedure if:      The site is red, swollen, hot or tender    Blood or fluid is draining from the site    You have chills or a fever greater than 101 F (38 C)    Shortness of breath    Pain that is getting worse    Any questions or concerns      Additional Information:      During this test, a needle will sometimes enter the lung. This can cause the lung to collapse - called a pneumothorax. Symptoms include severe chest pain, breathing problems or increased blood in your sputum (phlegm).     Call  911 or go to the Emergency Room if:      Severe chest pain or trouble breathing    Increased blood in your sputum (phlegm)    Bleeding that you cannot control      If you have questions call:        [facility] Radiology Dept @ [phone]             Statement Selected